# Patient Record
Sex: MALE | Race: WHITE | NOT HISPANIC OR LATINO | Employment: OTHER | URBAN - METROPOLITAN AREA
[De-identification: names, ages, dates, MRNs, and addresses within clinical notes are randomized per-mention and may not be internally consistent; named-entity substitution may affect disease eponyms.]

---

## 2017-06-27 ENCOUNTER — HOSPITAL ENCOUNTER (OUTPATIENT)
Dept: RADIOLOGY | Facility: HOSPITAL | Age: 67
Discharge: HOME | End: 2017-06-27
Attending: ORTHOPAEDIC SURGERY
Payer: MEDICARE

## 2017-06-27 ENCOUNTER — HOSPITAL ENCOUNTER (OUTPATIENT)
Dept: PREADMISSION TESTING | Facility: HOSPITAL | Age: 67
Discharge: HOME | End: 2017-06-27
Attending: ORTHOPAEDIC SURGERY
Payer: MEDICARE

## 2017-06-27 VITALS — HEIGHT: 73 IN | BODY MASS INDEX: 33.13 KG/M2 | WEIGHT: 250 LBS

## 2017-06-27 DIAGNOSIS — M19.90 OSTEOARTHRITIS, UNSPECIFIED OSTEOARTHRITIS TYPE, UNSPECIFIED SITE: Primary | ICD-10-CM

## 2017-06-27 LAB
ALBUMIN SERPL BCP-MCNC: 3.7 G/DL
ALP SERPL-CCNC: 98 U/L
ALT SERPL W/O P-5'-P-CCNC: 20 U/L
ANION GAP SERPL CALC-SCNC: 10 MMOL/L
AST SERPL-CCNC: 23 U/L
BASOPHILS # BLD AUTO: 0 K/UL
BASOPHILS NFR BLD: 0.4 %
BILIRUB SERPL-MCNC: 0.6 MG/DL
BILIRUB UR QL STRIP: NEGATIVE
BUN SERPL-MCNC: 17 MG/DL
CALCIUM SERPL-MCNC: 9.7 MG/DL
CHLORIDE SERPL-SCNC: 106 MMOL/L
CLARITY UR: CLEAR
CO2 SERPL-SCNC: 26 MMOL/L
COLOR UR: YELLOW
CREAT SERPL-MCNC: 1.1 MG/DL
DIFFERENTIAL METHOD: ABNORMAL
EOSINOPHIL # BLD AUTO: 0.1 K/UL
EOSINOPHIL NFR BLD: 2.3 %
ERYTHROCYTE [DISTWIDTH] IN BLOOD BY AUTOMATED COUNT: 13.8 %
EST. GFR  (AFRICAN AMERICAN): >60 ML/MIN/1.73 M^2
EST. GFR  (NON AFRICAN AMERICAN): >60 ML/MIN/1.73 M^2
GLUCOSE SERPL-MCNC: 110 MG/DL
GLUCOSE UR QL STRIP: NEGATIVE
HCT VFR BLD AUTO: 42.2 %
HGB BLD-MCNC: 14.5 G/DL
HGB UR QL STRIP: NEGATIVE
KETONES UR QL STRIP: NEGATIVE
LEUKOCYTE ESTERASE UR QL STRIP: NEGATIVE
LYMPHOCYTES # BLD AUTO: 1.9 K/UL
LYMPHOCYTES NFR BLD: 33.8 %
MCH RBC QN AUTO: 30.6 PG
MCHC RBC AUTO-ENTMCNC: 34.3 %
MCV RBC AUTO: 89 FL
MONOCYTES # BLD AUTO: 0.6 K/UL
MONOCYTES NFR BLD: 10.6 %
NEUTROPHILS # BLD AUTO: 3.1 K/UL
NEUTROPHILS NFR BLD: 52.9 %
NITRITE UR QL STRIP: NEGATIVE
PH UR STRIP: 6 [PH] (ref 5–8)
PLATELET # BLD AUTO: 157 K/UL
PMV BLD AUTO: 8.2 FL
POTASSIUM SERPL-SCNC: 4.2 MMOL/L
PROT SERPL-MCNC: 6.7 G/DL
PROT UR QL STRIP: NEGATIVE
RBC # BLD AUTO: 4.73 M/UL
SODIUM SERPL-SCNC: 142 MMOL/L
SP GR UR STRIP: 1.02 (ref 1–1.03)
URN SPEC COLLECT METH UR: NORMAL
UROBILINOGEN UR STRIP-ACNC: NEGATIVE EU/DL
WBC # BLD AUTO: 5.8 K/UL

## 2017-06-27 PROCEDURE — 80053 COMPREHEN METABOLIC PANEL: CPT

## 2017-06-27 PROCEDURE — 71020 XR CHEST PA AND LATERAL PRE-OP: CPT | Mod: TC

## 2017-06-27 PROCEDURE — 81003 URINALYSIS AUTO W/O SCOPE: CPT

## 2017-06-27 PROCEDURE — 93010 ELECTROCARDIOGRAM REPORT: CPT | Mod: ,,, | Performed by: INTERNAL MEDICINE

## 2017-06-27 PROCEDURE — 99900103 DSU ONLY-NO CHARGE-INITIAL HR (STAT)

## 2017-06-27 PROCEDURE — 71020 XR CHEST PA AND LATERAL PRE-OP: CPT | Mod: 26,,, | Performed by: RADIOLOGY

## 2017-06-27 PROCEDURE — 36415 COLL VENOUS BLD VENIPUNCTURE: CPT

## 2017-06-27 PROCEDURE — 99900104 DSU ONLY-NO CHARGE-EA ADD'L HR (STAT)

## 2017-06-27 PROCEDURE — 93005 ELECTROCARDIOGRAM TRACING: CPT

## 2017-06-27 PROCEDURE — 85025 COMPLETE CBC W/AUTO DIFF WBC: CPT

## 2017-06-27 PROCEDURE — 87081 CULTURE SCREEN ONLY: CPT

## 2017-06-27 RX ORDER — DOXYCYCLINE 100 MG/1
100 CAPSULE ORAL EVERY 12 HOURS
COMMUNITY
End: 2018-11-26

## 2017-06-27 RX ORDER — CLOBETASOL PROPIONATE 0.05 G/100ML
SHAMPOO TOPICAL
COMMUNITY
End: 2018-11-26

## 2017-06-28 NOTE — PRE-PROCEDURE INSTRUCTIONS
Pt states forgot  To tell  that his circulation below knees is not good, hair does not grow well and he wears support socks. Some red areas noted on lower legs. Milagros at Dr Tamayo's office notified.

## 2017-06-29 LAB — MRSA SPEC QL CULT: NORMAL

## 2017-07-07 ENCOUNTER — ANESTHESIA EVENT (OUTPATIENT)
Dept: SURGERY | Facility: HOSPITAL | Age: 67
DRG: 470 | End: 2017-07-07
Payer: MEDICARE

## 2017-07-10 ENCOUNTER — ANESTHESIA (OUTPATIENT)
Dept: SURGERY | Facility: HOSPITAL | Age: 67
DRG: 470 | End: 2017-07-10
Payer: MEDICARE

## 2017-07-10 ENCOUNTER — HOSPITAL ENCOUNTER (INPATIENT)
Facility: HOSPITAL | Age: 67
LOS: 2 days | Discharge: HOME HEALTH | DRG: 470 | End: 2017-07-12
Attending: ORTHOPAEDIC SURGERY | Admitting: ORTHOPAEDIC SURGERY
Payer: MEDICARE

## 2017-07-10 DIAGNOSIS — M19.90 OSTEOARTHRITIS, UNSPECIFIED OSTEOARTHRITIS TYPE, UNSPECIFIED SITE: Primary | ICD-10-CM

## 2017-07-10 DIAGNOSIS — Z96.652 STATUS POST TOTAL LEFT KNEE REPLACEMENT: ICD-10-CM

## 2017-07-10 DIAGNOSIS — G47.30 SLEEP APNEA, UNSPECIFIED TYPE: ICD-10-CM

## 2017-07-10 PROBLEM — E07.9 THYROID DISEASE: Status: ACTIVE | Noted: 2017-07-10

## 2017-07-10 PROCEDURE — 12000002 HC ACUTE/MED SURGE SEMI-PRIVATE ROOM

## 2017-07-10 PROCEDURE — D9220A PRA ANESTHESIA: Mod: ANES,,, | Performed by: ANESTHESIOLOGY

## 2017-07-10 PROCEDURE — 63600175 PHARM REV CODE 636 W HCPCS: Performed by: NURSE ANESTHETIST, CERTIFIED REGISTERED

## 2017-07-10 PROCEDURE — 94799 UNLISTED PULMONARY SVC/PX: CPT

## 2017-07-10 PROCEDURE — 63600175 PHARM REV CODE 636 W HCPCS: Performed by: ORTHOPAEDIC SURGERY

## 2017-07-10 PROCEDURE — 99900103 DSU ONLY-NO CHARGE-INITIAL HR (STAT): Performed by: ORTHOPAEDIC SURGERY

## 2017-07-10 PROCEDURE — 64448 NJX AA&/STRD FEM NRV NFS IMG: CPT | Mod: 59,LT,, | Performed by: ANESTHESIOLOGY

## 2017-07-10 PROCEDURE — 25000003 PHARM REV CODE 250: Performed by: ANESTHESIOLOGY

## 2017-07-10 PROCEDURE — 97530 THERAPEUTIC ACTIVITIES: CPT

## 2017-07-10 PROCEDURE — C1713 ANCHOR/SCREW BN/BN,TIS/BN: HCPCS | Performed by: ORTHOPAEDIC SURGERY

## 2017-07-10 PROCEDURE — 97162 PT EVAL MOD COMPLEX 30 MIN: CPT

## 2017-07-10 PROCEDURE — D9220A PRA ANESTHESIA: Mod: CRNA,,, | Performed by: NURSE ANESTHETIST, CERTIFIED REGISTERED

## 2017-07-10 PROCEDURE — C2626 INFUSION PUMP, NON-PROG,TEMP: HCPCS | Performed by: ANESTHESIOLOGY

## 2017-07-10 PROCEDURE — 76942 ECHO GUIDE FOR BIOPSY: CPT | Performed by: ANESTHESIOLOGY

## 2017-07-10 PROCEDURE — 25000003 PHARM REV CODE 250: Performed by: ORTHOPAEDIC SURGERY

## 2017-07-10 PROCEDURE — 25000003 PHARM REV CODE 250: Performed by: NURSE ANESTHETIST, CERTIFIED REGISTERED

## 2017-07-10 PROCEDURE — 37000009 HC ANESTHESIA EA ADD 15 MINS: Performed by: ORTHOPAEDIC SURGERY

## 2017-07-10 PROCEDURE — 71000033 HC RECOVERY, INTIAL HOUR: Performed by: ORTHOPAEDIC SURGERY

## 2017-07-10 PROCEDURE — 71000039 HC RECOVERY, EACH ADD'L HOUR: Performed by: ORTHOPAEDIC SURGERY

## 2017-07-10 PROCEDURE — 36000710: Performed by: ORTHOPAEDIC SURGERY

## 2017-07-10 PROCEDURE — 99900104 DSU ONLY-NO CHARGE-EA ADD'L HR (STAT): Performed by: ORTHOPAEDIC SURGERY

## 2017-07-10 PROCEDURE — 36000711: Performed by: ORTHOPAEDIC SURGERY

## 2017-07-10 PROCEDURE — 37000008 HC ANESTHESIA 1ST 15 MINUTES: Performed by: ORTHOPAEDIC SURGERY

## 2017-07-10 PROCEDURE — 97110 THERAPEUTIC EXERCISES: CPT

## 2017-07-10 PROCEDURE — 99222 1ST HOSP IP/OBS MODERATE 55: CPT | Mod: ,,, | Performed by: INTERNAL MEDICINE

## 2017-07-10 PROCEDURE — 0SRD0J9 REPLACEMENT OF LEFT KNEE JOINT WITH SYNTHETIC SUBSTITUTE, CEMENTED, OPEN APPROACH: ICD-10-PCS | Performed by: ORTHOPAEDIC SURGERY

## 2017-07-10 PROCEDURE — 76942 ECHO GUIDE FOR BIOPSY: CPT | Mod: 26,,, | Performed by: ANESTHESIOLOGY

## 2017-07-10 PROCEDURE — 63600175 PHARM REV CODE 636 W HCPCS

## 2017-07-10 PROCEDURE — C1776 JOINT DEVICE (IMPLANTABLE): HCPCS | Performed by: ORTHOPAEDIC SURGERY

## 2017-07-10 PROCEDURE — 27201423 OPTIME MED/SURG SUP & DEVICES STERILE SUPPLY: Performed by: ORTHOPAEDIC SURGERY

## 2017-07-10 PROCEDURE — 63600175 PHARM REV CODE 636 W HCPCS: Performed by: ANESTHESIOLOGY

## 2017-07-10 PROCEDURE — C1729 CATH, DRAINAGE: HCPCS | Performed by: ORTHOPAEDIC SURGERY

## 2017-07-10 PROCEDURE — 27200664 HC NERVE BLOCK COMPLETE KIT: Performed by: ANESTHESIOLOGY

## 2017-07-10 DEVICE — IMPLANTABLE DEVICE: Type: IMPLANTABLE DEVICE | Site: KNEE | Status: FUNCTIONAL

## 2017-07-10 RX ORDER — PROPOFOL 10 MG/ML
VIAL (ML) INTRAVENOUS
Status: DISCONTINUED | OUTPATIENT
Start: 2017-07-10 | End: 2017-07-10

## 2017-07-10 RX ORDER — PREGABALIN 75 MG/1
75 CAPSULE ORAL ONCE
Status: COMPLETED | OUTPATIENT
Start: 2017-07-10 | End: 2017-07-10

## 2017-07-10 RX ORDER — CELECOXIB 100 MG/1
200 CAPSULE ORAL ONCE
Status: COMPLETED | OUTPATIENT
Start: 2017-07-10 | End: 2017-07-10

## 2017-07-10 RX ORDER — KETOROLAC TROMETHAMINE 30 MG/ML
INJECTION, SOLUTION INTRAMUSCULAR; INTRAVENOUS
Status: DISCONTINUED | OUTPATIENT
Start: 2017-07-10 | End: 2017-07-10 | Stop reason: HOSPADM

## 2017-07-10 RX ORDER — KETAMINE HYDROCHLORIDE 100 MG/ML
INJECTION, SOLUTION INTRAMUSCULAR; INTRAVENOUS
Status: DISCONTINUED | OUTPATIENT
Start: 2017-07-10 | End: 2017-07-10

## 2017-07-10 RX ORDER — EPINEPHRINE 0.1 MG/ML
INJECTION INTRAVENOUS
Status: DISCONTINUED | OUTPATIENT
Start: 2017-07-10 | End: 2017-07-10 | Stop reason: HOSPADM

## 2017-07-10 RX ORDER — MORPHINE SULFATE 10 MG/ML
INJECTION INTRAMUSCULAR; INTRAVENOUS; SUBCUTANEOUS
Status: DISCONTINUED | OUTPATIENT
Start: 2017-07-10 | End: 2017-07-10 | Stop reason: HOSPADM

## 2017-07-10 RX ORDER — FENTANYL CITRATE 50 UG/ML
INJECTION, SOLUTION INTRAMUSCULAR; INTRAVENOUS
Status: DISCONTINUED | OUTPATIENT
Start: 2017-07-10 | End: 2017-07-10

## 2017-07-10 RX ORDER — BISACODYL 10 MG
10 SUPPOSITORY, RECTAL RECTAL DAILY
Status: DISCONTINUED | OUTPATIENT
Start: 2017-07-10 | End: 2017-07-12 | Stop reason: HOSPADM

## 2017-07-10 RX ORDER — CEFAZOLIN SODIUM 1 G/50ML
1 SOLUTION INTRAVENOUS
Status: DISCONTINUED | OUTPATIENT
Start: 2017-07-10 | End: 2017-07-10

## 2017-07-10 RX ORDER — ZOLPIDEM TARTRATE 5 MG/1
5 TABLET ORAL NIGHTLY PRN
Status: DISCONTINUED | OUTPATIENT
Start: 2017-07-10 | End: 2017-07-12 | Stop reason: HOSPADM

## 2017-07-10 RX ORDER — MORPHINE SULFATE 2 MG/ML
2 INJECTION, SOLUTION INTRAMUSCULAR; INTRAVENOUS EVERY 4 HOURS PRN
Status: DISCONTINUED | OUTPATIENT
Start: 2017-07-10 | End: 2017-07-12 | Stop reason: HOSPADM

## 2017-07-10 RX ORDER — GLYCOPYRROLATE 0.2 MG/ML
INJECTION INTRAMUSCULAR; INTRAVENOUS
Status: DISCONTINUED | OUTPATIENT
Start: 2017-07-10 | End: 2017-07-10

## 2017-07-10 RX ORDER — FAMOTIDINE 20 MG/1
20 TABLET, FILM COATED ORAL 2 TIMES DAILY
Status: DISCONTINUED | OUTPATIENT
Start: 2017-07-10 | End: 2017-07-12 | Stop reason: HOSPADM

## 2017-07-10 RX ORDER — POLYETHYLENE GLYCOL 3350 17 G/17G
17 POWDER, FOR SOLUTION ORAL DAILY
Status: DISCONTINUED | OUTPATIENT
Start: 2017-07-10 | End: 2017-07-12 | Stop reason: HOSPADM

## 2017-07-10 RX ORDER — OXYCODONE HCL 10 MG/1
10 TABLET, FILM COATED, EXTENDED RELEASE ORAL ONCE
Status: COMPLETED | OUTPATIENT
Start: 2017-07-10 | End: 2017-07-10

## 2017-07-10 RX ORDER — ACETAMINOPHEN 10 MG/ML
1000 INJECTION, SOLUTION INTRAVENOUS ONCE
Status: COMPLETED | OUTPATIENT
Start: 2017-07-10 | End: 2017-07-10

## 2017-07-10 RX ORDER — LIDOCAINE HYDROCHLORIDE 10 MG/ML
1 INJECTION, SOLUTION EPIDURAL; INFILTRATION; INTRACAUDAL; PERINEURAL ONCE
Status: COMPLETED | OUTPATIENT
Start: 2017-07-10 | End: 2017-07-10

## 2017-07-10 RX ORDER — ONDANSETRON 4 MG/1
8 TABLET, ORALLY DISINTEGRATING ORAL EVERY 8 HOURS PRN
Status: DISCONTINUED | OUTPATIENT
Start: 2017-07-10 | End: 2017-07-12 | Stop reason: HOSPADM

## 2017-07-10 RX ORDER — MIDAZOLAM HYDROCHLORIDE 1 MG/ML
INJECTION INTRAMUSCULAR; INTRAVENOUS
Status: DISCONTINUED | OUTPATIENT
Start: 2017-07-10 | End: 2017-07-10

## 2017-07-10 RX ORDER — ASPIRIN 325 MG
325 TABLET ORAL 2 TIMES DAILY
Status: DISCONTINUED | OUTPATIENT
Start: 2017-07-10 | End: 2017-07-12 | Stop reason: HOSPADM

## 2017-07-10 RX ORDER — ROPIVACAINE HYDROCHLORIDE 5 MG/ML
INJECTION, SOLUTION EPIDURAL; INFILTRATION; PERINEURAL
Status: DISCONTINUED | OUTPATIENT
Start: 2017-07-10 | End: 2017-07-10 | Stop reason: HOSPADM

## 2017-07-10 RX ORDER — CEFAZOLIN SODIUM 2 G/50ML
2 SOLUTION INTRAVENOUS ONCE
Status: COMPLETED | OUTPATIENT
Start: 2017-07-10 | End: 2017-07-10

## 2017-07-10 RX ORDER — CEFAZOLIN SODIUM 1 G/50ML
1 SOLUTION INTRAVENOUS
Status: COMPLETED | OUTPATIENT
Start: 2017-07-10 | End: 2017-07-11

## 2017-07-10 RX ORDER — DEXTROSE MONOHYDRATE AND SODIUM CHLORIDE 5; .45 G/100ML; G/100ML
INJECTION, SOLUTION INTRAVENOUS CONTINUOUS
Status: DISCONTINUED | OUTPATIENT
Start: 2017-07-10 | End: 2017-07-12 | Stop reason: HOSPADM

## 2017-07-10 RX ORDER — PROPOFOL 10 MG/ML
VIAL (ML) INTRAVENOUS CONTINUOUS PRN
Status: DISCONTINUED | OUTPATIENT
Start: 2017-07-10 | End: 2017-07-10

## 2017-07-10 RX ADMIN — CEFAZOLIN SODIUM 2 G: 2 SOLUTION INTRAVENOUS at 07:07

## 2017-07-10 RX ADMIN — KETAMINE HYDROCHLORIDE 25 MG: 100 INJECTION, SOLUTION, CONCENTRATE INTRAMUSCULAR; INTRAVENOUS at 07:07

## 2017-07-10 RX ADMIN — ROPIVACAINE HYDROCHLORIDE: 2 INJECTION, SOLUTION EPIDURAL; INFILTRATION at 09:07

## 2017-07-10 RX ADMIN — LIDOCAINE HYDROCHLORIDE 10 MG: 10 INJECTION, SOLUTION EPIDURAL; INFILTRATION; INTRACAUDAL; PERINEURAL at 06:07

## 2017-07-10 RX ADMIN — FENTANYL CITRATE 25 MCG: 50 INJECTION, SOLUTION INTRAMUSCULAR; INTRAVENOUS at 08:07

## 2017-07-10 RX ADMIN — METHOCARBAMOL 1000 MG: 100 INJECTION INTRAMUSCULAR; INTRAVENOUS at 06:07

## 2017-07-10 RX ADMIN — ASPIRIN 325 MG ORAL TABLET 325 MG: 325 PILL ORAL at 10:07

## 2017-07-10 RX ADMIN — SODIUM CHLORIDE 1000 ML: 0.9 INJECTION, SOLUTION INTRAVENOUS at 06:07

## 2017-07-10 RX ADMIN — FENTANYL CITRATE 50 MCG: 50 INJECTION, SOLUTION INTRAMUSCULAR; INTRAVENOUS at 06:07

## 2017-07-10 RX ADMIN — OXYCODONE HYDROCHLORIDE 10 MG: 10 TABLET, FILM COATED, EXTENDED RELEASE ORAL at 06:07

## 2017-07-10 RX ADMIN — PROPOFOL 10 MG: 10 INJECTION, EMULSION INTRAVENOUS at 07:07

## 2017-07-10 RX ADMIN — FENTANYL CITRATE 25 MCG: 50 INJECTION, SOLUTION INTRAMUSCULAR; INTRAVENOUS at 07:07

## 2017-07-10 RX ADMIN — SODIUM CHLORIDE, SODIUM GLUCONATE, SODIUM ACETATE, POTASSIUM CHLORIDE, MAGNESIUM CHLORIDE, SODIUM PHOSPHATE, DIBASIC, AND POTASSIUM PHOSPHATE: .53; .5; .37; .037; .03; .012; .00082 INJECTION, SOLUTION INTRAVENOUS at 08:07

## 2017-07-10 RX ADMIN — CEFAZOLIN SODIUM 1 G: 1 SOLUTION INTRAVENOUS at 03:07

## 2017-07-10 RX ADMIN — ACETAMINOPHEN 1000 MG: 10 INJECTION, SOLUTION INTRAVENOUS at 06:07

## 2017-07-10 RX ADMIN — GLYCOPYRROLATE 0.2 MG: 0.2 INJECTION, SOLUTION INTRAMUSCULAR; INTRAVENOUS at 07:07

## 2017-07-10 RX ADMIN — CELECOXIB 200 MG: 100 CAPSULE ORAL at 06:07

## 2017-07-10 RX ADMIN — PROPOFOL 100 MCG/KG/MIN: 10 INJECTION, EMULSION INTRAVENOUS at 07:07

## 2017-07-10 RX ADMIN — SODIUM CHLORIDE, SODIUM GLUCONATE, SODIUM ACETATE, POTASSIUM CHLORIDE, MAGNESIUM CHLORIDE, SODIUM PHOSPHATE, DIBASIC, AND POTASSIUM PHOSPHATE: .53; .5; .37; .037; .03; .012; .00082 INJECTION, SOLUTION INTRAVENOUS at 07:07

## 2017-07-10 RX ADMIN — FAMOTIDINE 20 MG: 20 TABLET, FILM COATED ORAL at 03:07

## 2017-07-10 RX ADMIN — CEFAZOLIN SODIUM 1 G: 1 SOLUTION INTRAVENOUS at 10:07

## 2017-07-10 RX ADMIN — PREGABALIN 75 MG: 75 CAPSULE ORAL at 06:07

## 2017-07-10 RX ADMIN — FAMOTIDINE 20 MG: 20 TABLET, FILM COATED ORAL at 10:07

## 2017-07-10 RX ADMIN — DEXTROSE AND SODIUM CHLORIDE: 5; .45 INJECTION, SOLUTION INTRAVENOUS at 10:07

## 2017-07-10 RX ADMIN — MIDAZOLAM HYDROCHLORIDE 2 MG: 1 INJECTION, SOLUTION INTRAMUSCULAR; INTRAVENOUS at 06:07

## 2017-07-10 NOTE — H&P
PCP: Wilfrido Lemon Iii, MD    History & Physical    Chief Complaint: s/p left total knee arthroplasty    History of Present Illness:  Patient is a 67 y.o. male admitted to Hospitalist Service from [facility] s/p left total knee arthroplasty performed by Dr. Tamayo. Patient reportedly has past medical history significant for OA, SIMON (on CPAP), hypothyroidism and varcosities of lower extremities. Post-operatively, patient denied chest pain, shortness of breath, abdominal pain, nausea, vomiting, headache, vision changes, focal neuro-deficits, cough or fever.    Past Medical History:   Diagnosis Date    Arthritis     Sleep apnea     USES CPAP    Thyroid disease     Varicose veins of both lower extremities     Wears glasses      Past Surgical History:   Procedure Laterality Date    BACK SURGERY      PAPAYA JUICE INJECTION    JOINT REPLACEMENT Right 10/01/2012    THYROID SURGERY      PARTIAL    UVULA      REMOVED FOR SLEEP APNEA     History reviewed. No pertinent family history.  Social History   Substance Use Topics    Smoking status: Never Smoker    Smokeless tobacco: Never Used    Alcohol use Yes      Comment: RARELY      Review of patient's allergies indicates:   Allergen Reactions    Adhesive Rash     MEDICAL GLUE CAUSED A RASH     PTA Medications   Medication Sig    clobetasol (CLOBEX) 0.05 % shampoo Apply topically twice a week.    doxycycline (VIBRAMYCIN) 100 MG Cap Take 100 mg by mouth every 12 (twelve) hours.     Review of Systems:  Constitutional: no fever or chills  Eyes: no visual changes  Ears, nose, mouth, throat, and face: no nasal congestion or sore throat  Respiratory: no cough or shorness of breath  Cardiovascular: no chest pain or palpitations  Gastrointestinal: no nausea or vomiting, no abdominal pain or change in bowel habits  Genitourinary: no hematuria or dysuria  Integument/breast: no rash or pruritis  Hematologic/lymphatic: no easy bruising or  lymphadenopathy  Musculoskeletal: see HPI  Neurological: no seizures or tremors.  Behavioral/Psych: no auditory or visual hallucinations  Endocrine: no heat or cold intolerance     OBJECTIVE:     Vital Signs (Most Recent)  Temp: 97.7 °F (36.5 °C) (07/10/17 1130)  Pulse: (!) 50 (07/10/17 1130)  Resp: 13 (07/10/17 1130)  BP: 133/85 (07/10/17 1130)  SpO2: 97 % (07/10/17 1130)    Physical Exam:  General appearance: well developed, appears stated age  Head: normocephalic, atraumatic  Eyes:  conjunctivae/corneas clear. PERRL.  Nose: Nares normal. Septum midline.  Throat: lips, mucosa, and tongue normal; teeth and gums normal, no throat erythema.  Neck: supple, symmetrical, trachea midline, no JVD and thyroid not enlarged, symmetric, no tenderness/mass/nodules  Lungs:  clear to auscultation bilaterally and normal respiratory effort  Chest wall: no tenderness  Heart: regular rate and rhythm, S1, S2 normal, no murmur, click, rub or gallop  Abdomen: soft, non-tender non-distented; bowel sounds normal; no masses,  no organomegaly  Extremities: no cyanosis, clubbing or edema. Left knee dressing C/D/I with a ELIGIO drain in place.  Pulses: 2+ and symmetric  Skin: Skin color, texture, turgor normal. No rashes or lesions.  Lymph nodes: Cervical, supraclavicular, and axillary nodes normal.  Neurologic: Normal strength and tone. No focal numbness or weakness. CNII-XII intact.      Laboratory:   CBC: No results for input(s): WBC, RBC, HGB, HCT, PLT, MCV, MCH, MCHC in the last 168 hours.  BMP: No results for input(s): GLU, NA, K, CL, CO2, BUN, CREATININE, CALCIUM, MG in the last 168 hours.    Diagnostic Results:  Left knee X-Ray: Status post left knee joint replacement.    Assessment/Plan:     Active Hospital Problems    Diagnosis  POA    *Status post total left knee replacement [Z96.652]  Continue to follow Orthopedic recommendations.  Needs aggressive incentive spirometry.  Follow hemoglobin and hematocrit closely.  Pain control with  IV narcotics and antiemetics as needed.  Physical therapy as per Orthopedics protocol with fall precautions.    Not Applicable    Osteoarthritis [M19.90]  PRN Analgesics.    Yes    Thyroid disease [E07.9]  Check TSH.    Yes    Sleep apnea [G47.30]  Yes     USES CPAP          VTE Risk Mitigation        On  mg po BID by Dr. Tamayo. Ordered     Place sequential compression device  Until discontinued      07/10/17 1140     Medium Risk of VTE  Once      07/10/17 1140        Cleo Adler MD  Department of Hospital Medicine   [facility]

## 2017-07-10 NOTE — ANESTHESIA PROCEDURE NOTES
Peripheral    Patient location during procedure: pre-op   Block not for primary anesthetic.  Reason for block: at surgeon's request and post-op pain management   Post-op Pain Location: knee left  Start time: 7/10/2017 6:42 AM  Timeout: 7/10/2017 6:42 AM   End time: 7/10/2017 6:53 AM  Surgery related to: TKA  Staffing  Anesthesiologist: ROMULO HILTON  Performed: anesthesiologist   Preanesthetic Checklist  Completed: patient identified, site marked, surgical consent, pre-op evaluation, timeout performed, IV checked, risks and benefits discussed and monitors and equipment checked  Peripheral Block  Patient position: supine  Prep: ChloraPrep and site prepped and draped  Patient monitoring: cardiac monitor, continuous pulse ox and frequent blood pressure checks  Block type: femoral  Laterality: left  Injection technique: continuous  Needle  Needle type: Tuohy   Needle gauge: 18 G  Needle length: 4 in  Needle localization: ultrasound guidance  Catheter type: non-stimulating  Catheter size: 20 G  Test dose: lidocaine 1.5% with Epi 1-to-200,000 and negative   -ultrasound image captured on disc.  Assessment  Injection assessment: negative aspiration, negative parasthesia and local visualized surrounding nerve  Paresthesia pain: none  Heart rate change: no  Slow fractionated injection: yes  Medications:  Bolus administered: 30 mL of 0.5 ropivacaine  Epinephrine added: none

## 2017-07-10 NOTE — ANESTHESIA PREPROCEDURE EVALUATION
07/10/2017  Aaron Ross is a 67 y.o., male.    Anesthesia Evaluation    I have reviewed the Patient Summary Reports.    I have reviewed the Nursing Notes.   I have reviewed the Medications.     Review of Systems  Anesthesia Hx:  Denies Family Hx of Anesthesia complications.   Denies Personal Hx of Anesthesia complications.   Social:  Non-Smoker    Pulmonary:   Sleep Apnea, CPAP    Renal/:  Renal/ Normal     Hepatic/GI:  Hepatic/GI Normal    Musculoskeletal:   Arthritis     Neurological:  Neurology Normal    Endocrine:  Endocrine Normal    Psych:  Psychiatric Normal           Physical Exam  General:  Obesity    Airway/Jaw/Neck:  Airway Findings: Mouth Opening: Normal Tongue: Normal  General Airway Assessment: Adult  Mallampati: III  Improves to II with phonation.  TM Distance: Normal, at least 6 cm  Jaw/Neck Findings:  Neck ROM: Extension Decreased, Mild  Neck Findings:  Girth Increased      Dental:  Dental Findings: upper front caps, molar caps   Chest/Lungs:  Chest/Lungs Clear    Heart/Vascular:  Heart Findings: Normal            Anesthesia Plan  Type of Anesthesia, risks & benefits discussed:  Anesthesia Type:  spinal  Patient's Preference:   Intra-op Monitoring Plan: standard ASA monitors  Intra-op Monitoring Plan Comments:   Post Op Pain Control Plan: multimodal analgesia and peripheral nerve block  Post Op Pain Control Plan Comments:   Induction:   IV  Beta Blocker:  Patient is not currently on a Beta-Blocker (No further documentation required).       Informed Consent: Patient understands risks and agrees with Anesthesia plan.  Questions answered. Anesthesia consent signed with patient.  ASA Score: 2     Day of Surgery Review of History & Physical:    H&P update referred to the surgeon.         Ready For Surgery From Anesthesia Perspective.

## 2017-07-10 NOTE — OP NOTE
[facility]  Orthopedic  Operative Note    SUMMARY     Date of Procedure: 7/10/2017     Procedure: Procedure(s) (LRB):  ARTHROPLASTY-KNEE (Left)       Surgeon(s) and Role:     * Vasu Tamayo MD - Primary    Assisting Surgeon: None    Pre-Operative Diagnosis: Osteoarthritis of left knee [M17.12]    Post-Operative Diagnosis: Post-Op Diagnosis Codes:     * Osteoarthritis of left knee [M17.12]    Anesthesia: General    Complications: Sign        Procedure in general:    The patient was placed on the operating table in supine position. He was prepped and draped in the usual sterile manner for surgery. An anterior approach was undertaken to the knee. It was carried down sharply through the skin. Medial parapatellar tissues were divided. The patella was taken laterally. The medial tibial plateau was taken down of his tissue subperiosteally. A drill was used to gain access to the femur. Intramedullary gerard was inserted up the femoral canal. The cutting jig was dependent position such that would make a 5° valgus cut and take 9 mm of distal bone. The femur was now sized. It sized to a size 6. Size 6 cutting jig was dependent position and all the femoral cuts were made. The femoral trial was placed. If it ideally.    At this point we turned our attention to the tibia. The tibia was subluxed anteriorly. Cutting jig was dependent position such that it would make a neutral cut and take 2 mm of medial bone. It was checked secondarily in the cut was made. We now placed a femoral trial and the tibial trial. We had full extension. Flexion symmetric flexion and extension gaps.    The patella was calibrated and cut and a button was placed. Construct all perfectly with no liftoff.    We pulsed and irrigated and dried the bony surfaces. We mixed her bone cement and cemented first the tibia next the femur and finally very patella. All excess cement was removed at the time of cementation in the construct was held in full  extension until the cement had completely hardened. All excess cement was removed at the time of cementation. We pulsed and irrigated. The actual space was tapped into position. A drain was brought out laterally. We closed the extensor mechanism with a combination of #2 FiberWire in a running stitch. We irrigated again. We closed the subcutaneous Vicryl and the skin with PDS. Sterile dressings were applied and the patient was taken to recovery room in stable condition pending an x-ray and the neurovascular check.    Estimated Blood Loss (EBL): * No values recorded between 7/10/2017  7:58 AM and 7/10/2017  8:52 AM *           Implants:   Implant Name Type Inv. Item Serial No.  Lot No. LRB No. Used   Radiopaque Bone Cement     [employer] [id number] Left 2   Evolution MP Keeled Tibial Base     [employer] [id number] Left 1   [employer] MP CS/CR Femoral Component     [employer] [id number] Left 1   Advance Onlay All-poly Patella        [employer] [id number] Left 1       Specimens:   Specimen (12h ago through future)    None                  Condition: Stable    Disposition: PACU - hemodynamically stable.    Attestation: I was present and scrubbed for the entire procedure.

## 2017-07-10 NOTE — TRANSFER OF CARE
Anesthesia Transfer of Care Note    Patient: Aaron Ross    Procedure(s) Performed: Procedure(s) (LRB):  ARTHROPLASTY-KNEE (Left)    Patient location: PACU    Anesthesia Type: spinal    Transport from OR: Transported from OR on 2-3 L/min O2 by NC with adequate spontaneous ventilation    Post pain: adequate analgesia    Post assessment: no apparent anesthetic complications    Post vital signs: stable    Level of consciousness: awake and alert    Nausea/Vomiting: no nausea/vomiting    Complications: none    Transfer of care protocol was followed      Last vitals:   Visit Vitals  BP (!) 159/73 (BP Location: Left arm, BP Method: Automatic)   Pulse 62   Temp 36.6 °C (97.9 °F) (Temporal)   Resp 16   SpO2 97%

## 2017-07-10 NOTE — PT/OT/SLP EVAL
Physical Therapy  Evaluation    Aaron Ross   MRN: [id number]   Admitting Diagnosis: Status post total left knee replacement    PT Received On: 07/10/17  PT Start Time: 1318     PT Stop Time: 1349    PT Total Time (min): 31 min       Billable Minutes:  Evaluation 10, Therapeutic Activity 10, Therapeutic Exercise 11    Diagnosis: Status post total left knee replacement (7/10/17)      Past Medical History:   Diagnosis Date    Arthritis     Sleep apnea     USES CPAP    Thyroid disease     Varicose veins of both lower extremities     Wears glasses       Past Surgical History:   Procedure Laterality Date    BACK SURGERY      PAPAYA JUICE INJECTION    JOINT REPLACEMENT Right 10/01/2012    THYROID SURGERY      PARTIAL    UVULA      REMOVED FOR SLEEP APNEA       Referring physician: Finger  Date referred to PT: 7/10/17    General Precautions: Standard, fall  Orthopedic Precautions: LLE weight bearing as tolerated   Braces: N/A            Patient History:  Lives With: spouse  Living Arrangements: house  Equipment Currently Used at Home: cane, straight  DME owned (not currently used): none    Previous Level of Function:  Ambulation Skills: needs device  Transfer Skills: needs device  ADL Skills: needs device  Work/Leisure Activity: needs device    Subjective:  Communicated with nurse ([place]) prior to session.    Chief Complaint: lack of functional mobility  Patient goals: to go home    Pain/Comfort  Pain Rating 1: 0/10  Pain Rating Post-Intervention 1: 0/10      Objective:   Patient found with: PCA, oxygen, SCD, telemetry, peripheral IV (RUSSELL drain)     Cognitive Exam:  Oriented to: Person, Place, Time and Situation    Follows Commands/attention: Easily distracted and Follows one-step commands  Communication: clear/fluent  Safety awareness/insight to disability: intact    Physical Exam:  Postural examination/scapula alignment: Rounded shoulder and Head forward    Skin integrity: Visible skin intact  Edema: None  noted     Sensation:   Impaired  light/touch LLE thigh    Upper Extremity Range of Motion:  Right Upper Extremity: WFL  Left Upper Extremity: WFL    Upper Extremity Strength:  Right Upper Extremity: WFL  Left Upper Extremity: WFL    Lower Extremity Range of Motion:  Right Lower Extremity: WFL  Left Lower Extremity: knee flexion ROM  degrees    Lower Extremity Strength:  Right Lower Extremity: >4-/5  Left Lower Extremity: >3-/5    Functional Mobility:  Bed Mobility:  Supine to Sit: Minimum Assistance  Sit to Supine: Minimum Assistance    Transfers:  Sit <> Stand Assistance: Moderate Assistance  Sit <> Stand Assistive Device: Rolling Walker    Gait:   Gait Distance: 4 steps with safety belt in place  Assistance 1: Moderate assistance  Gait Assistive Device: Rolling walker  Gait Pattern: 3-point gait  Gait Deviation(s): decreased kaleb, increased time in double stance, decreased step length (L knee buckling)      Balance:   Static Sit: FAIR-: Maintains without assist but inconsistent   Dynamic Sit: FAIR+: Maintains balance through MINIMAL excursions of active trunk motion  Static Stand: POOR: Needs MODERATE assist to maintain  Dynamic stand: POOR: N/A    Therapeutic Activities and Exercises:  Pt gait trained with side steps to the R at EOB with safety belt in place, mod A, and RW.   Supine exercise: quad sets x 10, ankle pumps x 10, glute sets x 10    AM-PAC 6 CLICK MOBILITY  How much help from another person does this patient currently need?   1 = Unable, Total/Dependent Assistance  2 = A lot, Maximum/Moderate Assistance  3 = A little, Minimum/Contact Guard/Supervision  4 = None, Modified [place]/Independent          AM-PAC Raw Score CMS G-Code Modifier Level of Impairment Assistance   6 % Total / Unable   7 - 9 CM 80 - 100% Maximal Assist   10 - 14 CL 60 - 80% Moderate Assist   15 - 19 CK 40 - 60% Moderate Assist   20 - 22 CJ 20 - 40% Minimal Assist   23 CI 1-20% SBA / CGA   24 CH 0% Independent/  Mod I     Patient left HOB elevated with all lines intact, call button in reach and wife present.    Assessment:   Aaron Ross is a 67 y.o. male with a medical diagnosis of Status post total left knee replacement and presents with LLE weakness, gait instability, and decreased L knee ROM. Prior to admit pt was independent living at home using SPC. Pt lives with wife and she is able to help take care of him. Skilled therapy is medically necessary to address these impairments in order to return the pt back to his previous level of functioning.     Rehab identified problem list/impairments: Rehab identified problem list/impairments: weakness, impaired endurance, impaired sensation, gait instability, decreased lower extremity function, impaired functional mobilty, impaired balance    Rehab potential is good.    Activity tolerance: Good    Discharge recommendations: Discharge Facility/Level Of Care Needs: home health PT     Barriers to discharge: Barriers to Discharge: None    Equipment recommendations: Equipment Needed After Discharge: walker, rolling     GOALS:    Physical Therapy Goals        Problem: Physical Therapy Goal    Goal Priority Disciplines Outcome Goal Variances Interventions   Physical Therapy Goal    High PT/OT, PT      Description:  Goals to be met by: 17    Patient will increase functional independence with mobility by performin. Supine to sit with MInimal Assistance  2. Sit to supine with MInimal Assistance  3. Sit to stand transfer with Minimal Assistance  4. Gait  x >150 feet with Minimal Assistance using Rolling Walker.   5. Lower extremity exercise program x10 reps per handout, with supervision                      PLAN:    Patient to be seen BID to address the above listed problems via gait training, therapeutic activities, therapeutic exercises  Plan of Care expires: 17  Plan of Care reviewed with: spouse, patient          Alexsandra Monique, PT  07/10/2017

## 2017-07-10 NOTE — ANESTHESIA POSTPROCEDURE EVALUATION
"Anesthesia Post Evaluation    Patient: Aarno Ross    Procedure(s) Performed: Procedure(s) (LRB):  ARTHROPLASTY-KNEE (Left)    Final Anesthesia Type: spinal  Patient location during evaluation: PACU  Patient participation: Yes- Able to Participate  Level of consciousness: awake and alert  Post-procedure vital signs: reviewed and stable  Pain management: adequate  Airway patency: patent  PONV status at discharge: No PONV  Anesthetic complications: no      Cardiovascular status: blood pressure returned to baseline  Respiratory status: unassisted  Hydration status: euvolemic  Follow-up not needed.        Visit Vitals  /85   Pulse 62   Temp 36.4 °C (97.5 °F) (Oral)   Resp 18   Ht 6' 0.5" (1.842 m)   Wt 113.4 kg (250 lb)   SpO2 99%   BMI 33.44 kg/m²       Pain/Alexander Score: Pain Assessment Performed: Yes (7/10/2017  4:00 PM)  Presence of Pain: denies (7/10/2017  4:00 PM)  Pain Rating Prior to Med Admin: 0 (7/10/2017  2:00 PM)  Alexander Score: 10 (7/10/2017 11:30 AM)      "

## 2017-07-10 NOTE — ANESTHESIA PROCEDURE NOTES
Spinal    Diagnosis: DJD knee left  Patient location during procedure: OR  Start time: 7/10/2017 7:14 AM  Timeout: 7/10/2017 7:14 AM  End time: 7/10/2017 7:17 AM  Staffing  Anesthesiologist: ROMULO HILTON  Performed: anesthesiologist   Preanesthetic Checklist  Completed: patient identified, site marked, surgical consent, pre-op evaluation, timeout performed, IV checked, risks and benefits discussed and monitors and equipment checked  Spinal Block  Patient position: sitting  Prep: ChloraPrep  Patient monitoring: heart rate and cardiac monitor  Approach: right paramedian  Location: L3-4  Injection technique: single shot  CSF Fluid: clear free-flowing CSF  Needle  Needle type: Aditya   Needle gauge: 25 G  Needle length: 3.5 in  Additional Documentation: incremental injection, negative aspiration for heme and no paresthesia on injection  Needle localization: anatomical landmarks  Assessment  Sensory level: T9   Dermatomal levels determined by alcohol wipe  Ease of block: easy  Patient's tolerance of the procedure: comfortable throughout block and no complaints  Medications:  Bolus administered: 3 mL of 0.5 bupivacaine  Epinephrine added: none

## 2017-07-10 NOTE — BRIEF OP NOTE
[facility]  Brief Operative Note    SUMMARY     Surgery Date: 7/10/2017     Surgeon(s) and Role:     * Vasu Tamayo MD - Primary    Assisting Surgeon: None    Pre-op Diagnosis:  Osteoarthritis of left knee [M17.12]    Post-op Diagnosis:  Post-Op Diagnosis Codes:     * Osteoarthritis of left knee [M17.12]    Procedure(s) (LRB):  ARTHROPLASTY-KNEE (Left)    Anesthesia: General    Description of Procedure: L TKA    Description of the findings of the procedure: same    Estimated Blood Loss:0       Specimens:   Specimen (12h ago through future)    None

## 2017-07-10 NOTE — PLAN OF CARE
Problem: Patient Care Overview  Goal: Plan of Care Review  Pt remains free from injury.  S/p left TKA for Dr Tamayo.  RUSSELL in use and autotransfused 405cc.  Addison to gravity and to be removed POD 1.  Pain controled at this time with Qball.  Frequent checks completed.  Neuro checks WNL.  Up with PT briefly today.  POC discussed.  IS at bedside, with good effort.  Verbalized understanding.

## 2017-07-11 LAB
ANION GAP SERPL CALC-SCNC: 7 MMOL/L
BASOPHILS # BLD AUTO: 0 K/UL
BASOPHILS NFR BLD: 0.2 %
BUN SERPL-MCNC: 12 MG/DL
CALCIUM SERPL-MCNC: 8.8 MG/DL
CHLORIDE SERPL-SCNC: 105 MMOL/L
CO2 SERPL-SCNC: 26 MMOL/L
CREAT SERPL-MCNC: 1 MG/DL
DIFFERENTIAL METHOD: ABNORMAL
EOSINOPHIL # BLD AUTO: 0.1 K/UL
EOSINOPHIL NFR BLD: 1.4 %
ERYTHROCYTE [DISTWIDTH] IN BLOOD BY AUTOMATED COUNT: 14 %
EST. GFR  (AFRICAN AMERICAN): >60 ML/MIN/1.73 M^2
EST. GFR  (NON AFRICAN AMERICAN): >60 ML/MIN/1.73 M^2
GLUCOSE SERPL-MCNC: 112 MG/DL
HCT VFR BLD AUTO: 37.5 %
HGB BLD-MCNC: 13 G/DL
LYMPHOCYTES # BLD AUTO: 1.3 K/UL
LYMPHOCYTES NFR BLD: 13 %
MCH RBC QN AUTO: 31.1 PG
MCHC RBC AUTO-ENTMCNC: 34.6 %
MCV RBC AUTO: 90 FL
MONOCYTES # BLD AUTO: 1.1 K/UL
MONOCYTES NFR BLD: 11.8 %
NEUTROPHILS # BLD AUTO: 7.1 K/UL
NEUTROPHILS NFR BLD: 73.6 %
PLATELET # BLD AUTO: 133 K/UL
PMV BLD AUTO: 8.3 FL
POTASSIUM SERPL-SCNC: 4.1 MMOL/L
RBC # BLD AUTO: 4.17 M/UL
SODIUM SERPL-SCNC: 138 MMOL/L
WBC # BLD AUTO: 9.7 K/UL

## 2017-07-11 PROCEDURE — G8988 SELF CARE GOAL STATUS: HCPCS | Mod: CJ

## 2017-07-11 PROCEDURE — 25000003 PHARM REV CODE 250: Performed by: INTERNAL MEDICINE

## 2017-07-11 PROCEDURE — 94799 UNLISTED PULMONARY SVC/PX: CPT

## 2017-07-11 PROCEDURE — 85025 COMPLETE CBC W/AUTO DIFF WBC: CPT

## 2017-07-11 PROCEDURE — 36415 COLL VENOUS BLD VENIPUNCTURE: CPT

## 2017-07-11 PROCEDURE — 99900035 HC TECH TIME PER 15 MIN (STAT)

## 2017-07-11 PROCEDURE — 99232 SBSQ HOSP IP/OBS MODERATE 35: CPT | Mod: ,,, | Performed by: INTERNAL MEDICINE

## 2017-07-11 PROCEDURE — 97535 SELF CARE MNGMENT TRAINING: CPT

## 2017-07-11 PROCEDURE — 97530 THERAPEUTIC ACTIVITIES: CPT

## 2017-07-11 PROCEDURE — 97165 OT EVAL LOW COMPLEX 30 MIN: CPT

## 2017-07-11 PROCEDURE — 25000003 PHARM REV CODE 250: Performed by: ORTHOPAEDIC SURGERY

## 2017-07-11 PROCEDURE — 97116 GAIT TRAINING THERAPY: CPT

## 2017-07-11 PROCEDURE — 12000002 HC ACUTE/MED SURGE SEMI-PRIVATE ROOM

## 2017-07-11 PROCEDURE — 80048 BASIC METABOLIC PNL TOTAL CA: CPT

## 2017-07-11 PROCEDURE — G8987 SELF CARE CURRENT STATUS: HCPCS | Mod: CK

## 2017-07-11 PROCEDURE — 63600175 PHARM REV CODE 636 W HCPCS: Performed by: ORTHOPAEDIC SURGERY

## 2017-07-11 PROCEDURE — 94761 N-INVAS EAR/PLS OXIMETRY MLT: CPT

## 2017-07-11 RX ORDER — HYDROCODONE BITARTRATE AND ACETAMINOPHEN 10; 325 MG/1; MG/1
1 TABLET ORAL EVERY 6 HOURS PRN
Status: DISCONTINUED | OUTPATIENT
Start: 2017-07-11 | End: 2017-07-12 | Stop reason: HOSPADM

## 2017-07-11 RX ADMIN — CEFAZOLIN SODIUM 1 G: 1 SOLUTION INTRAVENOUS at 06:07

## 2017-07-11 RX ADMIN — MORPHINE SULFATE 2 MG: 2 INJECTION, SOLUTION INTRAMUSCULAR; INTRAVENOUS at 11:07

## 2017-07-11 RX ADMIN — DEXTROSE AND SODIUM CHLORIDE: 5; .45 INJECTION, SOLUTION INTRAVENOUS at 05:07

## 2017-07-11 RX ADMIN — FAMOTIDINE 20 MG: 20 TABLET, FILM COATED ORAL at 10:07

## 2017-07-11 RX ADMIN — HYDROCODONE BITARTRATE AND ACETAMINOPHEN 1 TABLET: 10; 325 TABLET ORAL at 09:07

## 2017-07-11 RX ADMIN — ONDANSETRON 8 MG: 4 TABLET, ORALLY DISINTEGRATING ORAL at 07:07

## 2017-07-11 RX ADMIN — HYDROCODONE BITARTRATE AND ACETAMINOPHEN 1 TABLET: 10; 325 TABLET ORAL at 02:07

## 2017-07-11 RX ADMIN — ASPIRIN 325 MG ORAL TABLET 325 MG: 325 PILL ORAL at 10:07

## 2017-07-11 RX ADMIN — FAMOTIDINE 20 MG: 20 TABLET, FILM COATED ORAL at 09:07

## 2017-07-11 RX ADMIN — ONDANSETRON 8 MG: 4 TABLET, ORALLY DISINTEGRATING ORAL at 02:07

## 2017-07-11 RX ADMIN — ASPIRIN 325 MG ORAL TABLET 325 MG: 325 PILL ORAL at 09:07

## 2017-07-11 NOTE — PLAN OF CARE
Problem: Physical Therapy Goal  Goal: Physical Therapy Goal  Goals to be met by: 17    Patient will increase functional independence with mobility by performin. Supine to sit with MInimal Assistance  2. Sit to supine with MInimal Assistance  3. Sit to stand transfer with Minimal Assistance  4. Gait  x >150 feet with Minimal Assistance using Rolling Walker.   5. Lower extremity exercise program x10 reps per handout, with supervision     Outcome: Ongoing (interventions implemented as appropriate)  PT for bed mobility, transfer training, and gait BID.

## 2017-07-11 NOTE — PLAN OF CARE
Problem: Patient Care Overview  Goal: Plan of Care Review  Pt vital signs stabel at this time. Rt hand IV infusing D5 1/2 NS @ 75 cc/hr and antibiotics as ordered without any difficulty. Pt s/p Lt TKA per Dr Tamayo on 7/10/17, surgical dsg, Q ball  and cryotherapy in place. Pt denies pain so far this shift. Bipap @ HS. RUSSELL drain in place to Lt knee. Addison intact draining yellow urine. Call light within pt reach, pt instructed to call staff for any needed assistance, Q2hr frequent checks performed, safety maintained. Pt denies needs/concerns at this time, will continue to monitor.

## 2017-07-11 NOTE — PT/OT/SLP PROGRESS
Physical Therapy  Treatment    Aaron Ross   MRN: [id number]   Admitting Diagnosis: Status post total left knee replacement    PT Received On: 07/11/17  PT Start Time: 1120     PT Stop Time: 1141    PT Total Time (min): 21 min       Billable Minutes:  Gait [id number] and Therapeutic Activity 10    Treatment Type: Treatment  PT/PTA: PTA     PTA Visit Number: 1       General Precautions: Standard, fall  Orthopedic Precautions: LLE weight bearing as tolerated   Braces: N/A         Subjective:  Communicated with nurse Merritt prior to session.  Pt agreeable to therapy.     Pain/Comfort  Pain Rating 1: 2/10    Objective:   Patient found with: perineural catheter, peripheral IV, Polar ice, SCD, telemetry ([facility])    Functional Mobility:  Bed Mobility:   Supine to Sit: Minimum Assistance A with L LE    Transfers:  Sit <> Stand Assistance: Minimum Assistance (pt impulsive, trying to stand on own)  Sit <> Stand Assistive Device: Rolling Walker   Bed to chair: Min A with RW    Gait:   Gait Distance: 150' with mod A 2' L LE buckling episodes  Assistance 1: Moderate assistance  Gait Assistive Device: Rolling walker  Gait Pattern: 3-point gait  Gait Deviation(s): decreased kaleb, increased time in double stance, decreased step length, decreased velocity of limb motion, decreased stride length, decreased toe-to-floor clearance, excessive knee flexion, decreased weight-shifting ability (L knee buckling)        Balance:   Static Sit: NORMAL: No deviations seen in posture held statically  Dynamic Sit: NORMAL: No deviations seen in posture held dynamically  Static Stand: FAIR: Maintains without assist but unable to take challenges  Dynamic stand: POOR: N/A     Therapeutic Activities and Exercises:  Pt edu on safety and waiting for assistance to stand or ambulate to prevent fall.          Patient left up in chair with all lines intact, call button in reach and nurse Merritt notified.    Assessment:  Aaron Ross is a 67  y.o. male with a medical diagnosis of Status post total left knee replacement and presents with overall improved activity tolerance and gait distance today. Pt with decreased safety awareness and L knee buckling with gait, at risk for fall without assistance present.     Rehab identified problem list/impairments: Rehab identified problem list/impairments: weakness, impaired endurance, impaired sensation, impaired functional mobilty, gait instability, impaired balance, decreased safety awareness, pain, decreased lower extremity function, decreased ROM, orthopedic precautions    Rehab potential is good.    Activity tolerance: Good      GOALS:    Physical Therapy Goals        Problem: Physical Therapy Goal    Goal Priority Disciplines Outcome Goal Variances Interventions   Physical Therapy Goal    High PT/OT, PT      Description:  Goals to be met by: 17    Patient will increase functional independence with mobility by performin. Supine to sit with MInimal Assistance  2. Sit to supine with MInimal Assistance  3. Sit to stand transfer with Minimal Assistance  4. Gait  x >150 feet with Minimal Assistance using Rolling Walker.   5. Lower extremity exercise program x10 reps per handout, with supervision                      PLAN:    Patient to be seen BID  to address the above listed problems via gait training, therapeutic activities, therapeutic exercises  Plan of Care expires: 17  Plan of Care reviewed with: patient         Arcelia Bettencourt, PTA  2017

## 2017-07-11 NOTE — PT/OT/SLP EVAL
Occupational Therapy  Evaluation    Aaron Ross   MRN: [id number]   Admitting Diagnosis: Status post total left knee replacement    OT Date of Treatment: 07/11/17   OT Start Time: 1420  OT Stop Time: 1440  OT Total Time (min): 20 min    Billable Minutes:  Evaluation 8  Self Care/Home Management 12    Diagnosis: Status post total left knee replacement       Past Medical History:   Diagnosis Date    Arthritis     Sleep apnea     USES CPAP    Thyroid disease     Varicose veins of both lower extremities     Wears glasses       Past Surgical History:   Procedure Laterality Date    BACK SURGERY      PAPAYA JUICE INJECTION    JOINT REPLACEMENT Right 10/01/2012    THYROID SURGERY      PARTIAL    UVULA      REMOVED FOR SLEEP APNEA       Referring physician: Finger  Date referred to OT: 7/10/17    General Precautions: Standard, fall  Orthopedic Precautions: LLE weight bearing as tolerated  Braces: N/A          Patient History:  Living Environment  Lives With: spouse  Living Arrangements: house  Home Layout: Able to live on 1st floor  Transportation Available: car, family or friend will provide  Living Environment Comment: Pt lives with wife in a 2 story house with 1 [address]. He does not have to go upstairs and has a walk-in shower. Wife is able to help as needed.  Equipment Currently Used at Home: none    Prior level of function:   Bed Mobility/Transfers: independent  Grooming: independent  Bathing: independent  Upper Body Dressing: independent  Lower Body Dressing: independent  Toileting: independent  Home Management Skills: independent  Driving License: Yes  Mode of Transportation: Car, Family  Occupation: Retired  IADL Comments: Pt was (I)/Mod I with ADL/IADLs with pain and at a slow pace.     Dominant hand: right    Subjective:  Communicated with nurse Shaina prior to session.  Stated patient was cleared for OT today.  Chief Complaint: L knee pain  Patient/Family stated goals: to walk without  pain    Pain/Comfort  Pain Rating 1: 8/10  Location - Side 1: Left  Location - Orientation 1: generalized  Location 1: knee  Pain Addressed 1: Pre-medicate for activity (Nurse gave pt pain medicine at start of OT evaluation.)  Pain Rating Post-Intervention 1: 8/10    Objective:  Patient found with: perineural catheter, peripheral IV, SCD, FCD, telemetry, Polar ice (RUSSELL)    Cognitive Exam:  Oriented to: Person, Place, Time and Situation  Follows Commands/attention: Follows multistep  commands  Communication: clear/fluent  Memory:  No Deficits noted  Safety awareness/insight to disability:needs further assessment  Coping skills/emotional control: Appropriate to situation    Visual/perceptual:  Intact    Physical Exam:  Postural examination/scapula alignment: Rounded shoulder  Skin integrity: Visible skin intact  Edema: Mild L ankle & foot    Sensation:   Intact    Upper Extremity Range of Motion:  Right Upper Extremity: WNL  Left Upper Extremity: WNL    Upper Extremity Strength:  Right Upper Extremity: WNL  Left Upper Extremity: WNL   Strength: WNL    Fine motor coordination:   Intact    Functional Mobility:  Bed Mobility:Pt refused all bed mobility due to knee pain and just returning to bed after PT treatment.      Activities of Daily Living:  Feeding Level of Assistance: Independent       LE adaptive equipment: Reacher and Sock aid   Grooming Position: other (set-up only with HOB raised)      Bathing adaptive equipment: long-handled sponge    Therapeutic Activities and Exercises:  OT ed pt on OT role & POC as well as discharge recommendations.  OT ed pt & wife on use of adaptive equipment for LB dressing & safe item retrieval with reacher with demonstration provided.  Despite encouragement, pt would not sit at EOB to practice with AB adaptive equipment.    AM-PAC 6 CLICK ADL  How much help from another person does this patient currently need?  1 = Unable, Total/Dependent Assistance  2 = A lot, Maximum/Moderate  "Assistance  3 = A little, Minimum/Contact Guard/Supervision  4 = None, Modified [place]/Independent    Putting on and taking off regular lower body clothing? : 2  Bathing (including washing, rinsing, drying)?: 2  Toileting, which includes using toilet, bedpan, or urinal? : 2  Putting on and taking off regular upper body clothing?: 3  Taking care of personal grooming such as brushing teeth?: 3  Eating meals?: 4  Total Score: 16    AM-PAC Raw Score CMS "G-Code Modifier Level of Impairment Assistance   6 % Total / Unable   7 - 9 CM 80 - 100% Maximal Assist   10-14 CL 60 - 80% Moderate Assist   15 - 19 CK 40 - 60% Moderate Assist   20 - 22 CJ 20 - 40% Minimal Assist   23 CI 1-20% SBA / CGA   24 CH 0% Independent/ Mod I       Patient left supine with all lines intact, call button in reach, Shaina, nurse notified and family present    Assessment:  Aaron Ross is a 67 y.o. male with a medical diagnosis of Status post total left knee replacement and presents with decline in functional status, impacting ADLs and functional mobility. Evaluation was limited due to pt's refusal to sit at EOB or get OOB. Recommend OT treatment to maximize safety & independence with ADLs.    Rehab identified problem list/impairments: Rehab identified problem list/impairments: weakness, impaired self care skills, impaired endurance, impaired functional mobilty, gait instability, decreased lower extremity function, orthopedic precautions, decreased ROM, pain, edema    Rehab potential is good.    Activity tolerance: Fair    Discharge recommendations: Discharge Facility/Level Of Care Needs: home health PT     Barriers to discharge: Barriers to Discharge: None    Equipment recommendations: walker, rolling, 3-in-1 commode     GOALS:    Occupational Therapy Goals        Problem: Occupational Therapy Goal    Goal Priority Disciplines Outcome Interventions   Occupational Therapy Goal     OT, PT/OT Ongoing (interventions implemented " as appropriate)    Description:  Goals to be met by: 7/18/17     Patient will increase functional independence with ADLs by performing:    Don/doff socks and pull pants over feet with Set-up Assistance, Standby Assistance and Assistive Devices as needed.  Toileting from bedside commode with Set-up Assistance, Contact Guard Assistance and Assistive Devices as needed for hygiene and clothing management.   Supine to sit with Contact Guard Assistance and use of bedrail as needed.  Toilet transfer to bedside commode with Contact Guard Assistance.                      PLAN:  Patient to be seen 2 x/week to address the above listed problems via self-care/home management, therapeutic activities, therapeutic exercises  Plan of Care expires: 07/18/17  Plan of Care reviewed with: patient, spouse, family    OT G-codes  Functional Assessment Tool Used: [facility]  Score: 16  Functional Limitation: Self care  Self Care Current Status (): CK  Self Care Goal Status (): TRISHA Marcum  07/11/2017

## 2017-07-11 NOTE — PLAN OF CARE
Problem: Occupational Therapy Goal  Goal: Occupational Therapy Goal  Goals to be met by: 7/18/17     Patient will increase functional independence with ADLs by performing:    Don/doff socks and pull pants over feet with Set-up Assistance, Standby Assistance and Assistive Devices as needed.  Toileting from bedside commode with Set-up Assistance, Contact Guard Assistance and Assistive Devices as needed for hygiene and clothing management.   Supine to sit with Contact Guard Assistance and use of bedrail as needed.  Toilet transfer to bedside commode with Contact Guard Assistance.    Outcome: Ongoing (interventions implemented as appropriate)  OT evaluation completed today. Goals & care plan established.    TRISHA Rodriguez  7/11/2017

## 2017-07-11 NOTE — SUBJECTIVE & OBJECTIVE
"Principal Problem:Status post total left knee replacement    Principal Orthopedic Problem: L knee DJD    Interval History: none    Review of patient's allergies indicates:   Allergen Reactions    Adhesive Rash     MEDICAL GLUE CAUSED A RASH       Current Facility-Administered Medications   Medication    aspirin tablet 325 mg    bisacodyl suppository 10 mg    ceFAZolin (ANCEF) 1 gram in dextrose 5 % 50 mL IVPB (premix)    dextrose 5 % and 0.45 % NaCl infusion    famotidine tablet 20 mg    morphine injection 2 mg    ondansetron disintegrating tablet 8 mg    polyethylene glycol packet 17 g    zolpidem tablet 5 mg     Objective:     Vital Signs (Most Recent):  Temp: 99.2 °F (37.3 °C) (07/11/17 0500)  Pulse: 72 (07/11/17 0500)  Resp: 17 (07/11/17 0500)  BP: (!) 140/64 (07/11/17 0500)  SpO2: 98 % (07/11/17 0500) Vital Signs (24h Range):  Temp:  [97.5 °F (36.4 °C)-99.2 °F (37.3 °C)] 99.2 °F (37.3 °C)  Pulse:  [48-80] 72  Resp:  [12-29] 17  SpO2:  [95 %-100 %] 98 %  BP: (113-160)/(61-85) 140/64     Weight: 113.4 kg (250 lb)  Height: 6' 0.5" (184.2 cm)  Body mass index is 33.44 kg/m².      Intake/Output Summary (Last 24 hours) at 07/11/17 0702  Last data filed at 07/11/17 0500   Gross per 24 hour   Intake          3951.25 ml   Output             4905 ml   Net          -953.75 ml       General    Vitals reviewed.  Constitutional: He appears well-developed and well-nourished.   Pulmonary/Chest: Effort normal.   Abdominal: Soft. Bowel sounds are normal.   Neurological: He is alert.   Psychiatric: He has a normal mood and affect. His behavior is normal. Judgment and thought content normal.             Left Knee Exam     Comments:  LLE DNVI. Dressings clean and dry. RUSSELL in place. Addison being removed now.      Significant Labs:   CBC:   Recent Labs  Lab 07/11/17  0522   WBC 9.70   HGB 13.0*   HCT 37.5*   *     CMP:   Recent Labs  Lab 07/11/17  0522      K 4.1      CO2 26   *   BUN 12 "   CREATININE 1.0   CALCIUM 8.8   ANIONGAP 7*   EGFRNONAA >60     All pertinent labs within the past 24 hours have been reviewed.    Significant Imaging: None

## 2017-07-11 NOTE — PLAN OF CARE
07/11/17 0830   Patient Assessment/Suction   Level of Consciousness (AVPU) alert   Respiratory Effort Normal;Unlabored   Expansion/Accessory Muscles/Retractions no retractions;no use of accessory muscles   All Lung Fields Breath Sounds clear;equal bilaterally   Cough Frequency infrequent   Cough Type good;nonproductive   PRE-TX-O2-ETCO2   O2 Device (Oxygen Therapy) room air   SpO2 99 %   Pulse Oximetry Type Intermittent   $ Pulse Oximetry - Multiple Charge Pulse Oximetry - Multiple   Pulse 76   Resp 18   Incentive Spirometer   $ Incentive Spirometer Charges done with encouragement   Predicted Level (mL) (Incentive Spirometer) 2000   Administration (Incentive Spirometer) done with encouragement   Number of Repetitions (Incentive Spirometer) 10   Level (mL) (Incentive Spirometer) 3000   Patient Tolerance good       Patient encouraged to do deep breathing exercises independently.

## 2017-07-11 NOTE — PLAN OF CARE
07/10/17 2118   Incentive Spirometer   $ Incentive Spirometer Charges done with encouragement   Predicted Level (mL) (Incentive Spirometer) 2000   Administration (Incentive Spirometer) done with encouragement   Number of Repetitions (Incentive Spirometer) 10   Level (mL) (Incentive Spirometer) 3000   Patient Tolerance good

## 2017-07-11 NOTE — PLAN OF CARE
The pt lives at home with his spouse, Nelda [phone]. He does not have HH or DME. However upon dc he would like [facility]. He will also need a WALKER but does NOT want a BSC. He uses [facility]. He sees Dr. Lemon and has Medicare and [employer]. No questions or concerns at this time and I wrote my name and number on the pts white board. Janet Ackerman LMSW      07/11/17 1058   Discharge Assessment   Assessment Type Discharge Planning Assessment   Confirmed/corrected address and phone number on facesheet? Yes   Assessment information obtained from? Patient   Communicated expected length of stay with patient/caregiver yes   Type of Healthcare Directive Received (Pts wife - Nelda [phone])   If Healthcare Directive is received, is it scanned into Epic? no (comment)   Prior to hospitilization cognitive status: Alert/Oriented   Prior to hospitalization functional status: Independent   Current cognitive status: Alert/Oriented   Current Functional Status: Independent   Lives With spouse   Able to Return to Prior Arrangements yes   Is patient able to care for self after discharge? Yes   How many people do you have in your home that can help with your care after discharge? 1   Readmission Within The Last 30 Days no previous admission in last 30 days   Patient currently being followed by outpatient case management? No   Patient currently receives home health services? No   Does the patient currently use HME? No   Patient currently receives private duty nursing? No   Patient currently receives any other outside agency services? No   Equipment Currently Used at Home none   Do you have any problems affording any of your prescribed medications? No  ([facility] )   Is the patient taking medications as prescribed? yes   Do you have any financial concerns preventing you from receiving the healthcare you need? No  (Medicare and [employer] )   Does the patient have transportation to healthcare  appointments? Yes   Transportation Available car   On Dialysis? No   Does the patient receive services at the Coumadin Clinic? No   Are there any open cases? No   Discharge Plan A Home;Home Health   Discharge Plan B Home with family   Patient/Family In Agreement With Plan yes

## 2017-07-11 NOTE — ASSESSMENT & PLAN NOTE
Increase OOB activity today. DC RUSSELL and change dressing @ 1500 today. Anticipate DC home tomorrow with HH

## 2017-07-11 NOTE — ANESTHESIA POST-OP PAIN MANAGEMENT
Acute Pain Service Progress Note    Aaron Ross is a 67 y.o., male, [id number].    Surgery:  TKA    Post Op Day #: 1    Catheter type: perineural  femoral    Infusion type: Ropivacaine 0.2%  8 basal    Problem List:    Active Hospital Problems    Diagnosis  POA    *Status post total left knee replacement [Z96.652]  Not Applicable    Osteoarthritis [M19.90]  Yes    Thyroid disease [E07.9]  Yes    Sleep apnea [G47.30]  Yes     USES CPAP        Resolved Hospital Problems    Diagnosis Date Resolved POA   No resolved problems to display.       Subjective:     General appearance of alert, oriented, no complaints   Pain with rest: 3    Numbers   Pain with movement: 3    Numbers   Side Effects    1. Pruritis No    2. Nausea No    3. Motor Blockade No, 0=Ability to raise lower extremities off bed    4. Sedation Yes, 1=awake and alert    Objective:     Catheter site dry intact       Vitals   Vitals:    07/11/17 1400   BP:    Pulse: 73   Resp: 18   Temp:         Labs    Admission on 07/10/2017   Component Date Value Ref Range Status    WBC 07/11/2017 9.70  3.90 - 12.70 K/uL Final    RBC 07/11/2017 4.17* 4.60 - 6.20 M/uL Final    Hemoglobin 07/11/2017 13.0* 14.0 - 18.0 g/dL Final    Hematocrit 07/11/2017 37.5* 40.0 - 54.0 % Final    MCV 07/11/2017 90  82 - 98 fL Final    MCH 07/11/2017 31.1* 27.0 - 31.0 pg Final    MCHC 07/11/2017 34.6  32.0 - 36.0 % Final    RDW 07/11/2017 14.0  11.5 - 14.5 % Final    Platelets 07/11/2017 133* 150 - 350 K/uL Final    MPV 07/11/2017 8.3* 9.2 - 12.9 fL Final    Gran # 07/11/2017 7.1  1.8 - 7.7 K/uL Final    Lymph # 07/11/2017 1.3  1.0 - 4.8 K/uL Final    Mono # 07/11/2017 1.1* 0.3 - 1.0 K/uL Final    Eos # 07/11/2017 0.1  0.0 - 0.5 K/uL Final    Baso # 07/11/2017 0.00  0.00 - 0.20 K/uL Final    Gran% 07/11/2017 73.6* 38.0 - 73.0 % Final    Lymph% 07/11/2017 13.0* 18.0 - 48.0 % Final    Mono% 07/11/2017 11.8  4.0 - 15.0 % Final    Eosinophil% 07/11/2017 1.4  0.0 - 8.0  % Final    Basophil% 07/11/2017 0.2  0.0 - 1.9 % Final    Differential Method 07/11/2017 Automated   Final    Sodium 07/11/2017 138  136 - 145 mmol/L Final    Potassium 07/11/2017 4.1  3.5 - 5.1 mmol/L Final    Chloride 07/11/2017 105  95 - 110 mmol/L Final    CO2 07/11/2017 26  23 - 29 mmol/L Final    Glucose 07/11/2017 112* 70 - 110 mg/dL Final    BUN, Bld 07/11/2017 12  8 - 23 mg/dL Final    Creatinine 07/11/2017 1.0  0.5 - 1.4 mg/dL Final    Calcium 07/11/2017 8.8  8.7 - 10.5 mg/dL Final    Anion Gap 07/11/2017 7* 8 - 16 mmol/L Final    eGFR if African American 07/11/2017 >60  >60 mL/min/1.73 m^2 Final    eGFR if non African American 07/11/2017 >60  >60 mL/min/1.73 m^2 Final        Meds   Current Facility-Administered Medications   Medication Dose Route Frequency Provider Last Rate Last Dose    aspirin tablet 325 mg  325 mg Oral BID Vasu Tamayo MD   325 mg at 07/11/17 1024    bisacodyl suppository 10 mg  10 mg Rectal Daily Vasu Tamayo MD        dextrose 5 % and 0.45 % NaCl infusion   Intravenous Continuous Vasu Tamayo MD 75 mL/hr at 07/10/17 1011      famotidine tablet 20 mg  20 mg Oral BID Vasu Tamayo MD   20 mg at 07/11/17 1024    hydrocodone-acetaminophen 10-325mg per tablet 1 tablet  1 tablet Oral Q6H PRN Cleo Adler MD   1 tablet at 07/11/17 1423    morphine injection 2 mg  2 mg Intravenous Q4H PRN Vasu Tamayo MD   2 mg at 07/11/17 1103    ondansetron disintegrating tablet 8 mg  8 mg Oral Q8H PRN Vasu Tamayo MD   8 mg at 07/11/17 1423    polyethylene glycol packet 17 g  17 g Oral Daily Vasu Tamayo MD        zolpidem tablet 5 mg  5 mg Oral Nightly PRN Vasu Tamayo MD               Assessment:     Pain control adequate    Plan:     Patient doing well, continue present treatment.    Evaluator Jasiel Barnard

## 2017-07-11 NOTE — PT/OT/SLP PROGRESS
Physical Therapy  Treatment    Aaron Ross   MRN: [id number]   Admitting Diagnosis: Status post total left knee replacement    PT Received On: 07/11/17  PT Start Time: 1330     PT Stop Time: 1357    PT Total Time (min): 27 min       Billable Minutes:  Gait [id number] and Therapeutic Activity 17    Treatment Type: Treatment  PT/PTA: PTA     PTA Visit Number: 1       General Precautions: Standard, fall  Orthopedic Precautions: LLE weight bearing as tolerated   Braces: N/A         Subjective:  Communicated with nurse Merritt prior to session.  Pt agreeable to gait despite increased pain. During gait training, pt reported feeling light-headed.     Pain/Comfort  Pain Rating 1: 8/10  Location - Side 1: Left  Location 1: knee  Pain Addressed 1: Reposition, Distraction, Cessation of Activity, Nurse notified, Pre-medicate for activity    Objective:   Patient found with: perineural catheter, peripheral IV, SCD, telemetry ([facility])    Functional Mobility:  Bed Mobility:   Supine to Sit: Minimum Assistance  Sit to Supine: Moderate Assistance, With assist of  2, With leg lift    Transfers:  Sit <> Stand Assistance: Moderate Assistance  Sit <> Stand Assistive Device: Rolling Walker  Bed <> Chair Technique: Stand Pivot  Bed <> Chair Assistance: Maximum Assistance (A of 2 for safety)  Bed <> Chair Assistive Device: Rolling Walker    Gait:   Gait Distance: 25'; gait limited 2' pt feeling light-headed and beginning to close eyes  Assistance 1: Moderate assistance  Gait Assistive Device: Rolling walker  Gait Pattern: 3-point gait  Gait Deviation(s): decreased kaleb, increased time in double stance, decreased velocity of limb motion, decreased step length, decreased stride length, decreased toe-to-floor clearance, decreased weight-shifting ability, excessive knee flexion (L knee buckling)        Balance:   Static Sit: NORMAL: No deviations seen in posture held statically  Dynamic Sit: NORMAL: No deviations seen in posture held  dynamically  Static Stand: POOR+: Needs MINIMAL assist to maintain  Dynamic stand: POOR: N/A     Therapeutic Activities and Exercises:  During gait, pt reported feeling light-headed. PTA instructed pt to sit. BP taken: 127/62 and HR 61 BPM. Pt reporting only slight relief of symptoms with sitting. Pt was then rolled back to room and assisted BTB. Nurse Shaina notified/aware.          Patient left supine with all lines intact, call button in reach and nurse Shaina notified and family members present.    Assessment:  Aaron Ross is a 67 y.o. male with a medical diagnosis of Status post total left knee replacement and presents with noted increased pain and episode of dizziness with gait this PM, limiting mobility.    Rehab identified problem list/impairments: Rehab identified problem list/impairments: weakness, impaired endurance, impaired self care skills, impaired functional mobilty, gait instability, impaired balance, decreased lower extremity function, decreased safety awareness, pain, decreased ROM, orthopedic precautions    Rehab potential is good.    Activity tolerance: Fair        GOALS:    Physical Therapy Goals        Problem: Physical Therapy Goal    Goal Priority Disciplines Outcome Goal Variances Interventions   Physical Therapy Goal    High PT/OT, PT      Description:  Goals to be met by: 17    Patient will increase functional independence with mobility by performin. Supine to sit with MInimal Assistance  2. Sit to supine with MInimal Assistance  3. Sit to stand transfer with Minimal Assistance  4. Gait  x >150 feet with Minimal Assistance using Rolling Walker.   5. Lower extremity exercise program x10 reps per handout, with supervision                      PLAN:    Patient to be seen BID  to address the above listed problems via gait training, therapeutic activities, therapeutic exercises  Plan of Care expires: 17  Plan of Care reviewed with: patient         Arcelia Bettencourt,  PTA  07/11/2017

## 2017-07-11 NOTE — PROGRESS NOTES
Progress Note  Hospital Medicine  Patient Name:Aaron Ross  MRN:  [id number]  Patient Class: IP- Inpatient  Admit Date: 7/10/2017  Length of Stay: 1 days  Expected Discharge Date:   Attending Physician: Cleo Adler MD  Primary Care Provider:  Wilfrido Lemon Iii, MD    SUBJECTIVE:     Principal Problem: Status post total left knee replacement  Initial history of present illness: Patient is a 67 y.o. male admitted to Hospitalist Service from [facility] s/p left total knee arthroplasty performed by Dr. Tamayo. Patient reportedly has past medical history significant for OA, SIMON (on CPAP), hypothyroidism and varcosities of lower extremities. Post-operatively, patient denied chest pain, shortness of breath, abdominal pain, nausea, vomiting, headache, vision changes, focal neuro-deficits, cough or fever.    PMH/PSH/SH/FH/Meds: reviewed.    Symptoms/Review of Systems:  No shortness of breath, cough, chest pain or headache, fever or abdominal pain.     Diet:  Adequate intake.    Activity level: Normal.    Pain:  Patient reports no pain.       OBJECTIVE:   Vital Signs (Most Recent):      Temp: 98.2 °F (36.8 °C) (07/11/17 0800)  Pulse: 76 (07/11/17 0830)  Resp: 18 (07/11/17 0830)  BP: 128/60 (07/11/17 0800)  SpO2: 99 % (07/11/17 0830)       Vital Signs Range (Last 24H):  Temp:  [97.5 °F (36.4 °C)-99.2 °F (37.3 °C)]   Pulse:  [48-80]   Resp:  [12-29]   BP: (113-160)/(60-85)   SpO2:  [95 %-100 %]     Weight: 113.4 kg (250 lb)  Body mass index is 33.44 kg/m².    Intake/Output Summary (Last 24 hours) at 07/11/17 0930  Last data filed at 07/11/17 0700   Gross per 24 hour   Intake          2951.25 ml   Output             5380 ml   Net         -2428.75 ml     Physical Examination:  General appearance: well developed, appears stated age  Head: normocephalic, atraumatic  Eyes:  conjunctivae/corneas clear. PERRL.  Nose: Nares normal. Septum midline.  Throat: lips, mucosa, and tongue normal; teeth and gums normal, no throat  erythema.  Neck: supple, symmetrical, trachea midline, no JVD and thyroid not enlarged, symmetric, no tenderness/mass/nodules  Lungs:  clear to auscultation bilaterally and normal respiratory effort  Chest wall: no tenderness  Heart: regular rate and rhythm, S1, S2 normal, no murmur, click, rub or gallop  Abdomen: soft, non-tender non-distented; bowel sounds normal; no masses,  no organomegaly  Extremities: no cyanosis, clubbing or edema. Left knee dressing C/D/I with a ELIGIO drain in place.  Pulses: 2+ and symmetric  Skin: Skin color, texture, turgor normal. No rashes or lesions.  Lymph nodes: Cervical, supraclavicular, and axillary nodes normal.  Neurologic: Normal strength and tone. No focal numbness or weakness. CNII-XII intact.      CBC:    Recent Labs  Lab 07/11/17  0522   WBC 9.70   RBC 4.17*   HGB 13.0*   HCT 37.5*   *   MCV 90   MCH 31.1*   MCHC 34.6   BMP    Recent Labs  Lab 07/11/17  0522   *      K 4.1      CO2 26   BUN 12   CREATININE 1.0   CALCIUM 8.8      Diagnostic Results:  Microbiology Results (last 7 days)     ** No results found for the last 168 hours. **       Left knee X-Ray: Status post left knee joint replacement.    Assessment/Plan:      *Status post total left knee replacement [Z96.652]  Continue to follow Orthopedic recommendations.  Needs aggressive incentive spirometry.  Follow hemoglobin and hematocrit closely.  Pain control with PO narcotics and antiemetics as needed.  Physical therapy as per Orthopedics protocol with fall precautions.   Not Applicable    Osteoarthritis [M19.90]  PRN Analgesics.      Yes    Thyroid disease [E07.9]  Check TSH.      Yes    Sleep apnea [G47.30]   Yes       USES CPAP         VTE Risk Mitigation         Ordered     Place sequential compression device  Until discontinued      07/10/17 1140     Medium Risk of VTE  Once      07/10/17 1140        Cleo Adler MD  Department of Hospital Medicine   [facility]

## 2017-07-11 NOTE — PROGRESS NOTES
"[facility]  Orthopedics  Progress Note    Patient Name: Aaron Ross  MRN: [id number]  Admission Date: 7/10/2017  Hospital Length of Stay: 1 days  Attending Provider: Cleo Adler MD  Primary Care Provider: Wilfrido Lemon Iii, MD  Follow-up For: Procedure(s) (LRB):  ARTHROPLASTY-KNEE (Left)    Post-Operative Day: 1 Day Post-Op  Subjective:     Principal Problem:Status post total left knee replacement    Principal Orthopedic Problem: L knee DJD    Interval History: none    Review of patient's allergies indicates:   Allergen Reactions    Adhesive Rash     MEDICAL GLUE CAUSED A RASH       Current Facility-Administered Medications   Medication    aspirin tablet 325 mg    bisacodyl suppository 10 mg    ceFAZolin (ANCEF) 1 gram in dextrose 5 % 50 mL IVPB (premix)    dextrose 5 % and 0.45 % NaCl infusion    famotidine tablet 20 mg    morphine injection 2 mg    ondansetron disintegrating tablet 8 mg    polyethylene glycol packet 17 g    zolpidem tablet 5 mg     Objective:     Vital Signs (Most Recent):  Temp: 99.2 °F (37.3 °C) (07/11/17 0500)  Pulse: 72 (07/11/17 0500)  Resp: 17 (07/11/17 0500)  BP: (!) 140/64 (07/11/17 0500)  SpO2: 98 % (07/11/17 0500) Vital Signs (24h Range):  Temp:  [97.5 °F (36.4 °C)-99.2 °F (37.3 °C)] 99.2 °F (37.3 °C)  Pulse:  [48-80] 72  Resp:  [12-29] 17  SpO2:  [95 %-100 %] 98 %  BP: (113-160)/(61-85) 140/64     Weight: 113.4 kg (250 lb)  Height: 6' 0.5" (184.2 cm)  Body mass index is 33.44 kg/m².      Intake/Output Summary (Last 24 hours) at 07/11/17 0702  Last data filed at 07/11/17 0500   Gross per 24 hour   Intake          3951.25 ml   Output             4905 ml   Net          -953.75 ml       General    Vitals reviewed.  Constitutional: He appears well-developed and well-nourished.   Pulmonary/Chest: Effort normal.   Abdominal: Soft. Bowel sounds are normal.   Neurological: He is alert.   Psychiatric: He has a normal mood and affect. His behavior is normal. " Judgment and thought content normal.             Left Knee Exam     Comments:  LLE DNVI. Dressings clean and dry. RUSSELL in place. Addison being removed now.      Significant Labs:   CBC:   Recent Labs  Lab 07/11/17  0522   WBC 9.70   HGB 13.0*   HCT 37.5*   *     CMP:   Recent Labs  Lab 07/11/17 0522      K 4.1      CO2 26   *   BUN 12   CREATININE 1.0   CALCIUM 8.8   ANIONGAP 7*   EGFRNONAA >60     All pertinent labs within the past 24 hours have been reviewed.    Significant Imaging: None    Assessment/Plan:     * Status post total left knee replacement    Increase OOB activity today. DC RUSSELL and change dressing @ 1500 today. Anticipate DC home tomorrow with ALESSANDRO LOPEZ  Orthopedics  [facility]

## 2017-07-12 VITALS
WEIGHT: 250 LBS | DIASTOLIC BLOOD PRESSURE: 80 MMHG | BODY MASS INDEX: 33.13 KG/M2 | RESPIRATION RATE: 20 BRPM | OXYGEN SATURATION: 98 % | TEMPERATURE: 99 F | SYSTOLIC BLOOD PRESSURE: 161 MMHG | HEIGHT: 73 IN | HEART RATE: 76 BPM

## 2017-07-12 LAB
ANION GAP SERPL CALC-SCNC: 6 MMOL/L
BUN SERPL-MCNC: 11 MG/DL
CALCIUM SERPL-MCNC: 8.7 MG/DL
CHLORIDE SERPL-SCNC: 104 MMOL/L
CO2 SERPL-SCNC: 27 MMOL/L
CREAT SERPL-MCNC: 1 MG/DL
EST. GFR  (AFRICAN AMERICAN): >60 ML/MIN/1.73 M^2
EST. GFR  (NON AFRICAN AMERICAN): >60 ML/MIN/1.73 M^2
GLUCOSE SERPL-MCNC: 111 MG/DL
POTASSIUM SERPL-SCNC: 3.9 MMOL/L
SODIUM SERPL-SCNC: 137 MMOL/L

## 2017-07-12 PROCEDURE — 25000003 PHARM REV CODE 250: Performed by: ORTHOPAEDIC SURGERY

## 2017-07-12 PROCEDURE — 99239 HOSP IP/OBS DSCHRG MGMT >30: CPT | Mod: ,,, | Performed by: INTERNAL MEDICINE

## 2017-07-12 PROCEDURE — 80048 BASIC METABOLIC PNL TOTAL CA: CPT

## 2017-07-12 PROCEDURE — 97110 THERAPEUTIC EXERCISES: CPT

## 2017-07-12 PROCEDURE — 94799 UNLISTED PULMONARY SVC/PX: CPT

## 2017-07-12 PROCEDURE — 97530 THERAPEUTIC ACTIVITIES: CPT

## 2017-07-12 PROCEDURE — 36415 COLL VENOUS BLD VENIPUNCTURE: CPT

## 2017-07-12 PROCEDURE — 97116 GAIT TRAINING THERAPY: CPT

## 2017-07-12 RX ORDER — HYDROCODONE BITARTRATE AND ACETAMINOPHEN 10; 325 MG/1; MG/1
1 TABLET ORAL EVERY 6 HOURS PRN
Refills: 0
Start: 2017-07-12 | End: 2018-09-18

## 2017-07-12 RX ORDER — ASPIRIN 325 MG
325 TABLET ORAL 2 TIMES DAILY
Refills: 0 | COMMUNITY
Start: 2017-07-12 | End: 2018-11-26

## 2017-07-12 RX ADMIN — ZOLPIDEM TARTRATE 5 MG: 5 TABLET, FILM COATED ORAL at 02:07

## 2017-07-12 RX ADMIN — FAMOTIDINE 20 MG: 20 TABLET, FILM COATED ORAL at 08:07

## 2017-07-12 RX ADMIN — ASPIRIN 325 MG ORAL TABLET 325 MG: 325 PILL ORAL at 08:07

## 2017-07-12 NOTE — PT/OT/SLP PROGRESS
Physical Therapy  Treatment    Aaron Ross   MRN: [id number]   Admitting Diagnosis: Status post total left knee replacement    PT Received On: 07/12/17  PT Start Time: 1005     PT Stop Time: 1031    PT Total Time (min): 26 min       Billable Minutes:  Gait [id number], Therapeutic Activity 8 and Therapeutic Exercise 8    Treatment Type: Treatment  PT/PTA: PTA     PTA Visit Number: 2       General Precautions: Standard, fall  Orthopedic Precautions: LLE weight bearing as tolerated   Braces: N/A         Subjective:  Communicated with nurse Iliana prior to session.  Pt agreeable to therapy. Reported feeling better than yesterday.    Pain/Comfort  Pain Rating 1: 6/10  Location - Side 1: Left  Location 1: knee  Pain Addressed 1: Reposition, Distraction, Cessation of Activity, Nurse notified    Objective:   Patient found with: perineural catheter, peripheral IV, SCD, telemetry, Polar ice    Functional Mobility:  Bed Mobility:   Supine to Sit: Minimum Assistance    Transfers:  Sit <> Stand Assistance: Minimum Assistance (with cues for proper tech and safety as pt impulsive )  Sit <> Stand Assistive Device: Rolling Walker  Bed <> Chair Technique: Stand Pivot  Bed <> Chair Assistance: Minimum Assistance  Bed <> Chair Assistive Device: Rolling Walker    Gait:   Gait Distance: 180'  Assistance 1: Minimum assistance (with cues for proper sequencing and safety with gait)  Gait Assistive Device: Rolling walker  Gait Pattern: 3-point gait  Gait Deviation(s): decreased kaleb, increased time in double stance, decreased velocity of limb motion, decreased step length, decreased stride length, decreased toe-to-floor clearance, decreased weight-shifting ability, excessive knee flexion      Balance:   Static Sit: NORMAL: No deviations seen in posture held statically  Dynamic Sit: NORMAL: No deviations seen in posture held dynamically  Static Stand: FAIR: Maintains without assist but unable to take challenges  Dynamic stand: FAIR:  Needs CONTACT GUARD during gait     Therapeutic Activities and Exercises:  Seated BLE therex: AP, quad sets, SLR, hip abd/add with AAROM L x 10-15 reps each         Patient left up in chair with all lines intact, call button in reach and nurse Iliana notified.    Assessment:  Aaron Ross is a 67 y.o. male with a medical diagnosis of Status post total left knee replacement and presents with improved gait tolerance/distance this AM. Pt remains impulsive requiring constant cueing for safety.     Rehab identified problem list/impairments: Rehab identified problem list/impairments: weakness, impaired endurance, impaired self care skills, impaired functional mobilty, gait instability, impaired balance, decreased lower extremity function, decreased safety awareness, pain, decreased ROM, orthopedic precautions    Rehab potential is good.    Activity tolerance: Good        GOALS:    Physical Therapy Goals        Problem: Physical Therapy Goal    Goal Priority Disciplines Outcome Goal Variances Interventions   Physical Therapy Goal    High PT/OT, PT Ongoing (interventions implemented as appropriate)     Description:  Goals to be met by: 17    Patient will increase functional independence with mobility by performin. Supine to sit with MInimal Assistance  2. Sit to supine with MInimal Assistance  3. Sit to stand transfer with Minimal Assistance  4. Gait  x >150 feet with Minimal Assistance using Rolling Walker.   5. Lower extremity exercise program x10 reps per handout, with supervision                      PLAN:    Patient to be seen BID  to address the above listed problems via gait training, therapeutic activities, therapeutic exercises  Plan of Care expires: 17  Plan of Care reviewed with: patient         Arcelia HATTIE Bettencourt, PTA  2017

## 2017-07-12 NOTE — ANESTHESIA POST-OP PAIN MANAGEMENT
Acute Pain Service Progress Note    Aaron Ross is a 67 y.o., male, [id number].    Surgery:  *Knee arthroplasty    Post Op Day #: 2    Catheter type: perineural  femoral    Infusion type: Ropivacaine 0.1% with Fentanyl 5mcg/mL  8 basal    Problem List:    Active Hospital Problems    Diagnosis  POA    *Status post total left knee replacement [Z96.652]  Not Applicable    Osteoarthritis [M19.90]  Yes    Thyroid disease [E07.9]  Yes    Sleep apnea [G47.30]  Yes     USES CPAP        Resolved Hospital Problems    Diagnosis Date Resolved POA   No resolved problems to display.       Subjective:     General appearance of alert, oriented, no complaints   Pain with rest: 2    Numbers   Pain with movement: 2    Numbers   Side Effects    1. Pruritis No    2. Nausea No    3. Motor Blockade Yes, 2=Inability to bend knees    4. Sedation No, 1=awake and alert    Objective:     Catheter level    Catheter site clean, dry, intact   Catheter placement       Vitals   Vitals:    07/12/17 0800   BP: (!) 143/83   Pulse: 77   Resp: 18   Temp: 37.7 °C (99.8 °F)        Labs    Admission on 07/10/2017   Component Date Value Ref Range Status    WBC 07/11/2017 9.70  3.90 - 12.70 K/uL Final    RBC 07/11/2017 4.17* 4.60 - 6.20 M/uL Final    Hemoglobin 07/11/2017 13.0* 14.0 - 18.0 g/dL Final    Hematocrit 07/11/2017 37.5* 40.0 - 54.0 % Final    MCV 07/11/2017 90  82 - 98 fL Final    MCH 07/11/2017 31.1* 27.0 - 31.0 pg Final    MCHC 07/11/2017 34.6  32.0 - 36.0 % Final    RDW 07/11/2017 14.0  11.5 - 14.5 % Final    Platelets 07/11/2017 133* 150 - 350 K/uL Final    MPV 07/11/2017 8.3* 9.2 - 12.9 fL Final    Gran # 07/11/2017 7.1  1.8 - 7.7 K/uL Final    Lymph # 07/11/2017 1.3  1.0 - 4.8 K/uL Final    Mono # 07/11/2017 1.1* 0.3 - 1.0 K/uL Final    Eos # 07/11/2017 0.1  0.0 - 0.5 K/uL Final    Baso # 07/11/2017 0.00  0.00 - 0.20 K/uL Final    Gran% 07/11/2017 73.6* 38.0 - 73.0 % Final    Lymph% 07/11/2017 13.0* 18.0 - 48.0 %  Final    Mono% 07/11/2017 11.8  4.0 - 15.0 % Final    Eosinophil% 07/11/2017 1.4  0.0 - 8.0 % Final    Basophil% 07/11/2017 0.2  0.0 - 1.9 % Final    Differential Method 07/11/2017 Automated   Final    Sodium 07/11/2017 138  136 - 145 mmol/L Final    Potassium 07/11/2017 4.1  3.5 - 5.1 mmol/L Final    Chloride 07/11/2017 105  95 - 110 mmol/L Final    CO2 07/11/2017 26  23 - 29 mmol/L Final    Glucose 07/11/2017 112* 70 - 110 mg/dL Final    BUN, Bld 07/11/2017 12  8 - 23 mg/dL Final    Creatinine 07/11/2017 1.0  0.5 - 1.4 mg/dL Final    Calcium 07/11/2017 8.8  8.7 - 10.5 mg/dL Final    Anion Gap 07/11/2017 7* 8 - 16 mmol/L Final    eGFR if African American 07/11/2017 >60  >60 mL/min/1.73 m^2 Final    eGFR if non African American 07/11/2017 >60  >60 mL/min/1.73 m^2 Final    Sodium 07/12/2017 137  136 - 145 mmol/L Final    Potassium 07/12/2017 3.9  3.5 - 5.1 mmol/L Final    Chloride 07/12/2017 104  95 - 110 mmol/L Final    CO2 07/12/2017 27  23 - 29 mmol/L Final    Glucose 07/12/2017 111* 70 - 110 mg/dL Final    BUN, Bld 07/12/2017 11  8 - 23 mg/dL Final    Creatinine 07/12/2017 1.0  0.5 - 1.4 mg/dL Final    Calcium 07/12/2017 8.7  8.7 - 10.5 mg/dL Final    Anion Gap 07/12/2017 6* 8 - 16 mmol/L Final    eGFR if African American 07/12/2017 >60  >60 mL/min/1.73 m^2 Final    eGFR if non African American 07/12/2017 >60  >60 mL/min/1.73 m^2 Final        Meds   Current Facility-Administered Medications   Medication Dose Route Frequency Provider Last Rate Last Dose    aspirin tablet 325 mg  325 mg Oral BID Vasu Tamayo MD   325 mg at 07/12/17 0840    bisacodyl suppository 10 mg  10 mg Rectal Daily Vasu Tamayo MD        dextrose 5 % and 0.45 % NaCl infusion   Intravenous Continuous Vasu Tamayo MD 75 mL/hr at 07/11/17 1739      famotidine tablet 20 mg  20 mg Oral BID Vasu Tamayo MD   20 mg at 07/12/17 0840    hydrocodone-acetaminophen 10-325mg per tablet 1 tablet  1 tablet Oral Q6H PRN Aqib  MD Nayely   1 tablet at 07/11/17 2100    morphine injection 2 mg  2 mg Intravenous Q4H PRN Vasu Tamayo MD   2 mg at 07/11/17 1103    ondansetron disintegrating tablet 8 mg  8 mg Oral Q8H PRN Vasu Tamayo MD   8 mg at 07/11/17 1423    polyethylene glycol packet 17 g  17 g Oral Daily Vasu Tamayo MD        zolpidem tablet 5 mg  5 mg Oral Nightly PRN Vasu Tamayo MD   5 mg at 07/12/17 0225        Anticoagulant dose     Assessment:     Pain control adequate    Plan:     Patient doing well, continue present treatment.    Evaluator Demetrius Pyle

## 2017-07-12 NOTE — PLAN OF CARE
Problem: Patient Care Overview  Goal: Plan of Care Review  Outcome: Ongoing (interventions implemented as appropriate)  Pt on room air with IS, pt achieves 2250+ on IS, pt ready for self admin

## 2017-07-12 NOTE — PLAN OF CARE
Problem: Physical Therapy Goal  Goal: Physical Therapy Goal  Goals to be met by: 17    Patient will increase functional independence with mobility by performin. Supine to sit with MInimal Assistance  2. Sit to supine with MInimal Assistance  3. Sit to stand transfer with Minimal Assistance  4. Gait  x >150 feet with Minimal Assistance using Rolling Walker.   5. Lower extremity exercise program x10 reps per handout, with supervision     Outcome: Ongoing (interventions implemented as appropriate)  PT for gait training, bed mobility, BLE therex and OOB to chair; BID.

## 2017-07-12 NOTE — PT/OT/SLP PROGRESS
Physical Therapy  Treatment    Aaron Ross   MRN: [id number]   Admitting Diagnosis: Status post total left knee replacement    PT Received On: 07/12/17  PT Start Time: 1332     PT Stop Time: 1345    PT Total Time (min): 13 min       Billable Minutes:  Gait [id number]    Treatment Type: Treatment  PT/PTA: PTA     PTA Visit Number: 2       General Precautions: Standard, fall  Orthopedic Precautions: LLE weight bearing as tolerated   Braces: N/A         Subjective:  Communicated with nurse Iliana prior to session.  Pt agreeable to gait.    Pain/Comfort  Pain Rating 1: 2/10  Location - Side 1: Left  Location 1: knee  Pain Addressed 1: Pre-medicate for activity, Nurse notified    Objective:   Patient found with: peripheral IV    Functional Mobility:  Bed Mobility:   Supine to Sit: Minimum Assistance    Transfers:  Sit <> Stand Assistance: Minimum Assistance (pt again began to stand without assistance or walker infront of him. Reiterated safety precautions and improtance to pt)  Sit <> Stand Assistive Device: Rolling Walker  Bed <> Chair Technique: Stand Pivot  Bed <> Chair Assistance: Minimum Assistance  Bed <> Chair Assistive Device: Rolling Walker    Gait:   Gait Distance: 180'  Assistance 1: Minimum assistance (cues for safety required with turns)  Gait Assistive Device: Rolling walker  Gait Pattern: 3-point gait  Gait Deviation(s): decreased kaleb, increased time in double stance, decreased velocity of limb motion, decreased step length, decreased stride length, decreased weight-shifting ability, excessive knee flexion        Balance:   Static Sit: NORMAL: No deviations seen in posture held statically  Dynamic Sit: NORMAL: No deviations seen in posture held dynamically  Static Stand: FAIR: Maintains without assist but unable to take challenges  Dynamic stand: POOR: N/A         Patient left up in chair with all lines intact, call button in reach, nurse notified and family present.    Assessment:  Aaron Thorne  Cody is a 67 y.o. male with a medical diagnosis of Status post total left knee replacement and presents with continued impulsivity and decreased safety. Pt with overall good tolerance to gait/mobility despite pain.     Rehab identified problem list/impairments: Rehab identified problem list/impairments: weakness, impaired endurance, impaired functional mobilty, gait instability, impaired balance, decreased lower extremity function, decreased safety awareness, pain, decreased ROM, orthopedic precautions    Rehab potential is good.    Activity tolerance: Good        GOALS:    Physical Therapy Goals        Problem: Physical Therapy Goal    Goal Priority Disciplines Outcome Goal Variances Interventions   Physical Therapy Goal    High PT/OT, PT Ongoing (interventions implemented as appropriate)     Description:  Goals to be met by: 17    Patient will increase functional independence with mobility by performin. Supine to sit with MInimal Assistance  2. Sit to supine with MInimal Assistance  3. Sit to stand transfer with Minimal Assistance  4. Gait  x >150 feet with Minimal Assistance using Rolling Walker.   5. Lower extremity exercise program x10 reps per handout, with supervision                      PLAN:    Patient to be seen BID  to address the above listed problems via gait training, therapeutic activities, therapeutic exercises  Plan of Care expires: 17  Plan of Care reviewed with: patient         Arcelia HATTIE Bettencourt, PTA  2017

## 2017-07-12 NOTE — PROGRESS NOTES
Drsg change completed. Surgical drsg removed. Drain removed and pressure held to site to prevent bleeding. Steri-strips in place. Incision WDL. Site wrapped with Kerlix dsrg and taped. Pt tolerated well.

## 2017-07-12 NOTE — PLAN OF CARE
07/11/17 2025   Patient Assessment/Suction   Level of Consciousness (AVPU) alert   PRE-TX-O2-ETCO2   O2 Device (Oxygen Therapy) room air   SpO2 97 %   Pulse Oximetry Type Intermittent   Incentive Spirometer   $ Incentive Spirometer Charges done independently per patient

## 2017-07-12 NOTE — PROGRESS NOTES
"[facility]  Orthopedics  Progress Note    Patient Name: Aaron Ross  MRN: [id number]  Admission Date: 7/10/2017  Hospital Length of Stay: 2 days  Attending Provider: Cleo Adler MD  Primary Care Provider: Wilfrido Lemon Iii, MD  Follow-up For: Procedure(s) (LRB):  ARTHROPLASTY-KNEE (Left)    Post-Operative Day: 2 Days Post-Op  Subjective:     Principal Problem:Status post total left knee replacement    Principal Orthopedic Problem: S/P L TKA    Interval History: none    Review of patient's allergies indicates:   Allergen Reactions    Adhesive Rash     MEDICAL GLUE CAUSED A RASH       Current Facility-Administered Medications   Medication    aspirin tablet 325 mg    bisacodyl suppository 10 mg    dextrose 5 % and 0.45 % NaCl infusion    famotidine tablet 20 mg    hydrocodone-acetaminophen 10-325mg per tablet 1 tablet    morphine injection 2 mg    ondansetron disintegrating tablet 8 mg    polyethylene glycol packet 17 g    zolpidem tablet 5 mg     Objective:     Vital Signs (Most Recent):  Temp: 100 °F (37.8 °C) (07/12/17 0342)  Pulse: 77 (07/12/17 0342)  Resp: 17 (07/12/17 0342)  BP: (!) 171/73 (nurse notified) (07/12/17 0342)  SpO2: (!) 93 % (07/12/17 0342) Vital Signs (24h Range):  Temp:  [98.2 °F (36.8 °C)-100 °F (37.8 °C)] 100 °F (37.8 °C)  Pulse:  [68-78] 77  Resp:  [17-20] 17  SpO2:  [93 %-99 %] 93 %  BP: (116-171)/(60-88) 171/73     Weight: 113.4 kg (250 lb)  Height: 6' 0.5" (184.2 cm)  Body mass index is 33.44 kg/m².      Intake/Output Summary (Last 24 hours) at 07/12/17 0733  Last data filed at 07/12/17 0607   Gross per 24 hour   Intake             2825 ml   Output             2275 ml   Net              550 ml       General    Vitals reviewed.  Constitutional: He is oriented to person, place, and time. He appears well-developed and well-nourished.   Pulmonary/Chest: Effort normal.   Abdominal: Soft. Bowel sounds are normal.   Neurological: He is alert and oriented to " person, place, and time.   Psychiatric: His behavior is normal.             Left Knee Exam     Comments:  Incision clean and dry. LLE DNVI      Significant Labs:   CBC:   Recent Labs  Lab 07/11/17 0522   WBC 9.70   HGB 13.0*   HCT 37.5*   *     CMP:   Recent Labs  Lab 07/11/17 0522 07/12/17  0523    137   K 4.1 3.9    104   CO2 26 27   * 111*   BUN 12 11   CREATININE 1.0 1.0   CALCIUM 8.8 8.7   ANIONGAP 7* 6*   EGFRNONAA >60 >60     All pertinent labs within the past 24 hours have been reviewed.    Significant Imaging: None    Assessment/Plan:     * Status post total left knee replacement    Should be able to go home today per hospitalist.              ALESSANDRO VALENTIN  Orthopedics  [facility]

## 2017-07-12 NOTE — DISCHARGE SUMMARY
Discharge Summary  Hospital Medicine    Admit Date: 7/10/2017    Date and Time: 7/12/20179:47 AM    Discharge Attending Physician: Cleo Adler MD    Primary Care Physician: Wilfrido Lemon Iii, MD    Diagnoses:  Active Hospital Problems    Diagnosis  POA    *Status post total left knee replacement [Z96.652]  Not Applicable    Osteoarthritis [M19.90]  Yes    Thyroid disease [E07.9]  Yes    Sleep apnea [G47.30]  Yes     USES CPAP        Resolved Hospital Problems    Diagnosis Date Resolved POA   No resolved problems to display.     Discharged Condition: Good    Hospital Course:   Patient is a 67 y.o. male admitted to Hospitalist Service from Operation Room s/p left total knee arthroplasty performed by Dr. Tamayo. Patient reportedly has past medical history significant for OA, SIMON (on CPAP), hypothyroidism and varcosities of lower extremities. Post-operatively, patient denied chest pain, shortness of breath, abdominal pain, nausea, vomiting, headache, vision changes, focal neuro-deficits, cough or fever. Patient was admitted to Hospitalist medicine service. Patient was followed by orthopedics. Post-operative, patient did well. Pain adequately controlled. Patient participated with physical therapy. Home health and home physical therapy has been arranged. Fall precautions discussed with the patient. Patient to follow up with primary care physician next week and orthopedic doctor in 2 weeks. Post-operative anti-coagulation as per orthopedics recommendations advised. In case of chest pain, shortness of breath, stroke or stroke like symptoms, high grade fever or any signs or symptoms of surgical site wound infection symptoms, patient to return to nearest emergency room as soon as possible. Patient was discharged home in stable condition with following discharge plan of care. Total time with the patient was 30 minutes and greater than 50% was spent in counseling and coordination of care. The assessment and plan have  "been discussed at length. Physicians' notes reviewed. Labs and procedure reviewed.     Consults: Dr. Tamayo    Significant Diagnostic Studies:   Left knee X-Ray: Status post left knee joint replacement.    Microbiology Results (last 7 days)     ** No results found for the last 168 hours. **        Special Treatments/Procedures: as above  Disposition: Home or Self Care    Medications:  Reconciled Home Medications: Current Discharge Medication List      START taking these medications    Details   aspirin 325 MG tablet Take 1 tablet (325 mg total) by mouth 2 (two) times daily.  Refills: 0      hydrocodone-acetaminophen 10-325mg (NORCO)  mg Tab Take 1 tablet by mouth every 6 (six) hours as needed.  Refills: 0         CONTINUE these medications which have NOT CHANGED    Details   clobetasol (CLOBEX) 0.05 % shampoo Apply topically twice a week.      doxycycline (VIBRAMYCIN) 100 MG Cap Take 100 mg by mouth every 12 (twelve) hours.             Discharge Procedure Orders  WALKER FOR HOME USE   Order Specific Question Answer Comments   Type of Walker: Adult (5'4"-6'6")    With wheels? Yes 2 front    Height: 6' 0.5" (1.842 m)    Weight: 113.4 kg (250 lb)    Length of need (1-99 months): 11    Does patient have medical equipment at home? none    Please check all that apply: Patient's condition impairs ambulation.      Ambulatory referral to Home Health   Referral Priority: Routine Referral Type: Home Health   Referral Reason: Specialty Services Required    Requested Specialty: Home Health Services    Number of Visits Requested: 1      Diet general   Order Comments: Cardiac/ 2 gram sodium low cholesterol diet     Other restrictions (specify):   Order Comments: Fall precautions     Call MD for:   Order Comments: For worsening symptoms, chest pain, shortness of breath, increased abdominal pain, high grade fever, stroke or stroke like symptoms, immediately go to the nearest Emergency Room or call 911 as soon as possible. "       Follow-up Information     Vasu Tamayo MD In 2 weeks.    Specialty:  Orthopedic Surgery  Contact information:  [address]  [address]  [place] [address] [phone]             Wilfrido Lemon Iii, MD In 1 week.    Specialty:  Family Medicine  Contact information:  [address]  [place] [address]  [phone]

## 2017-07-12 NOTE — PLAN OF CARE
No prescriptions for patient in chart. Placed call to Dr. Tamayo's office. Nurse stated to have patient or family member drop by office to  rx. Patient's family agreed.

## 2017-07-12 NOTE — PLAN OF CARE
PAULINA sent home health referral to his preference home health agency, [facility] via [facility], awaiting acceptance PAULINA Garcia sent dme order for walker to [facility], awaiting approval.     1437- Per Josette peters/ [facility], patient has been accepted and will be seen on tomorrow. PAULINA Garcia     1437- Called luis felipe Rick/ [facility]. Patient approved for walker and delivery ETA 30 mins. PAULINA Garcia

## 2017-07-12 NOTE — PLAN OF CARE
Problem: Patient Care Overview  Goal: Plan of Care Review  Outcome: Ongoing (interventions implemented as appropriate)  Pt had a decent night free from fall or injury.  Pt's VSS with minimal c/o pain relieved by PRN pain medication.  Gauze dressing remains intact with cryotherapy in use.  SCD pumps in place with IVF infusing through a patent PIV.  Pt tolerating PO and voiding per urinal.  No BM during shift but pt refused scheduled stool softeners.  Nurse encouraged pt to take such medications to prevent constipation.  No new skin breakdown noted.  Pt slept well with sleep aid; home C-PAP in use while sleeping.  Bed side rails raised x2 with call bell within reach.  Nurse rounded q2 hrs to ensure pt safety.

## 2017-07-12 NOTE — SUBJECTIVE & OBJECTIVE
"Principal Problem:Status post total left knee replacement    Principal Orthopedic Problem: S/P L TKA    Interval History: none    Review of patient's allergies indicates:   Allergen Reactions    Adhesive Rash     MEDICAL GLUE CAUSED A RASH       Current Facility-Administered Medications   Medication    aspirin tablet 325 mg    bisacodyl suppository 10 mg    dextrose 5 % and 0.45 % NaCl infusion    famotidine tablet 20 mg    hydrocodone-acetaminophen 10-325mg per tablet 1 tablet    morphine injection 2 mg    ondansetron disintegrating tablet 8 mg    polyethylene glycol packet 17 g    zolpidem tablet 5 mg     Objective:     Vital Signs (Most Recent):  Temp: 100 °F (37.8 °C) (07/12/17 0342)  Pulse: 77 (07/12/17 0342)  Resp: 17 (07/12/17 0342)  BP: (!) 171/73 (nurse notified) (07/12/17 0342)  SpO2: (!) 93 % (07/12/17 0342) Vital Signs (24h Range):  Temp:  [98.2 °F (36.8 °C)-100 °F (37.8 °C)] 100 °F (37.8 °C)  Pulse:  [68-78] 77  Resp:  [17-20] 17  SpO2:  [93 %-99 %] 93 %  BP: (116-171)/(60-88) 171/73     Weight: 113.4 kg (250 lb)  Height: 6' 0.5" (184.2 cm)  Body mass index is 33.44 kg/m².      Intake/Output Summary (Last 24 hours) at 07/12/17 0733  Last data filed at 07/12/17 0607   Gross per 24 hour   Intake             2825 ml   Output             2275 ml   Net              550 ml       General    Vitals reviewed.  Constitutional: He is oriented to person, place, and time. He appears well-developed and well-nourished.   Pulmonary/Chest: Effort normal.   Abdominal: Soft. Bowel sounds are normal.   Neurological: He is alert and oriented to person, place, and time.   Psychiatric: His behavior is normal.             Left Knee Exam     Comments:  Incision clean and dry. LLE DNVI      Significant Labs:   CBC:   Recent Labs  Lab 07/11/17 0522   WBC 9.70   HGB 13.0*   HCT 37.5*   *     CMP:   Recent Labs  Lab 07/11/17  0522 07/12/17  0523    137   K 4.1 3.9    104   CO2 26 27   * 111* "   BUN 12 11   CREATININE 1.0 1.0   CALCIUM 8.8 8.7   ANIONGAP 7* 6*   EGFRNONAA >60 >60     All pertinent labs within the past 24 hours have been reviewed.    Significant Imaging: None

## 2017-07-12 NOTE — PLAN OF CARE
07/12/17 1415   Discharge Assessment   Assessment Type Discharge Planning Reassessment   CM made followup appointment with Dr. Tamayo and added to AVS.  CM attempted to make followup appt with Dr. Lemon but office closed at noon today.  Instructions on AVS for patient to make followup appointment.

## 2017-07-13 NOTE — PLAN OF CARE
07/13/17 0811   Final Note   Assessment Type Final Discharge Note   Discharge Disposition Home-Health   Discharge planning education complete? Yes

## 2017-11-30 PROBLEM — E53.8 B12 DEFICIENCY: Status: ACTIVE | Noted: 2017-11-30

## 2017-11-30 PROBLEM — R41.89 COGNITIVE DECLINE: Status: ACTIVE | Noted: 2017-11-30

## 2017-11-30 PROBLEM — N41.0 ACUTE PROSTATITIS: Status: ACTIVE | Noted: 2017-11-30

## 2017-11-30 PROBLEM — Z12.5 PROSTATE CANCER SCREENING: Status: ACTIVE | Noted: 2017-11-30

## 2018-04-24 ENCOUNTER — HISTORICAL (OUTPATIENT)
Dept: ADMINISTRATIVE | Facility: HOSPITAL | Age: 68
End: 2018-04-24

## 2018-04-24 LAB
ALBUMIN SERPL-MCNC: 3.7 G/DL (ref 3.1–4.7)
ALP SERPL-CCNC: 94 IU/L (ref 40–104)
ALT (SGPT): 21 IU/L (ref 3–33)
AST SERPL-CCNC: 23 IU/L (ref 10–40)
BILIRUB SERPL-MCNC: 0.6 MG/DL (ref 0.3–1)
BUN SERPL-MCNC: 10 MG/DL (ref 8–20)
CALCIUM SERPL-MCNC: 9 MG/DL (ref 7.7–10.4)
CHLORIDE: 103 MMOL/L (ref 98–110)
CK SERPL-CCNC: 145 IU/L (ref 18–170)
CO2 SERPL-SCNC: 29.7 MMOL/L (ref 22.8–31.6)
CREATININE: 1 MG/DL (ref 0.6–1.4)
GLUCOSE: 89 MG/DL (ref 70–99)
POTASSIUM SERPL-SCNC: 4.1 MMOL/L (ref 3.5–5)
PROT SERPL-MCNC: 6.6 G/DL (ref 6–8.2)
SODIUM: 136 MMOL/L (ref 134–144)
TROPONIN I SERPL DL<=0.01 NG/ML-MCNC: <0.03 NG/ML (ref 0.03–0.04)

## 2018-04-25 LAB
BASOPHILS NFR BLD: 0 K/UL (ref 0–0.2)
BASOPHILS NFR BLD: 0.5 %
EOSINOPHIL NFR BLD: 0.1 K/UL (ref 0–0.7)
EOSINOPHIL NFR BLD: 1.7 %
ERYTHROCYTE [DISTWIDTH] IN BLOOD BY AUTOMATED COUNT: 12.8 % (ref 11.7–14.9)
GRAN #: 3.3 K/UL (ref 1.4–6.5)
GRAN%: 52.4 %
HCT VFR BLD AUTO: 43.1 % (ref 39–55)
HGB BLD-MCNC: 14.2 G/DL (ref 14–16)
IMMATURE GRANS (ABS): 0 K/UL (ref 0–1)
IMMATURE GRANULOCYTES: 0.3 %
LYMPH #: 2.1 K/UL (ref 1.2–3.4)
LYMPH%: 33.5 %
MCH RBC QN AUTO: 31.1 PG (ref 25–35)
MCHC RBC AUTO-ENTMCNC: 32.9 G/DL (ref 31–36)
MCV RBC AUTO: 94.3 FL (ref 80–100)
MONO #: 0.7 K/UL (ref 0.1–0.6)
MONO%: 11.6 %
NUCLEATED RBCS: 0 %
PLATELET # BLD AUTO: 168 K/UL (ref 140–440)
PMV BLD AUTO: 9.6 FL (ref 8.8–12.7)
RBC # BLD AUTO: 4.57 M/UL (ref 4.3–5.9)
WBC # BLD AUTO: 6.4 K/UL (ref 5–10)

## 2018-05-07 PROBLEM — I10 HYPERTENSION: Status: ACTIVE | Noted: 2018-05-07

## 2018-05-07 PROBLEM — N40.0 BENIGN PROSTATIC HYPERPLASIA: Status: ACTIVE | Noted: 2018-05-07

## 2018-05-07 PROBLEM — R07.9 CHEST PAIN: Status: ACTIVE | Noted: 2018-05-07

## 2018-06-25 PROBLEM — Z79.82 ASPIRIN LONG-TERM USE: Status: ACTIVE | Noted: 2018-06-25

## 2018-09-18 ENCOUNTER — OFFICE VISIT (OUTPATIENT)
Dept: ORTHOPEDICS | Facility: CLINIC | Age: 68
End: 2018-09-18
Payer: MEDICARE

## 2018-09-18 VITALS
DIASTOLIC BLOOD PRESSURE: 90 MMHG | WEIGHT: 262 LBS | HEIGHT: 73 IN | SYSTOLIC BLOOD PRESSURE: 138 MMHG | BODY MASS INDEX: 34.72 KG/M2

## 2018-09-18 DIAGNOSIS — Z96.652 HISTORY OF TOTAL KNEE ARTHROPLASTY, LEFT: Primary | ICD-10-CM

## 2018-09-18 PROCEDURE — 99213 OFFICE O/P EST LOW 20 MIN: CPT | Mod: 25,,, | Performed by: ORTHOPAEDIC SURGERY

## 2018-09-18 PROCEDURE — 73562 X-RAY EXAM OF KNEE 3: CPT | Mod: LT,,, | Performed by: ORTHOPAEDIC SURGERY

## 2018-11-16 PROBLEM — I10 HYPERTENSION, ESSENTIAL: Status: ACTIVE | Noted: 2018-11-16

## 2018-11-24 PROBLEM — R06.00 DYSPNEA: Status: ACTIVE | Noted: 2018-11-24

## 2018-11-24 PROBLEM — G47.33 OSA ON CPAP: Status: ACTIVE | Noted: 2017-07-10

## 2018-11-24 PROBLEM — R41.89 COGNITIVE IMPAIRMENT: Status: ACTIVE | Noted: 2018-11-24

## 2018-11-24 PROBLEM — K64.9 HEMORRHOIDS: Status: ACTIVE | Noted: 2018-11-24

## 2018-11-28 PROBLEM — I25.10 ASCVD (ARTERIOSCLEROTIC CARDIOVASCULAR DISEASE): Status: ACTIVE | Noted: 2018-11-28

## 2019-04-06 PROBLEM — E78.5 HYPERLIPIDEMIA: Status: ACTIVE | Noted: 2019-04-06

## 2019-04-06 PROBLEM — I10 HYPERTENSION: Status: RESOLVED | Noted: 2018-05-07 | Resolved: 2019-04-06

## 2019-08-13 ENCOUNTER — OFFICE VISIT (OUTPATIENT)
Dept: SURGERY | Facility: CLINIC | Age: 69
End: 2019-08-13
Payer: MEDICARE

## 2019-08-13 VITALS
HEART RATE: 59 BPM | TEMPERATURE: 98 F | BODY MASS INDEX: 35.73 KG/M2 | DIASTOLIC BLOOD PRESSURE: 69 MMHG | WEIGHT: 269.63 LBS | HEIGHT: 73 IN | SYSTOLIC BLOOD PRESSURE: 126 MMHG

## 2019-08-13 DIAGNOSIS — K64.4 RESIDUAL HEMORRHOIDAL SKIN TAGS: Primary | ICD-10-CM

## 2019-08-13 PROCEDURE — 99204 PR OFFICE/OUTPT VISIT, NEW, LEVL IV, 45-59 MIN: ICD-10-PCS | Mod: S$PBB,25,, | Performed by: SURGERY

## 2019-08-13 PROCEDURE — 99999 PR PBB SHADOW E&M-EST. PATIENT-LVL III: CPT | Mod: PBBFAC,,, | Performed by: SURGERY

## 2019-08-13 PROCEDURE — 46600 DIAGNOSTIC ANOSCOPY SPX: CPT | Mod: S$PBB,,, | Performed by: SURGERY

## 2019-08-13 PROCEDURE — 46600 PR DIAG2STIC A2SCOPY: ICD-10-PCS | Mod: S$PBB,,, | Performed by: SURGERY

## 2019-08-13 PROCEDURE — 46600 DIAGNOSTIC ANOSCOPY SPX: CPT | Mod: PBBFAC,PO | Performed by: SURGERY

## 2019-08-13 PROCEDURE — 99999 PR PBB SHADOW E&M-EST. PATIENT-LVL III: ICD-10-PCS | Mod: PBBFAC,,, | Performed by: SURGERY

## 2019-08-13 PROCEDURE — 99213 OFFICE O/P EST LOW 20 MIN: CPT | Mod: PBBFAC,PO,25 | Performed by: SURGERY

## 2019-08-13 PROCEDURE — 99204 OFFICE O/P NEW MOD 45 MIN: CPT | Mod: S$PBB,25,, | Performed by: SURGERY

## 2019-08-13 RX ORDER — CETIRIZINE HYDROCHLORIDE 10 MG/1
10 TABLET ORAL DAILY
COMMUNITY
End: 2020-01-30

## 2019-08-13 RX ORDER — KETOCONAZOLE 20 MG/ML
1 SHAMPOO, SUSPENSION TOPICAL
COMMUNITY
Start: 2018-11-25 | End: 2020-01-30 | Stop reason: SDUPTHER

## 2019-08-13 RX ORDER — NEOMYCIN SULFATE, POLYMYXIN B SULFATE AND DEXAMETHASONE 3.5; 10000; 1 MG/ML; [USP'U]/ML; MG/ML
1 SUSPENSION/ DROPS OPHTHALMIC
COMMUNITY
Start: 2018-10-18 | End: 2020-01-30

## 2019-08-13 RX ORDER — SELENIUM SULFIDE 2.5 MG/100ML
LOTION TOPICAL
Refills: 6 | COMMUNITY
Start: 2019-06-05 | End: 2020-01-30 | Stop reason: SDUPTHER

## 2019-08-13 RX ORDER — FLUCONAZOLE 200 MG/1
1 TABLET ORAL
Refills: 1 | COMMUNITY
Start: 2019-08-06 | End: 2020-01-30

## 2019-08-13 RX ORDER — ASPIRIN 81 MG/1
81 TABLET ORAL DAILY
COMMUNITY
Start: 2018-05-07

## 2019-08-13 NOTE — LETTER
August 13, 2019      Spencer Elmore MD  [address]  [place] [address]           [facility] - General Surgery  [address] [facility] [address]  [place] [address]  Phone: [phone]          Patient: Aaron Ross   MR Number: [id number]   YOB: 1950   Date of Visit: 8/13/2019       Dear Dr. Spencer Elmore:    Thank you for referring Aaron Ross to me for evaluation. Attached you will find relevant portions of my assessment and plan of care.    If you have questions, please do not hesitate to call me. I look forward to following Aaron Ross along with you.    Sincerely,    Matt Winchester MD    Enclosure  CC:  No Recipients    If you would like to receive this communication electronically, please contact [email or web address] or [phone] to request more information on [employer] [facility] access.    For providers and/or their staff who would like to refer a patient to [facility], please contact us through our one-stop-shop provider referral line, [facility] [place], at [phone].    If you feel you have received this communication in error or would no longer like to receive these types of communications, please e-mail [email or web address]

## 2019-08-13 NOTE — PROGRESS NOTES
Subjective:       Patient ID: Aaron Ross is a 69 y.o. male.    Chief Complaint: Consult (Hemorrhoids)    HPI   Pleasant 68 yo M referred to me in consultation from Dr Elmore for evaluation of hemorrhoids. He notes that he has difficulty with his hemorrhoids frequently. Notes difficulty with hygiene and occasional smearing or residue in his underpants.  Notes irritation but no juan pain.  He has had some bleeding with Bms.  NOtes that bleeding has improved since his cscope a few months ago.  He denies changes in bowel habits or in activity.  Pt has noted some SOB with activity lately which he is curious if that is related to his hemorrhoids.  Pt suffers with HTN, CAD, and cognitive impairment.  No significant PShx.    Review of Systems   Constitutional: Negative for activity change, appetite change, diaphoresis, fever and unexpected weight change.   Respiratory: Negative for cough, chest tightness and wheezing.    Cardiovascular: Negative for chest pain and palpitations.   Gastrointestinal: Positive for anal bleeding and rectal pain. Negative for abdominal distention, abdominal pain, blood in stool, constipation, diarrhea, nausea and vomiting.   Genitourinary: Negative for difficulty urinating, dysuria and hematuria.   Musculoskeletal: Negative for back pain and gait problem.   Skin: Negative for color change and wound.   Neurological: Negative for tremors and seizures.   Hematological: Negative for adenopathy. Does not bruise/bleed easily.   Psychiatric/Behavioral: Negative for agitation and decreased concentration.       Objective:      Physical Exam   Constitutional: He is oriented to person, place, and time. He appears well-developed and well-nourished.   HENT:   Head: Normocephalic and atraumatic.   Eyes: Pupils are equal, round, and reactive to light. Left eye exhibits no discharge. No scleral icterus.   Neck: Normal range of motion. No tracheal deviation present. No thyromegaly present.    Cardiovascular: Normal rate, regular rhythm and normal heart sounds. Exam reveals no gallop and no friction rub.   No murmur heard.  Pulmonary/Chest: Effort normal and breath sounds normal. He has no wheezes. He exhibits no tenderness.   Abdominal: He exhibits no distension and no mass. There is no tenderness. There is no rebound and no guarding. No hernia.   Genitourinary:   Genitourinary Comments: Ext exam demonstrates nonthrombosed ext hemorrhoids in the L lateral and R pos position particularly.  DAVID with normal sphincter tone.  Anoscopy demonstrates large int hemorrhoids in R ant, R post and L lateral columns   Musculoskeletal: Normal range of motion. He exhibits no edema, tenderness or deformity.   Lymphadenopathy:     He has no cervical adenopathy.   Neurological: He is alert and oriented to person, place, and time. Coordination normal.   Skin: Skin is warm. No rash noted. No erythema. No pallor.   Psychiatric: He has a normal mood and affect. His behavior is normal.   Vitals reviewed.      Assessment:     Hemorrhoids.  No diagnosis found.    Plan:       D/w pt.  I have recommended increasing fiber in male diet and to also begin using a fiber supplement (metamucil or psyillium husk).  Will monitor for improvement and if no significant improvement will consider more aggressive treatment.  REcommended f/u with his pcp regarding SOB

## 2020-01-30 DIAGNOSIS — N40.0 BENIGN ENLARGEMENT OF PROSTATE: Primary | ICD-10-CM

## 2020-02-05 ENCOUNTER — HOSPITAL ENCOUNTER (OUTPATIENT)
Dept: RADIOLOGY | Facility: HOSPITAL | Age: 70
Discharge: HOME | End: 2020-02-05
Attending: FAMILY MEDICINE
Payer: MEDICARE

## 2020-02-05 DIAGNOSIS — N40.0 BENIGN ENLARGEMENT OF PROSTATE: ICD-10-CM

## 2020-02-05 PROCEDURE — 76857 US EXAM PELVIC LIMITED: CPT | Mod: TC,PO

## 2020-06-11 ENCOUNTER — HOSPITAL ENCOUNTER (OUTPATIENT)
Dept: RADIOLOGY | Facility: HOSPITAL | Age: 70
Discharge: HOME | End: 2020-06-11
Attending: FAMILY MEDICINE
Payer: MEDICARE

## 2020-06-11 DIAGNOSIS — R06.00 DYSPNEA: ICD-10-CM

## 2020-06-11 DIAGNOSIS — R06.00 DYSPNEA: Primary | ICD-10-CM

## 2020-06-11 PROCEDURE — 71046 X-RAY EXAM CHEST 2 VIEWS: CPT | Mod: TC

## 2020-07-31 ENCOUNTER — CLINICAL SUPPORT (OUTPATIENT)
Dept: CARDIOLOGY | Facility: HOSPITAL | Age: 70
End: 2020-07-31
Attending: FAMILY MEDICINE
Payer: MEDICARE

## 2020-07-31 DIAGNOSIS — R10.13 DYSPEPSIA: ICD-10-CM

## 2020-07-31 DIAGNOSIS — R06.00 DYSPNEA, UNSPECIFIED TYPE: ICD-10-CM

## 2020-07-31 LAB
CV STRESS BASE HR: 58 BPM
DIASTOLIC BLOOD PRESSURE: 60 MMHG
OHS CV CPX 85 PERCENT MAX PREDICTED HEART RATE MALE: 128
OHS CV CPX ESTIMATED METS: 24
OHS CV CPX MAX PREDICTED HEART RATE: 150
OHS CV CPX PATIENT IS FEMALE: 0
OHS CV CPX PATIENT IS MALE: 1
OHS CV CPX PEAK DIASTOLIC BLOOD PRESSURE: 49 MMHG
OHS CV CPX PEAK HEAR RATE: 143 BPM
OHS CV CPX PEAK RATE PRESSURE PRODUCT: NORMAL
OHS CV CPX PEAK SYSTOLIC BLOOD PRESSURE: 148 MMHG
OHS CV CPX PERCENT MAX PREDICTED HEART RATE ACHIEVED: 95
OHS CV CPX RATE PRESSURE PRODUCT PRESENTING: 8004
STRESS ECHO POST EXERCISE DUR MIN: 4 MINUTES
STRESS ECHO POST EXERCISE DUR SEC: 14 SECONDS
SYSTOLIC BLOOD PRESSURE: 138 MMHG

## 2020-07-31 PROCEDURE — 93351 STRESS TTE COMPLETE: CPT

## 2020-12-01 ENCOUNTER — TELEPHONE (OUTPATIENT)
Dept: FAMILY MEDICINE | Facility: CLINIC | Age: 70
End: 2020-12-01

## 2020-12-01 DIAGNOSIS — Z79.82 ASPIRIN LONG-TERM USE: ICD-10-CM

## 2020-12-01 DIAGNOSIS — I10 HYPERTENSION, ESSENTIAL: Primary | ICD-10-CM

## 2020-12-01 DIAGNOSIS — E78.5 HYPERLIPIDEMIA, UNSPECIFIED HYPERLIPIDEMIA TYPE: ICD-10-CM

## 2020-12-01 NOTE — TELEPHONE ENCOUNTER
----- Message from Rudy Teran sent at 12/1/2020  2:39 PM CST -----  Contact: Self  Type:  RX Refill Request    Who Called:  Patient  Refill or New Rx:   Refill  RX Name and Strength:  pravastatin (PRAVACHOL) 20 MG tablet  irbesartan (AVAPRO) 150 MG tablet  tamsulosin (FLOMAX) 0.4 mg Cap  donepeziL (ARICEPT) 10 MG tablet   ketoconazole (NIZORAL) 2 % shampoo     Is this a 30 day or 90 day RX:  90  Preferred Pharmacy with phone number:        [facility] - [address] [address]  Phone: [phone] Fax: [phone]      Local or Mail Order:  mail  Ordering Provider:  Xochilt Carlson Call Back Number:  [phone] (home)     Additional Information:       [address]

## 2020-12-03 ENCOUNTER — LAB VISIT (OUTPATIENT)
Dept: LAB | Facility: HOSPITAL | Age: 70
End: 2020-12-03
Attending: FAMILY MEDICINE
Payer: MEDICARE

## 2020-12-03 DIAGNOSIS — I10 HYPERTENSION, ESSENTIAL: ICD-10-CM

## 2020-12-03 DIAGNOSIS — E78.5 HYPERLIPIDEMIA, UNSPECIFIED HYPERLIPIDEMIA TYPE: ICD-10-CM

## 2020-12-03 DIAGNOSIS — Z79.82 ASPIRIN LONG-TERM USE: ICD-10-CM

## 2020-12-03 LAB
ALBUMIN SERPL BCP-MCNC: 3.9 G/DL (ref 3.5–5.2)
ALP SERPL-CCNC: 79 U/L (ref 55–135)
ALT SERPL W/O P-5'-P-CCNC: 22 U/L (ref 10–44)
ANION GAP SERPL CALC-SCNC: 10 MMOL/L (ref 8–16)
AST SERPL-CCNC: 22 U/L (ref 10–40)
BASOPHILS # BLD AUTO: 0.03 K/UL (ref 0–0.2)
BASOPHILS NFR BLD: 0.6 % (ref 0–1.9)
BILIRUB SERPL-MCNC: 1.1 MG/DL (ref 0.1–1)
BUN SERPL-MCNC: 13 MG/DL (ref 8–23)
CALCIUM SERPL-MCNC: 9.2 MG/DL (ref 8.7–10.5)
CHLORIDE SERPL-SCNC: 105 MMOL/L (ref 95–110)
CHOLEST SERPL-MCNC: 117 MG/DL (ref 120–199)
CHOLEST/HDLC SERPL: 3.4 {RATIO} (ref 2–5)
CO2 SERPL-SCNC: 25 MMOL/L (ref 23–29)
CREAT SERPL-MCNC: 0.9 MG/DL (ref 0.5–1.4)
DIFFERENTIAL METHOD: ABNORMAL
EOSINOPHIL # BLD AUTO: 0.1 K/UL (ref 0–0.5)
EOSINOPHIL NFR BLD: 2.2 % (ref 0–8)
ERYTHROCYTE [DISTWIDTH] IN BLOOD BY AUTOMATED COUNT: 12.9 % (ref 11.5–14.5)
EST. GFR  (AFRICAN AMERICAN): >60 ML/MIN/1.73 M^2
EST. GFR  (NON AFRICAN AMERICAN): >60 ML/MIN/1.73 M^2
GLUCOSE SERPL-MCNC: 100 MG/DL (ref 70–110)
HCT VFR BLD AUTO: 41.2 % (ref 40–54)
HDLC SERPL-MCNC: 34 MG/DL (ref 40–75)
HDLC SERPL: 29.1 % (ref 20–50)
HGB BLD-MCNC: 13.6 G/DL (ref 14–18)
IMM GRANULOCYTES # BLD AUTO: 0.02 K/UL (ref 0–0.04)
IMM GRANULOCYTES NFR BLD AUTO: 0.4 % (ref 0–0.5)
LDLC SERPL CALC-MCNC: 65.6 MG/DL (ref 63–159)
LYMPHOCYTES # BLD AUTO: 1.5 K/UL (ref 1–4.8)
LYMPHOCYTES NFR BLD: 29 % (ref 18–48)
MCH RBC QN AUTO: 31.2 PG (ref 27–31)
MCHC RBC AUTO-ENTMCNC: 33 G/DL (ref 32–36)
MCV RBC AUTO: 95 FL (ref 82–98)
MONOCYTES # BLD AUTO: 0.7 K/UL (ref 0.3–1)
MONOCYTES NFR BLD: 12.7 % (ref 4–15)
NEUTROPHILS # BLD AUTO: 2.8 K/UL (ref 1.8–7.7)
NEUTROPHILS NFR BLD: 55.1 % (ref 38–73)
NONHDLC SERPL-MCNC: 83 MG/DL
NRBC BLD-RTO: 0 /100 WBC
PLATELET # BLD AUTO: 151 K/UL (ref 150–350)
PMV BLD AUTO: 10.6 FL (ref 9.2–12.9)
POTASSIUM SERPL-SCNC: 4.2 MMOL/L (ref 3.5–5.1)
PROT SERPL-MCNC: 6.5 G/DL (ref 6–8.4)
RBC # BLD AUTO: 4.36 M/UL (ref 4.6–6.2)
SODIUM SERPL-SCNC: 140 MMOL/L (ref 136–145)
TRIGL SERPL-MCNC: 87 MG/DL (ref 30–150)
WBC # BLD AUTO: 5.11 K/UL (ref 3.9–12.7)

## 2020-12-03 PROCEDURE — 85025 COMPLETE CBC W/AUTO DIFF WBC: CPT

## 2020-12-03 PROCEDURE — 36415 COLL VENOUS BLD VENIPUNCTURE: CPT

## 2020-12-03 PROCEDURE — 80053 COMPREHEN METABOLIC PANEL: CPT

## 2020-12-03 PROCEDURE — 80061 LIPID PANEL: CPT

## 2020-12-08 ENCOUNTER — OFFICE VISIT (OUTPATIENT)
Dept: FAMILY MEDICINE | Facility: CLINIC | Age: 70
End: 2020-12-08
Payer: MEDICARE

## 2020-12-08 VITALS
OXYGEN SATURATION: 95 % | WEIGHT: 261 LBS | TEMPERATURE: 97 F | BODY MASS INDEX: 34.43 KG/M2 | HEART RATE: 64 BPM | SYSTOLIC BLOOD PRESSURE: 127 MMHG | DIASTOLIC BLOOD PRESSURE: 65 MMHG

## 2020-12-08 DIAGNOSIS — R41.89 COGNITIVE IMPAIRMENT: ICD-10-CM

## 2020-12-08 DIAGNOSIS — D64.9 ANEMIA, UNSPECIFIED TYPE: ICD-10-CM

## 2020-12-08 DIAGNOSIS — E78.5 HYPERLIPIDEMIA, UNSPECIFIED HYPERLIPIDEMIA TYPE: ICD-10-CM

## 2020-12-08 DIAGNOSIS — N40.0 BENIGN PROSTATIC HYPERPLASIA, UNSPECIFIED WHETHER LOWER URINARY TRACT SYMPTOMS PRESENT: ICD-10-CM

## 2020-12-08 DIAGNOSIS — E53.8 B12 DEFICIENCY: ICD-10-CM

## 2020-12-08 DIAGNOSIS — G47.33 OSA ON CPAP: ICD-10-CM

## 2020-12-08 DIAGNOSIS — Z79.82 ASPIRIN LONG-TERM USE: ICD-10-CM

## 2020-12-08 DIAGNOSIS — I10 HYPERTENSION, ESSENTIAL: Primary | ICD-10-CM

## 2020-12-08 PROCEDURE — 90750 ZOSTER RECOMBINANT VACCINE: ICD-10-PCS | Mod: S$GLB,,, | Performed by: FAMILY MEDICINE

## 2020-12-08 PROCEDURE — 99214 PR OFFICE/OUTPT VISIT, EST, LEVL IV, 30-39 MIN: ICD-10-PCS | Mod: 25,S$GLB,, | Performed by: FAMILY MEDICINE

## 2020-12-08 PROCEDURE — 99214 OFFICE O/P EST MOD 30 MIN: CPT | Mod: 25,S$GLB,, | Performed by: FAMILY MEDICINE

## 2020-12-08 PROCEDURE — 90750 HZV VACC RECOMBINANT IM: CPT | Mod: S$GLB,,, | Performed by: FAMILY MEDICINE

## 2020-12-08 PROCEDURE — 90471 IMMUNIZATION ADMIN: CPT | Mod: S$GLB,,, | Performed by: FAMILY MEDICINE

## 2020-12-08 PROCEDURE — 90471 ZOSTER RECOMBINANT VACCINE: ICD-10-PCS | Mod: S$GLB,,, | Performed by: FAMILY MEDICINE

## 2020-12-08 RX ORDER — PRAVASTATIN SODIUM 20 MG/1
20 TABLET ORAL DAILY
Qty: 90 TABLET | Refills: 3 | Status: SHIPPED | OUTPATIENT
Start: 2020-12-08 | End: 2021-10-28

## 2020-12-08 RX ORDER — IRBESARTAN 150 MG/1
TABLET ORAL
Qty: 90 TABLET | Refills: 3 | Status: SHIPPED | OUTPATIENT
Start: 2020-12-08 | End: 2021-10-11 | Stop reason: SDUPTHER

## 2020-12-08 RX ORDER — TAMSULOSIN HYDROCHLORIDE 0.4 MG/1
1 CAPSULE ORAL DAILY
Qty: 90 CAPSULE | Refills: 3 | Status: SHIPPED | OUTPATIENT
Start: 2020-12-08 | End: 2021-10-28

## 2020-12-08 RX ORDER — DONEPEZIL HYDROCHLORIDE 10 MG/1
10 TABLET, FILM COATED ORAL DAILY
Qty: 90 TABLET | Refills: 3 | Status: SHIPPED | OUTPATIENT
Start: 2020-12-08 | End: 2021-10-28

## 2020-12-08 RX ORDER — KETOCONAZOLE 20 MG/ML
1 SHAMPOO, SUSPENSION TOPICAL EVERY OTHER DAY
Qty: 120 ML | Refills: 3 | Status: SHIPPED | OUTPATIENT
Start: 2020-12-08 | End: 2022-07-20 | Stop reason: SDUPTHER

## 2020-12-08 RX ORDER — A/SINGAPORE/GP1908/2015 IVR-180 (AN A/MICHIGAN/45/2015 (H1N1)PDM09-LIKE VIRUS, A/HONG KONG/4801/2014, NYMC X-263B (H3N2) (AN A/HONG KONG/4801/2014-LIKE VIRUS), AND B/BRISBANE/60/2008, WILD TYPE (A B/BRISBANE/60/2008-LIKE VIRUS) 15; 15; 15 UG/.5ML; UG/.5ML; UG/.5ML
INJECTION, SUSPENSION INTRAMUSCULAR
COMMUNITY
Start: 2020-09-28 | End: 2022-08-24

## 2020-12-08 NOTE — PROGRESS NOTES
Subjective:       Patient ID: Aaron Ross is a 70 y.o. male.    Chief Complaint: Medication Refill    Here today for routine follow up and medication refill. Overall he is feeling well. Hypertension, blood pressure has been doing well. Cardiovascular ok no chest pain. Taking ASA 81 mg daily. Weight has decreased from 274 to 261 lbs since April. Memory has been doing ok. SIMON still using his CPAP. Labs done prior to visit. CMP normal. CBC shows slight anemia. He does report some hemorrhoids with some bleeding. His colonoscopy is up to date by Dr. Elmore. TSH was normal. He already received his flu shot at [facility] in [place]. He would like the new shingles vaccine.    Memory has been doing well.  Declines diabetic foot exam.  Hemoglobin noted to be slightly low at 13.6.  MCV is normal.  No bleeding.  Colonoscopy is current.  Review of Systems   Constitutional: Negative.  Negative for appetite change, chills, fatigue and fever.   HENT: Negative.  Negative for ear pain, rhinorrhea, sinus pressure/congestion and sore throat.    Respiratory: Negative.  Negative for cough, chest tightness, shortness of breath and wheezing.    Cardiovascular: Negative.  Negative for chest pain, palpitations and leg swelling.   Gastrointestinal: Negative.  Negative for abdominal pain, diarrhea, nausea and vomiting.   Genitourinary: Negative.  Negative for difficulty urinating, dysuria, frequency and urgency.   Musculoskeletal: Negative.  Negative for arthralgias, back pain, myalgias and neck pain.   Integumentary:  Negative.   Neurological: Negative.  Negative for dizziness, weakness, numbness and headaches.   All other systems reviewed and are negative.        Objective:      Physical Exam  Vitals signs reviewed.   Constitutional:       General: He is not in acute distress.     Appearance: Normal appearance.   HENT:      Head: Normocephalic and atraumatic.      Right Ear: External ear normal.      Left Ear: External ear  normal.      Nose: Nose normal.      Mouth/Throat:      Mouth: Mucous membranes are moist.   Eyes:      Pupils: Pupils are equal, round, and reactive to light.   Neck:      Musculoskeletal: Normal range of motion and neck supple.      Vascular: No carotid bruit.   Cardiovascular:      Rate and Rhythm: Normal rate and regular rhythm.      Pulses: Normal pulses.      Heart sounds: Normal heart sounds. No murmur. No friction rub. No gallop.    Pulmonary:      Effort: Pulmonary effort is normal.      Breath sounds: Normal breath sounds. No wheezing, rhonchi or rales.   Abdominal:      Palpations: Abdomen is soft.      Tenderness: There is no abdominal tenderness.   Musculoskeletal: Normal range of motion.      Right lower leg: No edema.      Left lower leg: No edema.   Skin:     General: Skin is warm and dry.      Capillary Refill: Capillary refill takes less than 2 seconds.   Neurological:      General: No focal deficit present.      Mental Status: He is alert and oriented to person, place, and time.   Psychiatric:         Mood and Affect: Mood normal.         Behavior: Behavior normal.         Assessment:       1. Hypertension, essential    2. Aspirin long-term use    3. Hyperlipidemia, unspecified hyperlipidemia type    4. B12 deficiency    5. SIMON on CPAP    6. Cognitive impairment    7. Benign prostatic hyperplasia, unspecified whether lower urinary tract symptoms present    8. Anemia, unspecified type        Plan:       **Document flu shot. Ferritin, vitamin b12, and folate ordered to evaluate anemia.  Continue his other medications.  Refilled all medicines.. Shingrix #1 today, and #2 in 2-6 months.*

## 2020-12-09 PROBLEM — D64.9 ANEMIA: Status: ACTIVE | Noted: 2020-12-09

## 2021-01-05 ENCOUNTER — TELEPHONE (OUTPATIENT)
Dept: FAMILY MEDICINE | Facility: CLINIC | Age: 71
End: 2021-01-05

## 2021-01-07 ENCOUNTER — OFFICE VISIT (OUTPATIENT)
Dept: FAMILY MEDICINE | Facility: CLINIC | Age: 71
End: 2021-01-07
Payer: MEDICARE

## 2021-01-07 VITALS
WEIGHT: 266 LBS | HEART RATE: 68 BPM | DIASTOLIC BLOOD PRESSURE: 78 MMHG | TEMPERATURE: 98 F | OXYGEN SATURATION: 98 % | BODY MASS INDEX: 35.25 KG/M2 | SYSTOLIC BLOOD PRESSURE: 126 MMHG | HEIGHT: 73 IN

## 2021-01-07 DIAGNOSIS — Z01.84 ENCOUNTER FOR ANTIBODY RESPONSE EXAMINATION: ICD-10-CM

## 2021-01-07 DIAGNOSIS — Z03.818 ENCOUNTER FOR OBSERVATION FOR SUSPECTED EXPOSURE TO OTHER BIOLOGICAL AGENTS RULED OUT: ICD-10-CM

## 2021-01-07 DIAGNOSIS — R05.9 COUGH: ICD-10-CM

## 2021-01-07 DIAGNOSIS — Z20.822 EXPOSURE TO COVID-19 VIRUS: Primary | ICD-10-CM

## 2021-01-07 PROCEDURE — 99213 OFFICE O/P EST LOW 20 MIN: CPT | Mod: S$GLB,,, | Performed by: FAMILY MEDICINE

## 2021-01-07 PROCEDURE — 99213 PR OFFICE/OUTPT VISIT, EST, LEVL III, 20-29 MIN: ICD-10-PCS | Mod: S$GLB,,, | Performed by: FAMILY MEDICINE

## 2021-01-10 ENCOUNTER — IMMUNIZATION (OUTPATIENT)
Dept: PRIMARY CARE CLINIC | Facility: CLINIC | Age: 71
End: 2021-01-10
Payer: MEDICARE

## 2021-01-10 DIAGNOSIS — Z23 NEED FOR VACCINATION: ICD-10-CM

## 2021-01-10 PROCEDURE — 91300 COVID-19, MRNA, LNP-S, PF, 30 MCG/0.3 ML DOSE VACCINE: CPT | Mod: S$GLB,,, | Performed by: FAMILY MEDICINE

## 2021-01-10 PROCEDURE — 0001A COVID-19, MRNA, LNP-S, PF, 30 MCG/0.3 ML DOSE VACCINE: CPT | Mod: S$GLB,,, | Performed by: FAMILY MEDICINE

## 2021-01-10 PROCEDURE — 91300 COVID-19, MRNA, LNP-S, PF, 30 MCG/0.3 ML DOSE VACCINE: ICD-10-PCS | Mod: S$GLB,,, | Performed by: FAMILY MEDICINE

## 2021-01-10 PROCEDURE — 0001A COVID-19, MRNA, LNP-S, PF, 30 MCG/0.3 ML DOSE VACCINE: ICD-10-PCS | Mod: S$GLB,,, | Performed by: FAMILY MEDICINE

## 2021-01-31 ENCOUNTER — IMMUNIZATION (OUTPATIENT)
Dept: PRIMARY CARE CLINIC | Facility: CLINIC | Age: 71
End: 2021-01-31
Payer: MEDICARE

## 2021-01-31 DIAGNOSIS — Z23 NEED FOR VACCINATION: Primary | ICD-10-CM

## 2021-01-31 PROCEDURE — 0002A COVID-19, MRNA, LNP-S, PF, 30 MCG/0.3 ML DOSE VACCINE: ICD-10-PCS | Mod: S$GLB,,, | Performed by: FAMILY MEDICINE

## 2021-01-31 PROCEDURE — 0002A COVID-19, MRNA, LNP-S, PF, 30 MCG/0.3 ML DOSE VACCINE: CPT | Mod: S$GLB,,, | Performed by: FAMILY MEDICINE

## 2021-01-31 PROCEDURE — 91300 COVID-19, MRNA, LNP-S, PF, 30 MCG/0.3 ML DOSE VACCINE: CPT | Mod: S$GLB,,, | Performed by: FAMILY MEDICINE

## 2021-01-31 PROCEDURE — 91300 COVID-19, MRNA, LNP-S, PF, 30 MCG/0.3 ML DOSE VACCINE: ICD-10-PCS | Mod: S$GLB,,, | Performed by: FAMILY MEDICINE

## 2021-09-29 ENCOUNTER — HOSPITAL ENCOUNTER (OUTPATIENT)
Dept: RADIOLOGY | Facility: HOSPITAL | Age: 71
Discharge: HOME | End: 2021-09-29
Attending: INTERNAL MEDICINE
Payer: MEDICARE

## 2021-09-29 DIAGNOSIS — R06.02 SHORTNESS OF BREATH: Primary | ICD-10-CM

## 2021-09-29 DIAGNOSIS — R06.02 SHORTNESS OF BREATH: ICD-10-CM

## 2021-09-29 PROCEDURE — 71046 X-RAY EXAM CHEST 2 VIEWS: CPT | Mod: TC,PO

## 2021-10-07 ENCOUNTER — TELEPHONE (OUTPATIENT)
Dept: FAMILY MEDICINE | Facility: CLINIC | Age: 71
End: 2021-10-07

## 2021-10-08 DIAGNOSIS — Z12.5 SCREENING FOR PROSTATE CANCER: ICD-10-CM

## 2021-10-08 DIAGNOSIS — E78.5 HYPERLIPIDEMIA, UNSPECIFIED HYPERLIPIDEMIA TYPE: Primary | ICD-10-CM

## 2021-10-08 DIAGNOSIS — I10 HYPERTENSION, ESSENTIAL: ICD-10-CM

## 2021-10-08 DIAGNOSIS — Z79.82 ASPIRIN LONG-TERM USE: ICD-10-CM

## 2021-10-11 ENCOUNTER — OFFICE VISIT (OUTPATIENT)
Dept: FAMILY MEDICINE | Facility: CLINIC | Age: 71
End: 2021-10-11
Payer: MEDICARE

## 2021-10-11 VITALS
TEMPERATURE: 98 F | SYSTOLIC BLOOD PRESSURE: 138 MMHG | WEIGHT: 264 LBS | OXYGEN SATURATION: 97 % | HEIGHT: 73 IN | HEART RATE: 53 BPM | DIASTOLIC BLOOD PRESSURE: 66 MMHG | BODY MASS INDEX: 34.99 KG/M2

## 2021-10-11 DIAGNOSIS — Z79.82 ASPIRIN LONG-TERM USE: ICD-10-CM

## 2021-10-11 DIAGNOSIS — G47.33 OSA TREATED WITH BIPAP: Primary | ICD-10-CM

## 2021-10-11 DIAGNOSIS — E78.5 HYPERLIPIDEMIA, UNSPECIFIED HYPERLIPIDEMIA TYPE: ICD-10-CM

## 2021-10-11 DIAGNOSIS — R61 EXCESSIVE SWEATING: ICD-10-CM

## 2021-10-11 DIAGNOSIS — I10 HYPERTENSION, ESSENTIAL: ICD-10-CM

## 2021-10-11 DIAGNOSIS — J30.0 VASOMOTOR RHINITIS: ICD-10-CM

## 2021-10-11 DIAGNOSIS — E53.8 B12 DEFICIENCY: ICD-10-CM

## 2021-10-11 DIAGNOSIS — Z12.5 SCREENING PSA (PROSTATE SPECIFIC ANTIGEN): ICD-10-CM

## 2021-10-11 DIAGNOSIS — Z11.59 ENCOUNTER FOR HEPATITIS C SCREENING TEST FOR LOW RISK PATIENT: ICD-10-CM

## 2021-10-11 PROCEDURE — 99214 PR OFFICE/OUTPT VISIT, EST, LEVL IV, 30-39 MIN: ICD-10-PCS | Mod: 25,S$GLB,, | Performed by: FAMILY MEDICINE

## 2021-10-11 PROCEDURE — 90694 VACC AIIV4 NO PRSRV 0.5ML IM: CPT | Mod: S$GLB,,, | Performed by: FAMILY MEDICINE

## 2021-10-11 PROCEDURE — G0008 FLU VACCINE - QUADRIVALENT - ADJUVANTED: ICD-10-PCS | Mod: S$GLB,,, | Performed by: FAMILY MEDICINE

## 2021-10-11 PROCEDURE — 90694 FLU VACCINE - QUADRIVALENT - ADJUVANTED: ICD-10-PCS | Mod: S$GLB,,, | Performed by: FAMILY MEDICINE

## 2021-10-11 PROCEDURE — G0008 ADMIN INFLUENZA VIRUS VAC: HCPCS | Mod: S$GLB,,, | Performed by: FAMILY MEDICINE

## 2021-10-11 PROCEDURE — 99214 OFFICE O/P EST MOD 30 MIN: CPT | Mod: 25,S$GLB,, | Performed by: FAMILY MEDICINE

## 2021-10-11 RX ORDER — AZELASTINE 1 MG/ML
1 SPRAY, METERED NASAL 2 TIMES DAILY
Qty: 30 ML | Refills: 2 | Status: SHIPPED | OUTPATIENT
Start: 2021-10-11 | End: 2022-08-24

## 2021-10-11 RX ORDER — IRBESARTAN 75 MG/1
75 TABLET ORAL DAILY
Qty: 90 TABLET | Refills: 1 | Status: SHIPPED | OUTPATIENT
Start: 2021-10-11 | End: 2022-03-22

## 2021-10-12 LAB
ALBUMIN SERPL-MCNC: 4 G/DL (ref 3.6–5.1)
ALBUMIN/GLOB SERPL: 1.7 (CALC) (ref 1–2.5)
ALP SERPL-CCNC: 97 U/L (ref 35–144)
ALT SERPL-CCNC: 16 U/L (ref 9–46)
AST SERPL-CCNC: 17 U/L (ref 10–35)
BASOPHILS # BLD AUTO: 41 CELLS/UL (ref 0–200)
BASOPHILS NFR BLD AUTO: 0.7 %
BILIRUB SERPL-MCNC: 0.8 MG/DL (ref 0.2–1.2)
BUN SERPL-MCNC: 14 MG/DL (ref 7–25)
BUN/CREAT SERPL: NORMAL (CALC) (ref 6–22)
CALCIUM SERPL-MCNC: 9.3 MG/DL (ref 8.6–10.3)
CHLORIDE SERPL-SCNC: 103 MMOL/L (ref 98–110)
CHOLEST SERPL-MCNC: 127 MG/DL
CHOLEST/HDLC SERPL: 3.3 (CALC)
CO2 SERPL-SCNC: 29 MMOL/L (ref 20–32)
CREAT SERPL-MCNC: 1.01 MG/DL (ref 0.7–1.18)
EOSINOPHIL # BLD AUTO: 159 CELLS/UL (ref 15–500)
EOSINOPHIL NFR BLD AUTO: 2.7 %
ERYTHROCYTE [DISTWIDTH] IN BLOOD BY AUTOMATED COUNT: 12.4 % (ref 11–15)
GLOBULIN SER CALC-MCNC: 2.4 G/DL (CALC) (ref 1.9–3.7)
GLUCOSE SERPL-MCNC: 86 MG/DL (ref 65–99)
HCT VFR BLD AUTO: 41.4 % (ref 38.5–50)
HCV AB S/CO SERPL IA: 0.02
HCV AB SERPL QL IA: NORMAL
HDLC SERPL-MCNC: 39 MG/DL
HGB BLD-MCNC: 14.3 G/DL (ref 13.2–17.1)
LDLC SERPL CALC-MCNC: 67 MG/DL (CALC)
LYMPHOCYTES # BLD AUTO: 1811 CELLS/UL (ref 850–3900)
LYMPHOCYTES NFR BLD AUTO: 30.7 %
MCH RBC QN AUTO: 32.2 PG (ref 27–33)
MCHC RBC AUTO-ENTMCNC: 34.5 G/DL (ref 32–36)
MCV RBC AUTO: 93.2 FL (ref 80–100)
MONOCYTES # BLD AUTO: 690 CELLS/UL (ref 200–950)
MONOCYTES NFR BLD AUTO: 11.7 %
NEUTROPHILS # BLD AUTO: 3198 CELLS/UL (ref 1500–7800)
NEUTROPHILS NFR BLD AUTO: 54.2 %
NONHDLC SERPL-MCNC: 88 MG/DL (CALC)
PLATELET # BLD AUTO: 161 THOUSAND/UL (ref 140–400)
PMV BLD REES-ECKER: 10.8 FL (ref 7.5–12.5)
POTASSIUM SERPL-SCNC: 4.3 MMOL/L (ref 3.5–5.3)
PROT SERPL-MCNC: 6.4 G/DL (ref 6.1–8.1)
PSA SERPL-MCNC: 1.1 NG/ML
RBC # BLD AUTO: 4.44 MILLION/UL (ref 4.2–5.8)
SODIUM SERPL-SCNC: 140 MMOL/L (ref 135–146)
TRIGL SERPL-MCNC: 126 MG/DL
TSH SERPL-ACNC: 2.51 MIU/L (ref 0.4–4.5)
VIT B12 SERPL-MCNC: >2000 PG/ML (ref 200–1100)
WBC # BLD AUTO: 5.9 THOUSAND/UL (ref 3.8–10.8)

## 2022-04-22 ENCOUNTER — PATIENT MESSAGE (OUTPATIENT)
Dept: FAMILY MEDICINE | Facility: CLINIC | Age: 72
End: 2022-04-22
Payer: MEDICARE

## 2022-07-27 ENCOUNTER — PATIENT MESSAGE (OUTPATIENT)
Dept: FAMILY MEDICINE | Facility: CLINIC | Age: 72
End: 2022-07-27
Payer: MEDICARE

## 2022-07-27 ENCOUNTER — TELEPHONE (OUTPATIENT)
Dept: FAMILY MEDICINE | Facility: CLINIC | Age: 72
End: 2022-07-27
Payer: MEDICARE

## 2022-07-27 NOTE — TELEPHONE ENCOUNTER
Pt returned call  AWV appt set up for 8/24/2022 @3:30pm w/Ms Mykel at [facility]

## 2022-08-24 ENCOUNTER — OFFICE VISIT (OUTPATIENT)
Dept: FAMILY MEDICINE | Facility: CLINIC | Age: 72
End: 2022-08-24
Payer: MEDICARE

## 2022-08-24 VITALS
DIASTOLIC BLOOD PRESSURE: 68 MMHG | TEMPERATURE: 98 F | OXYGEN SATURATION: 95 % | RESPIRATION RATE: 16 BRPM | HEIGHT: 73 IN | SYSTOLIC BLOOD PRESSURE: 128 MMHG | WEIGHT: 274.56 LBS | BODY MASS INDEX: 36.39 KG/M2 | HEART RATE: 59 BPM

## 2022-08-24 DIAGNOSIS — Z96.652 STATUS POST TOTAL LEFT KNEE REPLACEMENT: ICD-10-CM

## 2022-08-24 DIAGNOSIS — Z74.09 OTHER REDUCED MOBILITY: ICD-10-CM

## 2022-08-24 DIAGNOSIS — I25.10 ARTERIOSCLEROTIC CARDIOVASCULAR DISEASE: ICD-10-CM

## 2022-08-24 DIAGNOSIS — Z00.00 ENCOUNTER FOR PREVENTIVE HEALTH EXAMINATION: Primary | ICD-10-CM

## 2022-08-24 DIAGNOSIS — I10 HYPERTENSION, ESSENTIAL: ICD-10-CM

## 2022-08-24 DIAGNOSIS — E66.01 SEVERE OBESITY (BMI 35.0-35.9 WITH COMORBIDITY): ICD-10-CM

## 2022-08-24 DIAGNOSIS — E78.5 HYPERLIPIDEMIA, UNSPECIFIED HYPERLIPIDEMIA TYPE: ICD-10-CM

## 2022-08-24 DIAGNOSIS — G47.33 OSA TREATED WITH BIPAP: ICD-10-CM

## 2022-08-24 PROBLEM — R41.89 IMPAIRED COGNITION: Status: ACTIVE | Noted: 2017-11-30

## 2022-08-24 PROBLEM — E11.59 HYPERTENSION ASSOCIATED WITH DIABETES: Status: ACTIVE | Noted: 2018-11-16

## 2022-08-24 PROBLEM — E11.59 HYPERTENSION ASSOCIATED WITH DIABETES: Status: RESOLVED | Noted: 2018-11-16 | Resolved: 2022-08-24

## 2022-08-24 PROBLEM — I15.2 HYPERTENSION ASSOCIATED WITH DIABETES: Status: RESOLVED | Noted: 2018-11-16 | Resolved: 2022-08-24

## 2022-08-24 PROBLEM — I15.2 HYPERTENSION ASSOCIATED WITH DIABETES: Status: ACTIVE | Noted: 2018-11-16

## 2022-08-24 PROCEDURE — 99214 OFFICE O/P EST MOD 30 MIN: CPT | Mod: PBBFAC,PN | Performed by: FAMILY MEDICINE

## 2022-08-24 PROCEDURE — 99999 PR PBB SHADOW E&M-EST. PATIENT-LVL IV: ICD-10-PCS | Mod: PBBFAC,,, | Performed by: FAMILY MEDICINE

## 2022-08-24 PROCEDURE — G0439 PPPS, SUBSEQ VISIT: HCPCS | Mod: ,,, | Performed by: FAMILY MEDICINE

## 2022-08-24 PROCEDURE — G0439 PR MEDICARE ANNUAL WELLNESS SUBSEQUENT VISIT: ICD-10-PCS | Mod: ,,, | Performed by: FAMILY MEDICINE

## 2022-08-24 PROCEDURE — 99999 PR PBB SHADOW E&M-EST. PATIENT-LVL IV: CPT | Mod: PBBFAC,,, | Performed by: FAMILY MEDICINE

## 2022-08-24 NOTE — PROGRESS NOTES
"  Aaron Ross presented for a  Medicare AWV and comprehensive Health Risk Assessment today. The following components were reviewed and updated:    · Medical history  · Family History  · Social history  · Allergies and Current Medications  · Health Risk Assessment  · Health Maintenance  · Care Team         ** See Completed Assessments for Annual Wellness Visit within the encounter summary.**         The following assessments were completed:  · Living Situation  · CAGE  · Depression Screening  · Timed Get Up and Go  · Whisper Test  · Cognitive Function Screening  · Nutrition Screening  · ADL Screening  · PAQ Screening            Vitals:    08/24/22 1530   BP: 128/68   BP Location: Left arm   Patient Position: Sitting   BP Method: Medium (Manual)   Pulse: (!) 59   Resp: 16   Temp: 98.2 °F (36.8 °C)   TempSrc: Oral   SpO2: 95%   Weight: 124.6 kg (274 lb 9.3 oz)   Height: 6' 1" (1.854 m)     Body mass index is 36.23 kg/m².  Physical Exam  Vitals reviewed.   Constitutional:       Appearance: Normal appearance.   HENT:      Head: Normocephalic and atraumatic.   Eyes:      Pupils: Pupils are equal, round, and reactive to light.   Pulmonary:      Effort: Pulmonary effort is normal. No respiratory distress.   Skin:     General: Skin is warm and dry.   Neurological:      General: No focal deficit present.      Mental Status: He is alert and oriented to person, place, and time.   Psychiatric:         Mood and Affect: Mood normal.         Behavior: Behavior normal.       The ASCVD Risk score (Jhonmini GARNER Jr., et al., 2013) failed to calculate for the following reasons:    The valid total cholesterol range is 130 to 320 mg/dL        Diagnoses and health risks identified today and associated recommendations/orders:    1. Encounter for preventive health examination    2. Severe obesity (BMI 35.0-35.9 with comorbidity)  Body mass index is 36.23 kg/m².  Continue healthy diet and regular exercise as tolerated.  Continue medications " as prescribed.  Follow up with PCP, Wilfrido Lemon III, MD     3. Hypertension, essential  Stable  Continue medications as prescribed.  Follow up with PCP and cardiology, Dr Horowitz    4. Hyperlipidemia, unspecified hyperlipidemia type  Stable  Continue medications as prescribed.  Follow up with PCP and cardio    5. SIMON treated with BiPAP  Stable  Continue medications as prescribed.  Follow up with PCP and pulmonology, Dr Coronado    6. Status post total left knee replacement  Has weakness, discussed returning to orthopedics  Continue medications as prescribed.  Follow up with PCP and orthopedics, Dr Tamayo    7. Arteriosclerotic cardiovascular disease  Stable  Continue medications as prescribed.  Follow up with PCP and cardio    8. Other reduced mobility  Stable without assistive device      Provided Aaron with a 5-10 year written screening schedule and personal prevention plan. Recommendations were developed using the USPSTF age appropriate recommendations. Education, counseling, and referrals were provided as needed. After Visit Summary printed and given to patient which includes a list of additional screenings\tests needed.    Follow up if symptoms worsen or fail to improve, for scheduled appt, 1 year for AWV.    Nadine Hogue NP        Future Appointments     Date Provider Specialty Appt Notes    8/30/2022 Dulce Horowitz, [facility] Get physical to verify statin drug still safe for me: get results of Aug 24, Enhanced physical  & to re-issue prescriptions          I offered to discuss advanced care planning, including how to pick a person who would make decisions for you if you were unable to make them for yourself, called a health care power of , and what kind of decisions you might make such as use of life sustaining treatments such as ventilators and tube feeding when faced with a life limiting illness recorded on a living will that they will need to know. (How you want to be cared  for as you near the end of your natural life)     X  Patient has advanced directives written and agrees to provide copies to the institution.

## 2022-08-24 NOTE — PATIENT INSTRUCTIONS
Counseling and Referral of Other Preventative  (Italic type indicates deductible and co-insurance are waived)    Patient Name: Aaron Ross  Today's Date: 8/24/2022    Health Maintenance         Date Due Completion Date    High Dose Statin Never done ---    Shingles Vaccine (2 of 2) 02/02/2021 12/8/2020          Influenza Vaccine (1) 09/01/2022 10/11/2021    Override on 9/28/2020: Done    Lipid Panel 10/11/2026 10/11/2021    TETANUS VACCINE 06/13/2029 6/13/2019    Colorectal Cancer Screening 07/09/2029 7/9/2019          No orders of the defined types were placed in this encounter.    The following information is provided to all patients.  This information is to help you find resources for any of the problems found today that may be affecting your health:                Living healthy guide: www.[facility].[place].gov      Understanding Diabetes: www.diabetes.org      Eating healthy: www.cdc.gov/healthyweight      CDC home safety checklist: www.cdc.gov/steadi/patient.html      Agency on Aging: www.goea.[place].[facility]      Alcoholics anonymous (AA): www.aa.org      Physical Activity: www.bree.nih.gov/le1ysxs      Tobacco use: www.quitwithusla.org

## 2022-08-30 ENCOUNTER — OFFICE VISIT (OUTPATIENT)
Dept: FAMILY MEDICINE | Facility: CLINIC | Age: 72
End: 2022-08-30
Payer: MEDICARE

## 2022-08-30 VITALS
BODY MASS INDEX: 35.85 KG/M2 | HEIGHT: 73 IN | DIASTOLIC BLOOD PRESSURE: 80 MMHG | SYSTOLIC BLOOD PRESSURE: 130 MMHG | WEIGHT: 270.5 LBS | HEART RATE: 54 BPM | RESPIRATION RATE: 18 BRPM | TEMPERATURE: 97 F | OXYGEN SATURATION: 96 %

## 2022-08-30 DIAGNOSIS — E07.9 THYROID DISEASE: ICD-10-CM

## 2022-08-30 DIAGNOSIS — E53.8 B12 DEFICIENCY: ICD-10-CM

## 2022-08-30 DIAGNOSIS — I10 HYPERTENSION, ESSENTIAL: ICD-10-CM

## 2022-08-30 DIAGNOSIS — E78.2 MIXED HYPERLIPIDEMIA: ICD-10-CM

## 2022-08-30 DIAGNOSIS — B35.0 FUNGAL INFECTION OF THE SCALP: ICD-10-CM

## 2022-08-30 DIAGNOSIS — G47.33 OSA TREATED WITH BIPAP: ICD-10-CM

## 2022-08-30 DIAGNOSIS — N40.0 BENIGN PROSTATIC HYPERPLASIA WITHOUT LOWER URINARY TRACT SYMPTOMS: ICD-10-CM

## 2022-08-30 DIAGNOSIS — B07.9 VIRAL WART ON FINGER: ICD-10-CM

## 2022-08-30 DIAGNOSIS — D51.8 OTHER VITAMIN B12 DEFICIENCY ANEMIA: ICD-10-CM

## 2022-08-30 DIAGNOSIS — R41.89 IMPAIRED COGNITION: Primary | ICD-10-CM

## 2022-08-30 DIAGNOSIS — M62.81 MUSCLE WEAKNESS OF LOWER EXTREMITY: ICD-10-CM

## 2022-08-30 DIAGNOSIS — Z79.82 LONG TERM (CURRENT) USE OF ASPIRIN: ICD-10-CM

## 2022-08-30 DIAGNOSIS — I25.10 ARTERIOSCLEROTIC CARDIOVASCULAR DISEASE: ICD-10-CM

## 2022-08-30 PROBLEM — R07.9 CHEST PAIN: Status: RESOLVED | Noted: 2018-05-07 | Resolved: 2022-08-30

## 2022-08-30 PROBLEM — N41.0 ACUTE PROSTATITIS: Status: RESOLVED | Noted: 2017-11-30 | Resolved: 2022-08-30

## 2022-08-30 PROBLEM — R06.00 DYSPNEA: Status: RESOLVED | Noted: 2018-11-24 | Resolved: 2022-08-30

## 2022-08-30 PROCEDURE — 99214 PR OFFICE/OUTPT VISIT, EST, LEVL IV, 30-39 MIN: ICD-10-PCS | Mod: S$GLB,,, | Performed by: NURSE PRACTITIONER

## 2022-08-30 PROCEDURE — 99214 OFFICE O/P EST MOD 30 MIN: CPT | Mod: S$GLB,,, | Performed by: NURSE PRACTITIONER

## 2022-08-30 RX ORDER — TAMSULOSIN HYDROCHLORIDE 0.4 MG/1
1 CAPSULE ORAL DAILY
Qty: 90 CAPSULE | Refills: 3 | Status: SHIPPED | OUTPATIENT
Start: 2022-08-30 | End: 2023-06-05 | Stop reason: SDUPTHER

## 2022-08-30 RX ORDER — IRBESARTAN 75 MG/1
75 TABLET ORAL DAILY
Qty: 90 TABLET | Refills: 3 | Status: SHIPPED | OUTPATIENT
Start: 2022-08-30 | End: 2023-10-03 | Stop reason: SDUPTHER

## 2022-08-30 RX ORDER — DONEPEZIL HYDROCHLORIDE 10 MG/1
10 TABLET, FILM COATED ORAL DAILY
Qty: 90 TABLET | Refills: 3 | Status: SHIPPED | OUTPATIENT
Start: 2022-08-30 | End: 2023-06-05 | Stop reason: SDUPTHER

## 2022-08-30 RX ORDER — ASCORBIC ACID 250 MG
500 TABLET ORAL
COMMUNITY
End: 2023-01-13

## 2022-08-30 RX ORDER — KETOCONAZOLE 20 MG/ML
1 SHAMPOO, SUSPENSION TOPICAL EVERY OTHER DAY
Qty: 480 ML | Refills: 3 | Status: SHIPPED | OUTPATIENT
Start: 2022-08-30 | End: 2023-10-03 | Stop reason: SDUPTHER

## 2022-08-30 RX ORDER — ACETAMINOPHEN 500 MG
50 TABLET ORAL
Status: ON HOLD | COMMUNITY
End: 2023-01-19 | Stop reason: HOSPADM

## 2022-08-30 RX ORDER — PRAVASTATIN SODIUM 20 MG/1
20 TABLET ORAL DAILY
Qty: 90 TABLET | Refills: 3 | Status: SHIPPED | OUTPATIENT
Start: 2022-08-30 | End: 2022-11-17 | Stop reason: SDUPTHER

## 2022-08-30 NOTE — PROGRESS NOTES
SUBJECTIVE:      Patient ID: Aaron Ross is a 72 y.o. male.    Chief Complaint: Annual Exam    HPI  Is here for annual check up  Vss; bp is well controlled  Does have bradycardia but is asymptomatic  Denies chest pain  Denies sob  Denies fever  Denies chills  Has decreased cognitive function- is on aricept managing well  Has BPH is on flomax  Has htn is on avapro  Has hyperlipidemia is on pravastatin  Has b12 anemia and def; has chet; has osteoarthritis; has thyroid disease; has arteriosclerotic cardiovascular disease- mild plaque seen from 2018 myoview with EF in the 70s  Psa screen last done oct 2021; will wait for next visit to order  While last set of chol was great-  HDL 39 Triglyc 126 LDL 67; has the arteriosclerotic disease- recommend staying on pravastatin and taking a baby aspirin daily  Feels as though muscles around knees are getting weaker- does have hardware in each knee  Gets up in the AM and is hard to walk initiallly- can walk on treadmill but has issues with balance initially  Also has bilateral 1+ edema in bilateral legs but has not changed        Past Surgical History:   Procedure Laterality Date    BACK SURGERY      PAPAYA JUICE INJECTION    JOINT REPLACEMENT Right 10/01/2012    KNEE SURGERY      THYROID SURGERY      PARTIAL    UVULA      REMOVED FOR SLEEP APNEA     No family history on file.   Social History     Socioeconomic History    Marital status:    Occupational History    Occupation: RETIRED   Tobacco Use    Smoking status: Never    Smokeless tobacco: Never   Substance and Sexual Activity    Alcohol use: Yes     Comment: RARELY    Drug use: No    Sexual activity: Yes     Partners: Female     Current Outpatient Medications   Medication Sig Dispense Refill    ascorbic acid, vitamin C, (VITAMIN C) 250 MG tablet Take 500 mg by mouth.      aspirin (ECOTRIN) 81 MG EC tablet Take 81 mg by mouth once daily.      cholecalciferol, vitamin D3, (VITAMIN D3) 50 mcg (2,000  "unit) Cap capsule Take 50 mg by mouth.      psyllium (KONSYL) Powd Take 1 packet by mouth.      donepeziL (ARICEPT) 10 MG tablet Take 1 tablet (10 mg total) by mouth once daily. 90 tablet 3    irbesartan (AVAPRO) 75 MG tablet Take 1 tablet (75 mg total) by mouth once daily. 90 tablet 3    ketoconazole (NIZORAL) 2 % shampoo Apply 1 Applicatorful topically every other day. 480 mL 3    pravastatin (PRAVACHOL) 20 MG tablet Take 1 tablet (20 mg total) by mouth once daily. 90 tablet 3    tamsulosin (FLOMAX) 0.4 mg Cap Take 1 capsule (0.4 mg total) by mouth once daily. 90 capsule 3     No current facility-administered medications for this visit.     Review of patient's allergies indicates:   Allergen Reactions    Adhesive Rash     MEDICAL GLUE CAUSED A RASH    Ofloxacin Other (See Comments)     STATES CAUSES DIZZINESS.    Adhesive tape-silicones Rash     STATES WRAP IN LAB CAUSES MILD RASH.      Past Medical History:   Diagnosis Date    Arthritis     BMI 36.0-36.9,adult 6/25/2018    Sleep apnea     USES CPAP    Thyroid disease     Varicose veins of both lower extremities     Wears glasses      Past Surgical History:   Procedure Laterality Date    BACK SURGERY      PAPAYA JUICE INJECTION    JOINT REPLACEMENT Right 10/01/2012    KNEE SURGERY      THYROID SURGERY      PARTIAL    UVULA      REMOVED FOR SLEEP APNEA       Review of Systems   All other systems reviewed and are negative.   OBJECTIVE:      Vitals:    08/30/22 1054   BP: 130/80   BP Location: Left arm   Patient Position: Sitting   BP Method: Large (Manual)   Pulse: (!) 54   Resp: 18   Temp: 96.9 °F (36.1 °C)   SpO2: 96%   Weight: 122.7 kg (270 lb 8 oz)   Height: 6' 1" (1.854 m)     Physical Exam  Vitals and nursing note reviewed.   Constitutional:       Appearance: Normal appearance. He is obese.   HENT:      Head: Normocephalic and atraumatic.   Eyes:      Pupils: Pupils are equal, round, and reactive to light.   Neck:      Comments: No thrills no bruits  No " abnormal neck masses felt  Cardiovascular:      Rate and Rhythm: Regular rhythm. Bradycardia present.      Pulses: Normal pulses.      Heart sounds: Normal heart sounds.      Comments: S1 s2 nsr  1+ pitting edema noted on bilateral lower ext unchanged from before  Pulmonary:      Effort: Pulmonary effort is normal.      Breath sounds: Normal breath sounds.   Musculoskeletal:      Cervical back: Normal range of motion.      Comments: Weakness of knees when standing- but not at clinic   Skin:     General: Skin is warm and dry.      Comments: Right little finger by nail bed is hard - may be wart   Neurological:      General: No focal deficit present.      Mental Status: He is alert and oriented to person, place, and time. Mental status is at baseline.   Psychiatric:         Mood and Affect: Mood normal.         Behavior: Behavior normal.         Thought Content: Thought content normal.         Judgment: Judgment normal.      Comments: nonsuicidal      Assessment:       1. Impaired cognition    2. Hypertension, essential    3. Arteriosclerotic cardiovascular disease    4. Mixed hyperlipidemia    5. Benign prostatic hyperplasia without lower urinary tract symptoms    6. Other vitamin B12 deficiency anemia    7. B12 deficiency    8. Thyroid disease    9. SIMON treated with BiPAP    10. Long term (current) use of aspirin    11. Viral wart on finger    12. Fungal infection of the scalp    13. Muscle weakness of lower extremity        Plan:       Impaired cognition  -     donepeziL (ARICEPT) 10 MG tablet; Take 1 tablet (10 mg total) by mouth once daily.  Dispense: 90 tablet; Refill: 3    Hypertension, essential  -     Comprehensive Metabolic Panel; Future; Expected date: 08/30/2022  -     irbesartan (AVAPRO) 75 MG tablet; Take 1 tablet (75 mg total) by mouth once daily.  Dispense: 90 tablet; Refill: 3    Arteriosclerotic cardiovascular disease  -     Comprehensive Metabolic Panel; Future; Expected date: 08/30/2022  -     Lipid  Panel; Future; Expected date: 08/30/2022  -     pravastatin (PRAVACHOL) 20 MG tablet; Take 1 tablet (20 mg total) by mouth once daily.  Dispense: 90 tablet; Refill: 3    Mixed hyperlipidemia  -     Lipid Panel; Future; Expected date: 08/30/2022  -     pravastatin (PRAVACHOL) 20 MG tablet; Take 1 tablet (20 mg total) by mouth once daily.  Dispense: 90 tablet; Refill: 3    Benign prostatic hyperplasia without lower urinary tract symptoms  -     tamsulosin (FLOMAX) 0.4 mg Cap; Take 1 capsule (0.4 mg total) by mouth once daily.  Dispense: 90 capsule; Refill: 3    Other vitamin B12 deficiency anemia  -     Vitamin B12; Future; Expected date: 08/30/2022    B12 deficiency  -     Vitamin B12; Future; Expected date: 08/30/2022    Thyroid disease  -     TSH; Future; Expected date: 08/30/2022    SIMON treated with BiPAP    Long term (current) use of aspirin  -     CBC Auto Differential; Future; Expected date: 08/30/2022    Viral wart on finger  -     Ambulatory referral/consult to Dermatology; Future; Expected date: 08/30/2022    Fungal infection of the scalp  -     ketoconazole (NIZORAL) 2 % shampoo; Apply 1 Applicatorful topically every other day.  Dispense: 480 mL; Refill: 3    Muscle weakness of lower extremity  -     Ambulatory referral/consult to Physical/Occupational Therapy; Future; Expected date: 08/30/2022    Take medications as ordered  Take baby aspirin daily  [facility]  Consider shingles vaccine at pharmacy  Follow up in 6 months or sooner if needed  [facility] in [place] or [place] if not on insurance call back of insurance for PT and let us know who to refer to and we will put in  Use compound wart bandaid and see derm- dr tarango for little finger wart    No follow-ups on file.      8/30/2022 KAYLAH Nolasco, FNP

## 2022-08-31 LAB
ALBUMIN SERPL-MCNC: 4 G/DL (ref 3.6–5.1)
ALBUMIN/GLOB SERPL: 1.5 (CALC) (ref 1–2.5)
ALP SERPL-CCNC: 81 U/L (ref 35–144)
ALT SERPL-CCNC: 17 U/L (ref 9–46)
AST SERPL-CCNC: 20 U/L (ref 10–35)
BASOPHILS # BLD AUTO: 23 CELLS/UL (ref 0–200)
BASOPHILS NFR BLD AUTO: 0.4 %
BILIRUB SERPL-MCNC: 0.8 MG/DL (ref 0.2–1.2)
BUN SERPL-MCNC: 15 MG/DL (ref 7–25)
BUN/CREAT SERPL: NORMAL (CALC) (ref 6–22)
CALCIUM SERPL-MCNC: 9.4 MG/DL (ref 8.6–10.3)
CHLORIDE SERPL-SCNC: 104 MMOL/L (ref 98–110)
CHOLEST SERPL-MCNC: 123 MG/DL
CHOLEST/HDLC SERPL: 3.5 (CALC)
CO2 SERPL-SCNC: 29 MMOL/L (ref 20–32)
CREAT SERPL-MCNC: 1.03 MG/DL (ref 0.7–1.28)
EGFR: 77 ML/MIN/1.73M2
EOSINOPHIL # BLD AUTO: 131 CELLS/UL (ref 15–500)
EOSINOPHIL NFR BLD AUTO: 2.3 %
ERYTHROCYTE [DISTWIDTH] IN BLOOD BY AUTOMATED COUNT: 12.4 % (ref 11–15)
GLOBULIN SER CALC-MCNC: 2.6 G/DL (CALC) (ref 1.9–3.7)
GLUCOSE SERPL-MCNC: 93 MG/DL (ref 65–99)
HCT VFR BLD AUTO: 42.6 % (ref 38.5–50)
HDLC SERPL-MCNC: 35 MG/DL
HGB BLD-MCNC: 14.5 G/DL (ref 13.2–17.1)
LDLC SERPL CALC-MCNC: 67 MG/DL (CALC)
LYMPHOCYTES # BLD AUTO: 1619 CELLS/UL (ref 850–3900)
LYMPHOCYTES NFR BLD AUTO: 28.4 %
MCH RBC QN AUTO: 31.2 PG (ref 27–33)
MCHC RBC AUTO-ENTMCNC: 34 G/DL (ref 32–36)
MCV RBC AUTO: 91.6 FL (ref 80–100)
MONOCYTES # BLD AUTO: 633 CELLS/UL (ref 200–950)
MONOCYTES NFR BLD AUTO: 11.1 %
NEUTROPHILS # BLD AUTO: 3295 CELLS/UL (ref 1500–7800)
NEUTROPHILS NFR BLD AUTO: 57.8 %
NONHDLC SERPL-MCNC: 88 MG/DL (CALC)
PLATELET # BLD AUTO: 175 THOUSAND/UL (ref 140–400)
PMV BLD REES-ECKER: 10.7 FL (ref 7.5–12.5)
POTASSIUM SERPL-SCNC: 4.3 MMOL/L (ref 3.5–5.3)
PROT SERPL-MCNC: 6.6 G/DL (ref 6.1–8.1)
RBC # BLD AUTO: 4.65 MILLION/UL (ref 4.2–5.8)
SODIUM SERPL-SCNC: 139 MMOL/L (ref 135–146)
TRIGL SERPL-MCNC: 131 MG/DL
TSH SERPL-ACNC: 3.61 MIU/L (ref 0.4–4.5)
VIT B12 SERPL-MCNC: >2000 PG/ML (ref 200–1100)
WBC # BLD AUTO: 5.7 THOUSAND/UL (ref 3.8–10.8)

## 2022-09-02 ENCOUNTER — CLINICAL SUPPORT (OUTPATIENT)
Dept: REHABILITATION | Facility: HOSPITAL | Age: 72
End: 2022-09-02
Attending: NURSE PRACTITIONER
Payer: MEDICARE

## 2022-09-02 DIAGNOSIS — M62.81 MUSCLE WEAKNESS OF LOWER EXTREMITY: ICD-10-CM

## 2022-09-02 DIAGNOSIS — R26.89 BALANCE DISORDER: Primary | ICD-10-CM

## 2022-09-02 PROCEDURE — 97162 PT EVAL MOD COMPLEX 30 MIN: CPT | Mod: PN

## 2022-09-02 NOTE — PLAN OF CARE
"[facility]   Physical Therapy Initial Evaluation     Date: 9/2/2022   Name: Aaron Ross  Clinic Number: [id number]    Therapy Diagnosis:   Encounter Diagnoses   Name Primary?    Balance disorder Yes    Muscle weakness of lower extremity      Physician: Dulce Horowitz, NP    Physician Orders: PT Eval and Treat   Medical Diagnosis from Referral: Muscle weakness of LE  Evaluation Date: 9/2/2022  Authorization Period Expiration: 08/30/2023   Plan of Care Expiration: 10/28/2022  Progress Note Due: 10/2/2022  Visit # / Visits authorized: 1/ 1   FOTO: Next survey due week of 9/12/2022    Precautions: Standard     Time In: 0941  Time Out: 1030  Total Appointment Time (timed & untimed codes): 46 minutes      SUBJECTIVE     Date of onset: 3-4 months (May)    History of current condition - Francisco reports: he has noticed a progressive problem with LE weakening over the past 2-3 years.  He states he feels that this is related to decreased activity as he has spent a lot of time sitting.  Over the past 3-4 months he has had progressive difficulty with getting out of bed in the morning and walking. He feels that he is having balance problems as wells.     Falls: no but reports "a lot of close calls"    Imaging, none:     Prior Therapy: Yes for his knees following TKA, to treat rotator cuff issues, and for trigger finger  Social History:  lives with their spouse in 2 story home with 15 steps to enter but does not access 2nd floor very often.  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  Minimal difficulty getting in/out of tub.  Reports several near falls " "("bounces off walls").      Pain:  Current 1/10, worst 2/10, best 1/10   Location: bilateral feet    Description: Constant pins and needles  Aggravating Factors: water  Easing Factors: No water     Patients goals: Getting more balance and more resistance to fatigue.       Medical History:   Past Medical History:   Diagnosis Date    Arthritis     BMI 36.0-36.9,adult 6/25/2018    Sleep apnea     USES CPAP    Thyroid disease     Varicose veins of both lower extremities     Wears glasses        Surgical History:   Aaron Ross  has a past surgical history that includes Joint replacement (Right, 10/01/2012); Thyroid surgery; UVULA; Back surgery; and Knee surgery.    Medications:   Aaron has a current medication list which includes the following prescription(s): ascorbic acid (vitamin c), aspirin, cholecalciferol (vitamin d3), donepezil, irbesartan, ketoconazole, pravastatin, psyllium, and tamsulosin.    Allergies:   Review of patient's allergies indicates:   Allergen Reactions    Adhesive Rash     MEDICAL GLUE CAUSED A RASH    Ofloxacin Other (See Comments)     STATES CAUSES DIZZINESS.    Adhesive tape-silicones Rash     STATES WRAP IN LAB CAUSES MILD RASH.          OBJECTIVE     Posture: Standing: pelvis and shoulders level, minimal flexion at hips, increased thoracic kyphosis, minimal to moderate rounded shoulders, wide base of support.  Sitting Minimal reversed lumbar lordosis increased thoracic kyphosis, moderate rounded shoulder and forward head posture.   Palpation: N/A  Sensation: Reports consistent tingling in B feet (pre-existing), pre-existing numbness in L lateral hip/thigh due to "pinched nerve" in lower back   DTRs: Not applicable  Range of Motion/Strength:   Trunk flexion 70%, extension 60%, side bend 75%, rotation 50%   Core strength impaired.    LE ROM grossly WFL  Strength Grossly 4+/5     Flexibility: Hamstring length R 133*, L 127*; minimal piriformis tightness; minimal hip flexor " tightness; minimal calf tightness L > R.    Gait: Without AD  Analysis: increased step width.  Otherwise no deficits noted at walking kaleb  Bed Mobility:Independent  Transfers: Independent   Special Tests: Timed 5x sit <> stand 9.75 sec.  # of sit <>  30 sec 15.    Other:     HIGUERA  BALANCE ASSESSMENT TOOL  1. Sitting to Standing   4 - able to stand without using hands and stabilize independently  2. Standing Unsupported   4 - able to stand safely 2 minutes without hold  3. Sitting Unsupported   4 - able to sit safely and securely 2 minutes  4. Standing to Sitting   2 - uses back of legs against chair to control descent  5. Pivot Transfer   4 - able to trnasfer safely with minor use of hands  6. Standing with Eyes Closed   4 - albe to stand 10 seconds safely  7. Standing with Feet Together   3 - able to place feet together independently and stand for 1 minute with supervision  8. Reaching Forward with Outstretched Arm   3 - can reach forward 12 cm/5 inches safely  9. Retrieving Object from Floor   4 - able to  slipper safely and easily  10. Turning to Look Behind   4 - looks behind from both sides and weights shifts well  11. Turning 360 Degrees   2 - able to turn 360 safely but slowly  12. Placing Alternate Foot on Step   4 - able to stand independently safely and complete 8 steps in 20 seconds  13. Standing with One Foot in Front   0 - Looses balance while stepping or standing  14. Standing on One Foot   1 - tries to lift leg and unable to hold 3 seconds but remains standing independently    TOTAL SCORE: 43  Maximum: 56  Score:   0-20= high fall risk   21-40 moderate fall risk   41-56 low fall risk     Fall risk cut-off scores:   Elderly and History of falls: <51/56   Elderly and No history of falls: <42/56   CVA: <45/56           Limitation/Restriction for FOTO Lower Leg (w/o Knee) Survey    Therapist reviewed FOTO scores for Aaron Thorne Cody on 9/2/2022.   FOTO documents entered into EPIC -  see Media section.    Limitation Score: 42%         TREATMENT     Total Treatment time (time-based codes) separate from Evaluation: 0 minutes       No treatment initiated this date.    PATIENT EDUCATION AND HOME EXERCISES     Education provided:   - Discussed role of PT and proposed POC    Written Home Exercises Provided:  to be issued during subsequent visits . Exercises were reviewed and Francisco was able to demonstrate them prior to the end of the session.  Francisco demonstrated good  understanding of the education provided. See EMR under Patient Instructions for exercises provided during therapy sessions.    ASSESSMENT     Aaron is a 72 y.o. male referred to outpatient Physical Therapy with a medical diagnosis of Muscle weakness of LE. Patient presents with impaired posture decreased LE flexibility, decreased trunk ROM, decreased core strength, and impaired balance.  These problems contribute to impaired gait and impaired safety with standing/walking activities. He should benefit from skilled PT services to address these problems in order to reduce fall risk and functional mobility    Patient prognosis is Fair.   Patient will benefit from skilled outpatient Physical Therapy to address the deficits stated above and in the chart below, provide patient /family education, and to maximize patientt's level of independence.     Plan of care discussed with patient: Yes  Patient's spiritual, cultural and educational needs considered and patient is agreeable to the plan of care and goals as stated below:     Anticipated Barriers for therapy: Attendance (patient reports he will be out of town intermittently over the next few weeks)    Medical Necessity is demonstrated by the following  History  Co-morbidities and personal factors that may impact the plan of care Co-morbidities:   advanced age, difficulty sleeping, and high BMI    Personal Factors:   age     high   Examination  Body Structures and Functions, activity limitations  and participation restrictions that may impact the plan of care Body Regions:   lower extremities  trunk    Body Systems:    gross symmetry  ROM  strength  balance  gait  transitions    Participation Restrictions:   Attendance as he will be out of town intermittently during POC    Activity limitations:   Learning and applying knowledge  no deficits    General Tasks and Commands  no deficits    Communication  no deficits    Mobility  lifting and carrying objects  walking    Self care  no deficits    Domestic Life  no deficits    Interactions/Relationships  no deficits    Life Areas  no deficits    Community and Social Life  community life  recreation and leisure         moderate   Clinical Presentation evolving clinical presentation with changing clinical characteristics moderate   Decision Making/ Complexity Score: moderate     Goals:  Short Term Goals: 4 weeks   1) Facilitate improved LE flexibility  2) Facilitate improved posture  3) Patient will be independent in HEP for core and LE strengthening/flexility    Long Term Goals: 8 weeks   1) Improve Blackman Balance score to > 50/56  2) Improve number of sit <>  30 sec to > 20  3) Patient will consistently get out of bed and initiate gait without veering off path  4) Patient will increase standing tolerance to > 20 min without significant fatigue  5) Improve functional mobility to < 25% deficit as indicated by FOTO  Lower Leg (w/o Knee) Survey  6) Patient will be independent in complete HEP including balance retraining activities.     PLAN   Plan of care Certification: 9/2/2022 to 10/28/2022.    Outpatient Physical Therapy 2 times weekly for 8 weeks to include the following interventions: Gait Training, Neuromuscular Re-ed, Patient Education, Therapeutic Activities, and Therapeutic Exercise.     Missy Menendez, PT      I CERTIFY THE NEED FOR THESE SERVICES FURNISHED UNDER THIS PLAN OF TREATMENT AND WHILE UNDER MY CARE   Physician's comments:     Physician's  Signature: ___________________________________________________

## 2022-09-08 ENCOUNTER — CLINICAL SUPPORT (OUTPATIENT)
Dept: REHABILITATION | Facility: HOSPITAL | Age: 72
End: 2022-09-08
Attending: NURSE PRACTITIONER
Payer: MEDICARE

## 2022-09-08 DIAGNOSIS — M62.81 MUSCLE WEAKNESS OF LOWER EXTREMITY: ICD-10-CM

## 2022-09-08 DIAGNOSIS — R26.89 BALANCE DISORDER: Primary | ICD-10-CM

## 2022-09-08 PROCEDURE — 97110 THERAPEUTIC EXERCISES: CPT | Mod: PN

## 2022-09-08 PROCEDURE — 97112 NEUROMUSCULAR REEDUCATION: CPT | Mod: PN

## 2022-09-08 NOTE — PROGRESS NOTES
"[facility]   Physical Therapy Treatment Note     Name: Aaron Ross  Clinic Number: [id number]    Therapy Diagnosis:   Encounter Diagnoses   Name Primary?    Balance disorder Yes    Muscle weakness of lower extremity      Physician: Dulce Horowitz NP    Visit Date: 9/8/2022    Physician Orders: PT Eval and Treat   Medical Diagnosis from Referral: Muscle weakness of LE  Evaluation Date: 9/2/2022  Authorization Period Expiration: 08/30/2023   Plan of Care Expiration: 10/28/2022  Progress Note Due: 10/2/2022  Visit # / Visits authorized: 1/ 20 (2 total)   FOTO: Next survey due week of 9/12/2022     Precautions: Standard     PTA Visit #: 0/5     Time In: 1025  Time Out: 1108  Total Billable Time: 38 minutes    SUBJECTIVE     Pt reports: "The hardest part so far is wearing that mask".  He was not compliant with home exercise program as one has not been issued yet.  Response to previous treatment: Tolerated well  Functional change: None yet noted    Pain: 0/10  Location: N/A     OBJECTIVE     Objective Measures updated at progress report unless specified.     Treatment     Francisco received the treatments listed below:      therapeutic exercises to develop flexibility, posture, and core stabilization for 26 minutes including:  Recumbent bike L3 x 8'  Standing calf stretch using wedge 3x30s  Seated hamstring stretch 3x30s ea   Piriformis stretch (knee up) 3x30s ea  Supine hip flexor/quad stretch 3x30s ea  Bridging with resisted hip abd using blue theraband 2x10    neuromuscular re-education activities to improve: Balance for 12 minutes. The following activities were included:  Standing on airex:   Toe taps using 8" step x 2'   Static stance eyes closed 3x30s   Standing feet together eyes closes 3x30s   Single leg stance 3x30s ea      Patient Education and Home Exercises     Home Exercises Provided and Patient Education Provided     Education provided:   - Initiated instruction in exercise " program and postural correction    Written Home Exercises Provided: yes. Exercises were reviewed and Francisco was able to demonstrate them prior to the end of the session.  Francisco demonstrated good  understanding of the education provided. See EMR under Patient Instructions for exercises provided during therapy sessions    ASSESSMENT     Patient required verbal cues for postural correction during seated flexibility exercises. Demonstrated greater difficulty with single leg stance on L compared to R.       Francisco Is progressing slowlyl towards his goals.   Pt prognosis is Good.     Pt will continue to benefit from skilled outpatient physical therapy to address the deficits listed in the problem list box on initial evaluation, provide pt/family education and to maximize pt's level of independence in the home and community environment.     Pt's spiritual, cultural and educational needs considered and pt agreeable to plan of care and goals.     Anticipated barriers to physical therapy:  Attendance (patient reports he will be out of town intermittently over the next few weeks)    Goals:   Short Term Goals: 4 weeks   1) Facilitate improved LE flexibility Initiated  2) Facilitate improved posture  Initiated  3) Patient will be independent in HEP for core and LE strengthening/flexibility Initiated     Long Term Goals: 8 weeks   1) Improve Blackman Balance score to > 50/56 Initiated  2) Improve number of sit <>  30 sec to > 20 Ongoing  3) Patient will consistently get out of bed and initiate gait without veering off path  Ongoing  4) Patient will increase standing tolerance to > 20 min without significant fatigue Ongoing  5) Improve functional mobility to < 25% deficit as indicated by FOTO  Lower Leg (w/o Knee) Survey Ongoing  6) Patient will be independent in complete HEP including balance retraining activities.  Initiated    PLAN     Add shuttle leg press (B and single leg), gluteus medius dips.     Missy Menendez, PT

## 2022-09-09 ENCOUNTER — PATIENT MESSAGE (OUTPATIENT)
Dept: FAMILY MEDICINE | Facility: CLINIC | Age: 72
End: 2022-09-09
Payer: MEDICARE

## 2022-11-17 ENCOUNTER — PATIENT MESSAGE (OUTPATIENT)
Dept: FAMILY MEDICINE | Facility: CLINIC | Age: 72
End: 2022-11-17
Payer: MEDICARE

## 2022-11-17 DIAGNOSIS — E78.2 MIXED HYPERLIPIDEMIA: ICD-10-CM

## 2022-11-17 DIAGNOSIS — I25.10 ARTERIOSCLEROTIC CARDIOVASCULAR DISEASE: ICD-10-CM

## 2022-11-18 RX ORDER — PRAVASTATIN SODIUM 20 MG/1
20 TABLET ORAL DAILY
Qty: 90 TABLET | Refills: 3 | Status: SHIPPED | OUTPATIENT
Start: 2022-11-18 | End: 2022-12-13 | Stop reason: SDUPTHER

## 2022-11-29 ENCOUNTER — OFFICE VISIT (OUTPATIENT)
Dept: DERMATOLOGY | Facility: CLINIC | Age: 72
End: 2022-11-29
Payer: MEDICARE

## 2022-11-29 DIAGNOSIS — I78.1 TELANGIECTASIAS: Primary | ICD-10-CM

## 2022-11-29 DIAGNOSIS — M71.30 MYXOID CYST: ICD-10-CM

## 2022-11-29 DIAGNOSIS — L60.3 NAIL DYSTROPHY: ICD-10-CM

## 2022-11-29 PROCEDURE — 99213 OFFICE O/P EST LOW 20 MIN: CPT | Mod: PBBFAC,PO | Performed by: STUDENT IN AN ORGANIZED HEALTH CARE EDUCATION/TRAINING PROGRAM

## 2022-11-29 PROCEDURE — 99202 PR OFFICE/OUTPT VISIT, NEW, LEVL II, 15-29 MIN: ICD-10-PCS | Mod: S$PBB,,, | Performed by: STUDENT IN AN ORGANIZED HEALTH CARE EDUCATION/TRAINING PROGRAM

## 2022-11-29 PROCEDURE — 99999 PR PBB SHADOW E&M-EST. PATIENT-LVL III: ICD-10-PCS | Mod: PBBFAC,,, | Performed by: STUDENT IN AN ORGANIZED HEALTH CARE EDUCATION/TRAINING PROGRAM

## 2022-11-29 PROCEDURE — 99202 OFFICE O/P NEW SF 15 MIN: CPT | Mod: S$PBB,,, | Performed by: STUDENT IN AN ORGANIZED HEALTH CARE EDUCATION/TRAINING PROGRAM

## 2022-11-29 PROCEDURE — 99999 PR PBB SHADOW E&M-EST. PATIENT-LVL III: CPT | Mod: PBBFAC,,, | Performed by: STUDENT IN AN ORGANIZED HEALTH CARE EDUCATION/TRAINING PROGRAM

## 2022-11-29 NOTE — PROGRESS NOTES
Subjective:       Patient ID:  Aaron Ross is a 72 y.o. male who presents for   Chief Complaint   Patient presents with    Spot     5th digit right hand     New Patient    Patient here today for:Spot    -5th digit on right hand    -x 1 year    -Goes and comes. Raised and tender. Drains occasionally     -No treatment    -        Derm Hx:   Denies Phx NMSC    Review of Systems   Constitutional:  Negative for fever and chills.   Respiratory:  Negative for cough and shortness of breath.    Gastrointestinal:  Negative for nausea and vomiting.   Hematologic/Lymphatic: Bruises/bleeds easily.      Objective:    Physical Exam   Constitutional: He appears well-developed and well-nourished.   Neurological: He is alert and oriented to person, place, and time.   Psychiatric: He has a normal mood and affect.   Skin:   Areas Examined (abnormalities noted in diagram):   Head / Face Inspection Performed  RUE Inspected  Nails and Digits Inspection Performed                Diagram Legend     Erythematous scaling macule/papule c/w actinic keratosis       Vascular papule c/w angioma      Pigmented verrucoid papule/plaque c/w seborrheic keratosis      Yellow umbilicated papule c/w sebaceous hyperplasia      Irregularly shaped tan macule c/w lentigo     1-2 mm smooth white papules consistent with Milia      Movable subcutaneous cyst with punctum c/w epidermal inclusion cyst      Subcutaneous movable cyst c/w pilar cyst      Firm pink to brown papule c/w dermatofibroma      Pedunculated fleshy papule(s) c/w skin tag(s)      Evenly pigmented macule c/w junctional nevus     Mildly variegated pigmented, slightly irregular-bordered macule c/w mildly atypical nevus      Flesh colored to evenly pigmented papule c/w intradermal nevus       Pink pearly papule/plaque c/w basal cell carcinoma      Erythematous hyperkeratotic cursted plaque c/w SCC      Surgical scar with no sign of skin cancer recurrence      Open and closed comedones       Inflammatory papules and pustules      Verrucoid papule consistent consistent with wart     Erythematous eczematous patches and plaques     Dystrophic onycholytic nail with subungual debris c/w onychomycosis     Umbilicated papule    Erythematous-base heme-crusted tan verrucoid plaque consistent with inflamed seborrheic keratosis     Erythematous Silvery Scaling Plaque c/w Psoriasis     See annotation      Assessment / Plan:        Telangiectasias  - secondary to rosacea, photoaging  - treatment with lasers would help, he defers today    Myxoid cyst  Nail dystrophy  -     Ambulatory referral/consult to Dermatology  -     Ambulatory referral/consult to Hand Surgery; Future; Expected date: 12/06/2022  Discussed etiology,he would like it removed, referral to hand surgery             No follow-ups on file.

## 2022-12-13 ENCOUNTER — PATIENT MESSAGE (OUTPATIENT)
Dept: FAMILY MEDICINE | Facility: CLINIC | Age: 72
End: 2022-12-13
Payer: MEDICARE

## 2022-12-13 DIAGNOSIS — I25.10 ARTERIOSCLEROTIC CARDIOVASCULAR DISEASE: ICD-10-CM

## 2022-12-13 DIAGNOSIS — E78.2 MIXED HYPERLIPIDEMIA: ICD-10-CM

## 2022-12-13 RX ORDER — PRAVASTATIN SODIUM 20 MG/1
20 TABLET ORAL DAILY
Qty: 90 TABLET | Refills: 0 | Status: SHIPPED | OUTPATIENT
Start: 2022-12-13 | End: 2023-03-08 | Stop reason: SDUPTHER

## 2023-01-09 ENCOUNTER — OFFICE VISIT (OUTPATIENT)
Dept: ORTHOPEDICS | Facility: CLINIC | Age: 73
End: 2023-01-09
Payer: MEDICARE

## 2023-01-09 VITALS — HEIGHT: 73 IN | WEIGHT: 270 LBS | BODY MASS INDEX: 35.78 KG/M2

## 2023-01-09 DIAGNOSIS — M67.40 MUCOID CYST OF JOINT: Primary | ICD-10-CM

## 2023-01-09 PROCEDURE — 99212 OFFICE O/P EST SF 10 MIN: CPT | Mod: PBBFAC,PN | Performed by: ORTHOPAEDIC SURGERY

## 2023-01-09 PROCEDURE — 99999 PR PBB SHADOW E&M-EST. PATIENT-LVL II: ICD-10-PCS | Mod: PBBFAC,,, | Performed by: ORTHOPAEDIC SURGERY

## 2023-01-09 PROCEDURE — 99203 OFFICE O/P NEW LOW 30 MIN: CPT | Mod: S$PBB,,, | Performed by: ORTHOPAEDIC SURGERY

## 2023-01-09 PROCEDURE — 99203 PR OFFICE/OUTPT VISIT, NEW, LEVL III, 30-44 MIN: ICD-10-PCS | Mod: S$PBB,,, | Performed by: ORTHOPAEDIC SURGERY

## 2023-01-09 PROCEDURE — 99999 PR PBB SHADOW E&M-EST. PATIENT-LVL II: CPT | Mod: PBBFAC,,, | Performed by: ORTHOPAEDIC SURGERY

## 2023-01-09 NOTE — PROGRESS NOTES
1/9/2023    Chief Complaint:  Chief Complaint   Patient presents with    Right Hand - Pain     Right small finger cyst        HPI:  Aaron Ross is a 72 y.o. male, who presents to clinic today has a history of a cyst on his right small finger.  He states that he has been there for months.  He states that it has ruptured a couple of times and keeps recurring.  States that he has discuss this with his dermatologist who referred him to me.  He has no new complaints    PMHX:  Past Medical History:   Diagnosis Date    Arthritis     BMI 36.0-36.9,adult 6/25/2018    Sleep apnea     USES CPAP    Thyroid disease     Varicose veins of both lower extremities     Wears glasses        PSHX:  Past Surgical History:   Procedure Laterality Date    BACK SURGERY      PAPAYA JUICE INJECTION    JOINT REPLACEMENT Right 10/01/2012    KNEE SURGERY      THYROID SURGERY      PARTIAL    UVULA      REMOVED FOR SLEEP APNEA       FMHX:  No family history on file.    SOCHX:  Social History     Tobacco Use    Smoking status: Never    Smokeless tobacco: Never   Substance Use Topics    Alcohol use: Yes     Comment: RARELY       ALLERGIES:  Adhesive, Ofloxacin, and Adhesive tape-silicones    CURRENT MEDICATIONS:  Current Outpatient Medications on File Prior to Visit   Medication Sig Dispense Refill    ascorbic acid, vitamin C, (VITAMIN C) 250 MG tablet Take 500 mg by mouth.      aspirin (ECOTRIN) 81 MG EC tablet Take 81 mg by mouth once daily.      cholecalciferol, vitamin D3, (VITAMIN D3) 50 mcg (2,000 unit) Cap capsule Take 50 mg by mouth.      ketoconazole (NIZORAL) 2 % shampoo Apply 1 Applicatorful topically every other day. 480 mL 3    pravastatin (PRAVACHOL) 20 MG tablet Take 1 tablet (20 mg total) by mouth once daily. 90 tablet 0    psyllium (KONSYL) Powd Take 1 packet by mouth.      donepeziL (ARICEPT) 10 MG tablet Take 1 tablet (10 mg total) by mouth once daily. 90 tablet 3    irbesartan (AVAPRO) 75 MG tablet Take 1 tablet (75 mg  "total) by mouth once daily. 90 tablet 3    tamsulosin (FLOMAX) 0.4 mg Cap Take 1 capsule (0.4 mg total) by mouth once daily. 90 capsule 3     No current facility-administered medications on file prior to visit.       REVIEW OF SYSTEMS:  Review of Systems   Constitutional: Negative.    HENT: Negative.     Eyes: Negative.    Respiratory: Negative.     Cardiovascular: Negative.    Gastrointestinal: Negative.    Genitourinary: Negative.    Musculoskeletal:  Positive for joint pain.   Skin: Negative.    Neurological: Negative.    Endo/Heme/Allergies: Negative.    Psychiatric/Behavioral: Negative.       GENERAL PHYSICAL EXAM:   Ht 6' 1" (1.854 m)   Wt 122.5 kg (270 lb)   BMI 35.62 kg/m²    GEN: well developed, well nourished, no acute distress   HENT: Normocephalic, atraumatic   EYES: No discharge, conjunctiva normal   NECK: Supple, non-tender   PULM: No wheezing, no respiratory distress   CV: RRR   ABD: Soft, non-tender    ORTHO EXAM:   Examination the right hand and small finger reveals that there is a mass overlying the proximal nail fold.  This mass measures 0.5 x 0.5 cm in size.  It appears to be fluid-filled.  There is an overlying eschar.  There is no erythema or drainage at this time.  He has no tenderness proximally.  He can flex and extend the finger.  He has capillary refill less than 2 seconds at the tip    RADIOLOGY:   None    ASSESSMENT:   Right small finger mucoid cyst    PLAN:  1. I have discussed treatment with the patient.  I have discussed the possibility of surgical excision.  After discussion of the risks and benefits of the procedure informed consent has been obtained    2.  Will proceed with right small finger mucoid cyst excision under local anesthesia    3. Will follow up with me 2 weeks postoperatively   "

## 2023-01-09 NOTE — PATIENT INSTRUCTIONS
Surgery Instructions:     Your surgery is scheduled on 01/19/23 at the surgery center: [address] [facility] [address], 1st floor, second entrance.    The pre-op department will be in contact with you prior to your procedure to review medications and instructions.       Nothing to eat or drink after midnight prior to day of surgery.    The surgery center will contact you the day prior to surgery to advise you of your arrival time for surgery.     Your post op appointment is scheduled on 02/01/23 @ 1:00pm.    You will be contacted by an automated text message every morning for for 14 days after your surgery inquiring if you have any COVID symptoms.  If you have any concerns regarding COVID please reply to the text message and then an [facility] On Call Registered Nurse will contact you later that day.

## 2023-01-10 DIAGNOSIS — M67.40 MUCOID CYST OF JOINT: Primary | ICD-10-CM

## 2023-01-10 RX ORDER — MUPIROCIN 20 MG/G
OINTMENT TOPICAL
Status: CANCELLED | OUTPATIENT
Start: 2023-01-10

## 2023-01-19 ENCOUNTER — HOSPITAL ENCOUNTER (OUTPATIENT)
Facility: HOSPITAL | Age: 73
Discharge: HOME | End: 2023-01-19
Attending: ORTHOPAEDIC SURGERY | Admitting: ORTHOPAEDIC SURGERY
Payer: MEDICARE

## 2023-01-19 DIAGNOSIS — M67.40 MUCOID CYST OF JOINT: ICD-10-CM

## 2023-01-19 PROCEDURE — 26115 EXC HAND LES SC < 1.5 CM: CPT | Mod: RT,,, | Performed by: ORTHOPAEDIC SURGERY

## 2023-01-19 PROCEDURE — 25000003 PHARM REV CODE 250: Mod: PO | Performed by: PHYSICIAN ASSISTANT

## 2023-01-19 PROCEDURE — 88304 PR  SURG PATH,LEVEL III: ICD-10-PCS | Mod: 26,,, | Performed by: PATHOLOGY

## 2023-01-19 PROCEDURE — 71000015 HC POSTOP RECOV 1ST HR: Mod: PO | Performed by: ORTHOPAEDIC SURGERY

## 2023-01-19 PROCEDURE — 88304 TISSUE EXAM BY PATHOLOGIST: CPT | Performed by: PATHOLOGY

## 2023-01-19 PROCEDURE — 36000707: Mod: PO | Performed by: ORTHOPAEDIC SURGERY

## 2023-01-19 PROCEDURE — 36000706: Mod: PO | Performed by: ORTHOPAEDIC SURGERY

## 2023-01-19 PROCEDURE — 63600175 PHARM REV CODE 636 W HCPCS: Mod: PO | Performed by: PHYSICIAN ASSISTANT

## 2023-01-19 PROCEDURE — 26115 PR EXC TUM/VASC MAL SFT TISS HAND/FNGR SUBQ <1.5CM: ICD-10-PCS | Mod: RT,,, | Performed by: ORTHOPAEDIC SURGERY

## 2023-01-19 PROCEDURE — 88304 TISSUE EXAM BY PATHOLOGIST: CPT | Mod: 26,,, | Performed by: PATHOLOGY

## 2023-01-19 PROCEDURE — 25000003 PHARM REV CODE 250: Mod: PO | Performed by: ORTHOPAEDIC SURGERY

## 2023-01-19 PROCEDURE — 88307 TISSUE EXAM BY PATHOLOGIST: CPT | Performed by: PATHOLOGY

## 2023-01-19 RX ORDER — MUPIROCIN 20 MG/G
OINTMENT TOPICAL
Status: DISCONTINUED | OUTPATIENT
Start: 2023-01-19 | End: 2023-01-19 | Stop reason: HOSPADM

## 2023-01-19 RX ORDER — IBUPROFEN 600 MG/1
600 TABLET ORAL EVERY 6 HOURS PRN
Qty: 8 TABLET | Refills: 0 | Status: SHIPPED | OUTPATIENT
Start: 2023-01-19 | End: 2023-06-05

## 2023-01-19 RX ORDER — BUPIVACAINE HYDROCHLORIDE 5 MG/ML
INJECTION, SOLUTION EPIDURAL; INTRACAUDAL
Status: DISCONTINUED | OUTPATIENT
Start: 2023-01-19 | End: 2023-01-19 | Stop reason: HOSPADM

## 2023-01-19 RX ORDER — LIDOCAINE HYDROCHLORIDE 10 MG/ML
INJECTION INFILTRATION; PERINEURAL
Status: DISCONTINUED | OUTPATIENT
Start: 2023-01-19 | End: 2023-01-19 | Stop reason: HOSPADM

## 2023-01-19 RX ADMIN — DEXTROSE 3 G: 50 INJECTION, SOLUTION INTRAVENOUS at 08:01

## 2023-01-19 RX ADMIN — MUPIROCIN: 20 OINTMENT TOPICAL at 08:01

## 2023-01-19 NOTE — PLAN OF CARE
Pt VSS, denies recent fever or illness. Belongings placed under stretcher, no valuables per pt. Consents signed by pt. Pt ready for procedure.

## 2023-01-19 NOTE — OP NOTE
Aaron Ross  1950    DATE OF SURGERY: 1/19/2023     PRE-OPERATIVE DIAGNOSIS:  Right small finger mass/mucoid cyst    POST-OPERATIVE DIAGNOSIS:  Right small finger mass/mucoid cyst     ANESTHESIA TYPE:  Local    BLOOD LOSS:  Less than 10 cc    TOURNIQUET TIME:  Approximately 10 minutes    SURGEON: Dr Edwards    ASSISTANT:  Nikita Comer    PROCEDURE:  Right small finger mass/mucoid cyst excision    IMPLANTS:  None     SPECIMENS:  Right small finger mass for pathology    INDICATION:     Mr. Ross presented to my clinic with a history of a recurring cyst or mass over the tip of his small finger.  He states that it had ruptured externally several times but continues to reoccur.  It has gotten significantly tender and is limiting his function and therefore had a discussion with the patient about the risks and benefits of surgical excision.  Informed consent was then obtained.    PROCEDURE IN DETAIL:     Mr. Ross was transported to the operating room and was placed supine on the operating room table. All appropriate points were padded. The right arm and hand was prepped and draped in the normal sterile fashion. Time out was called. The correct patient, correct operative site, correct procedure, antibiotic administration which consisted of 3 g of Ancef, and allergies to medications which are to Adhesive tape-silicones and Fluorouracil  were reviewed. Time in was then called.     Attention was turned to the right small finger.  An elliptical incision was made overlying the mucoid cyst.  A skin paddle including the ulcerated portion of skin was excised.  Dissection in the subcutaneous tissues confirmed a mass/mucoid cyst.  Dissection around the mass was performed and the stalk which was noted to be traversing to the distal interphalangeal joint was visualized.  The entire mass and stalk were removed.  The wound was then curetted.  The wound was copiously irrigated.  The finger tourniquet was taken off  and hemostasis was obtained.  The wound was closed with 5 0 chromic suture superficially.  The wound was dressed with Xeroform, Eb wrap, and Coban dressing.    The patient was awakened from anesthesia and was transported to the recovery room in stable condition. All lap, needle, sponge, and equipment counts were correct at the end of the case.    POST-OPERATIVE PLAN:     Patient will keep this dressing in place for 2 weeks which time he will follow up with me.  Sutures can be removed at that time and he can begin range of motion

## 2023-01-19 NOTE — DISCHARGE SUMMARY
[facility] - Surgery  Discharge Note  Short Stay    Procedure(s) (LRB):  Right small finger mucoid cyst excision (Right)      OUTCOME: Patient tolerated treatment/procedure well without complication and is now ready for discharge.    DISPOSITION: Home or Self Care    FINAL DIAGNOSIS:  Mucoid cyst of joint    FOLLOWUP: In clinic    DISCHARGE INSTRUCTIONS:    Discharge Procedure Orders   Diet general     Activity as tolerated     Keep surgical extremity elevated     Lifting restrictions   Order Comments: Please limit lifting with the right arm and hand to 2-3 lb     Leave dressing on - Keep it clean, dry, and intact until clinic visit     Call MD for:  temperature >100.4     Call MD for:  persistent nausea and vomiting     Call MD for:  severe uncontrolled pain     Call MD for:  difficulty breathing, headache or visual disturbances     Call MD for:  redness, tenderness, or signs of infection (pain, swelling, redness, odor or green/yellow discharge around incision site)     Call MD for:  hives     Call MD for:  persistent dizziness or light-headedness     Call MD for:  extreme fatigue        TIME SPENT ON DISCHARGE: 15 minutes

## 2023-01-19 NOTE — PATIENT INSTRUCTIONS
Procedure:  Right small finger mass excision    1. Please keep the dressing clean, dry, and in place. Do not take it off and do not get it wet.    2. Please keep the right arm and hand elevated for the 1st 24-48 hours to prevent swelling    3. Flexion and extension of the exposed fingers is encouraged, but do not attempt to push off or lift more than 1-2 pounds with right arm or hand    4. Please limit weightbearing to the right hand to 1-2 pounds. Light activity such as brushing your teeth, using a fork, or lifting a small drink is allowed starting today.    5. Ibuprofen has been prescribed for pain.  Please take them as necessary.    6. If there are any questions or concerns please call Dr. Edwards's office at [phone]    7.  Follow up with Dr. Edwards in 2 weeks

## 2023-01-20 VITALS
BODY MASS INDEX: 35.78 KG/M2 | DIASTOLIC BLOOD PRESSURE: 66 MMHG | TEMPERATURE: 98 F | RESPIRATION RATE: 16 BRPM | SYSTOLIC BLOOD PRESSURE: 120 MMHG | OXYGEN SATURATION: 99 % | HEIGHT: 73 IN | WEIGHT: 270 LBS | HEART RATE: 60 BPM

## 2023-01-25 LAB
FINAL PATHOLOGIC DIAGNOSIS: NORMAL
GROSS: NORMAL
Lab: NORMAL

## 2023-02-01 ENCOUNTER — OFFICE VISIT (OUTPATIENT)
Dept: ORTHOPEDICS | Facility: CLINIC | Age: 73
End: 2023-02-01
Payer: MEDICARE

## 2023-02-01 DIAGNOSIS — M67.40 MUCOID CYST OF JOINT: Primary | ICD-10-CM

## 2023-02-01 PROCEDURE — 99999 PR PBB SHADOW E&M-EST. PATIENT-LVL II: CPT | Mod: PBBFAC,,, | Performed by: ORTHOPAEDIC SURGERY

## 2023-02-01 PROCEDURE — 99024 PR POST-OP FOLLOW-UP VISIT: ICD-10-PCS | Mod: POP,,, | Performed by: ORTHOPAEDIC SURGERY

## 2023-02-01 PROCEDURE — 99999 PR PBB SHADOW E&M-EST. PATIENT-LVL II: ICD-10-PCS | Mod: PBBFAC,,, | Performed by: ORTHOPAEDIC SURGERY

## 2023-02-01 PROCEDURE — 99212 OFFICE O/P EST SF 10 MIN: CPT | Mod: PBBFAC,PN | Performed by: ORTHOPAEDIC SURGERY

## 2023-02-01 PROCEDURE — 99024 POSTOP FOLLOW-UP VISIT: CPT | Mod: POP,,, | Performed by: ORTHOPAEDIC SURGERY

## 2023-02-01 NOTE — PROGRESS NOTES
Mr Ross to clinic today.  Has a history of right small finger mucoid cyst excision.  He is 2 weeks postop and is doing well     Physical exam:  Examination the right small finger reveals that the wound is healing well.  There are sutures in place.  There is no edema or erythema.  He is neurovascularly intact.      Assessment: Status post right small finger mucoid cyst excision     Plan:    1.  Sutures were removed today and Steri-Strips was placed     2. Will continue limited weight-bearing    3. Will follow up in 2-3 weeks for repeat evaluation

## 2023-02-23 DIAGNOSIS — I25.10 ARTERIOSCLEROTIC CARDIOVASCULAR DISEASE: ICD-10-CM

## 2023-02-23 DIAGNOSIS — E78.2 MIXED HYPERLIPIDEMIA: ICD-10-CM

## 2023-03-17 RX ORDER — PRAVASTATIN SODIUM 20 MG/1
TABLET ORAL
Qty: 90 TABLET | Refills: 3 | OUTPATIENT
Start: 2023-03-17

## 2023-03-24 ENCOUNTER — DOCUMENTATION ONLY (OUTPATIENT)
Dept: REHABILITATION | Facility: HOSPITAL | Age: 73
End: 2023-03-24
Payer: MEDICARE

## 2023-03-24 DIAGNOSIS — M62.81 MUSCLE WEAKNESS OF LOWER EXTREMITY: ICD-10-CM

## 2023-03-24 DIAGNOSIS — R26.89 BALANCE DISORDER: Primary | ICD-10-CM

## 2023-03-24 NOTE — PROGRESS NOTES
[facility]  PT Discharge Note    Name: Aaron Ross  Clinic Number: [id number]    Therapy Diagnosis:   Encounter Diagnoses   Name Primary?    Balance disorder Yes    Muscle weakness of lower extremity      Physician: Dulce Horowitz, NP     Physician Orders: PT Eval and Treat   Medical Diagnosis from Referral: Muscle weakness of LE  Evaluation Date: 9/2/2022    Date of Last visit: 9/8/2022  Total Visits Received: 2    ASSESSMENT      Patient did not complete plan of care.  See treatment notes for progression    Discharge reason: Patient has not attended therapy since 9/8/2022    Discharge FOTO Score: N/A    Goals: as documented in final treatment note  Short Term Goals: 4 weeks   1) Facilitate improved LE flexibility Initiated  2) Facilitate improved posture  Initiated  3) Patient will be independent in HEP for core and LE strengthening/flexibility Initiated     Long Term Goals: 8 weeks   1) Improve Blackman Balance score to > 50/56 Initiated  2) Improve number of sit <>  30 sec to > 20 Ongoing  3) Patient will consistently get out of bed and initiate gait without veering off path  Ongoing  4) Patient will increase standing tolerance to > 20 min without significant fatigue Ongoing  5) Improve functional mobility to < 25% deficit as indicated by FOTO  Lower Leg (w/o Knee) Survey Ongoing  6) Patient will be independent in complete HEP including balance retraining activities.  Initiated    PLAN   This patient is discharged from Physical Therapy      Missy Menendez PT

## 2023-06-05 ENCOUNTER — OFFICE VISIT (OUTPATIENT)
Dept: FAMILY MEDICINE | Facility: CLINIC | Age: 73
End: 2023-06-05
Payer: MEDICARE

## 2023-06-05 VITALS
BODY MASS INDEX: 34.96 KG/M2 | HEIGHT: 73 IN | OXYGEN SATURATION: 94 % | DIASTOLIC BLOOD PRESSURE: 74 MMHG | HEART RATE: 60 BPM | WEIGHT: 263.81 LBS | SYSTOLIC BLOOD PRESSURE: 122 MMHG | TEMPERATURE: 98 F

## 2023-06-05 DIAGNOSIS — R41.89 IMPAIRED COGNITION: ICD-10-CM

## 2023-06-05 DIAGNOSIS — E78.5 HYPERLIPIDEMIA, UNSPECIFIED HYPERLIPIDEMIA TYPE: ICD-10-CM

## 2023-06-05 DIAGNOSIS — Z79.82 ASPIRIN LONG-TERM USE: ICD-10-CM

## 2023-06-05 DIAGNOSIS — R53.83 FATIGUE, UNSPECIFIED TYPE: ICD-10-CM

## 2023-06-05 DIAGNOSIS — E66.01 SEVERE OBESITY (BMI 35.0-35.9 WITH COMORBIDITY): ICD-10-CM

## 2023-06-05 DIAGNOSIS — G47.33 OSA TREATED WITH BIPAP: ICD-10-CM

## 2023-06-05 DIAGNOSIS — I10 HYPERTENSION, ESSENTIAL: Primary | ICD-10-CM

## 2023-06-05 DIAGNOSIS — Z96.652 STATUS POST TOTAL LEFT KNEE REPLACEMENT: ICD-10-CM

## 2023-06-05 DIAGNOSIS — Z12.5 PROSTATE CANCER SCREENING: ICD-10-CM

## 2023-06-05 DIAGNOSIS — S90.31XD CONTUSION OF RIGHT FOOT, SUBSEQUENT ENCOUNTER: ICD-10-CM

## 2023-06-05 DIAGNOSIS — R35.1 BPH ASSOCIATED WITH NOCTURIA: ICD-10-CM

## 2023-06-05 DIAGNOSIS — N40.1 BPH ASSOCIATED WITH NOCTURIA: ICD-10-CM

## 2023-06-05 PROCEDURE — 99214 OFFICE O/P EST MOD 30 MIN: CPT | Mod: S$GLB,,, | Performed by: FAMILY MEDICINE

## 2023-06-05 PROCEDURE — 99214 PR OFFICE/OUTPT VISIT, EST, LEVL IV, 30-39 MIN: ICD-10-PCS | Mod: S$GLB,,, | Performed by: FAMILY MEDICINE

## 2023-06-05 RX ORDER — TAMSULOSIN HYDROCHLORIDE 0.4 MG/1
CAPSULE ORAL
Qty: 180 CAPSULE | Refills: 0 | Status: SHIPPED | OUTPATIENT
Start: 2023-06-05 | End: 2023-06-12 | Stop reason: SDUPTHER

## 2023-06-05 RX ORDER — TAMSULOSIN HYDROCHLORIDE 0.4 MG/1
CAPSULE ORAL DAILY
COMMUNITY
End: 2023-06-05 | Stop reason: CLARIF

## 2023-06-05 RX ORDER — DONEPEZIL HYDROCHLORIDE 10 MG/1
10 TABLET, FILM COATED ORAL DAILY
Qty: 90 TABLET | Refills: 3 | Status: SHIPPED | OUTPATIENT
Start: 2023-06-05 | End: 2023-10-03 | Stop reason: SDUPTHER

## 2023-06-05 RX ORDER — IRBESARTAN 75 MG/1
75 TABLET ORAL DAILY
Qty: 90 TABLET | Refills: 3 | Status: CANCELLED | OUTPATIENT
Start: 2023-06-05 | End: 2024-05-30

## 2023-06-05 RX ORDER — PRAVASTATIN SODIUM 20 MG/1
20 TABLET ORAL DAILY
Qty: 30 TABLET | Refills: 0 | Status: ON HOLD | OUTPATIENT
Start: 2023-06-05 | End: 2023-07-25 | Stop reason: HOSPADM

## 2023-06-06 ENCOUNTER — TELEPHONE (OUTPATIENT)
Dept: UROLOGY | Facility: CLINIC | Age: 73
End: 2023-06-06
Payer: MEDICARE

## 2023-06-06 PROBLEM — E66.01 SEVERE OBESITY (BMI 35.0-35.9 WITH COMORBIDITY): Status: ACTIVE | Noted: 2023-06-06

## 2023-06-06 PROBLEM — N40.1 BPH ASSOCIATED WITH NOCTURIA: Status: ACTIVE | Noted: 2023-06-06

## 2023-06-06 PROBLEM — R35.1 BPH ASSOCIATED WITH NOCTURIA: Status: ACTIVE | Noted: 2023-06-06

## 2023-06-06 RX ORDER — PRAVASTATIN SODIUM 20 MG/1
20 TABLET ORAL DAILY
Qty: 90 TABLET | Refills: 3 | Status: ON HOLD | OUTPATIENT
Start: 2023-06-06 | End: 2023-07-25 | Stop reason: HOSPADM

## 2023-06-06 NOTE — TELEPHONE ENCOUNTER
Care Due:                  Date            Visit Type   Department     Provider  --------------------------------------------------------------------------------                                MYCHART                              [facility]  Last Visit: 06-      FICE VISIT   [facility]  [place]                              EP -                              PR[facility]      [facility]  Next Visit: 12-      CARE ([facility])   [facility]  [place]                                                            Last  Test          Frequency    Reason                     Performed    Due Date  --------------------------------------------------------------------------------    CMP.........  12 months..  pravastatin..............  08- 08-    Lipid Panel.  12 months..  pravastatin..............  08- 08-    [facility] Embedded Care Due Messages. Reference number: [id number].   6/06/2023 3:36:20 PM CDT

## 2023-06-06 NOTE — TELEPHONE ENCOUNTER
Requesting refill for  Pravastatin      Last visit 6/5/2023 Next visit  12/7/2023 Requesting mail order   Medication pended

## 2023-06-06 NOTE — TELEPHONE ENCOUNTER
Pre appt call  Inquired about past urological care pt voiced only seen jogre one time.  Pt was informed will need urine upon arrival for appt   Pt vu

## 2023-06-06 NOTE — TELEPHONE ENCOUNTER
----- Message from Mica Scruggs sent at 6/6/2023  2:32 PM CDT -----  Type: Needs Medical Advice  Who Called:  pt  Symptoms (please be specific):  pt said he need to speak to the office said he need to know what's going on with his pravastatin (PRAVACHOL) 20 MG tablet said he out of meds and he about to go out of town tomorrow--said he need to speak to the nurse today please--please call and advise  [id number] - [place], [place] - [address] AT [facility] [address]  [address]  [place] [address]  Phone: [phone] Fax: [phone]    Best Call Back Number: [phone] (home)     Additional Information: he said he need to speak to the office b/c he rather this med go to [facility] b/c he have to pay full price but since he going out of town he need this to go to the pharmacy listed above-- siad he need the nurse to please call him so he can get this taken care of--he really need the meds before he leave--thank you

## 2023-06-07 ENCOUNTER — PATIENT MESSAGE (OUTPATIENT)
Dept: FAMILY MEDICINE | Facility: CLINIC | Age: 73
End: 2023-06-07
Payer: MEDICARE

## 2023-06-07 ENCOUNTER — TELEPHONE (OUTPATIENT)
Dept: FAMILY MEDICINE | Facility: CLINIC | Age: 73
End: 2023-06-07
Payer: MEDICARE
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
## 2023-06-08 NOTE — TELEPHONE ENCOUNTER
----- Message from Priyanka River sent at 6/7/2023  2:50 PM CDT -----  Contact: pt  Pt is calling and would like a call back about medication   Please give pt a call back [phone]  Pt is at [facility] waiting for answer

## 2023-06-10 NOTE — PROGRESS NOTES
[facility] Urology New Patient/H&P:     Aaron Ross is a 73 y.o. male who presents for BPH evaluation at referral of Dr Lemon    Last seen by Dr Lemon on 6/6/23 noting  Obstructive sleep apnea using his BiPAP regularly.  BPH with nocturia.  PSA 1.1.  Check is due again.  Nocturia 2 times a night.  Status post right knee replacement. On aricept, refilled. As well had been on flomax 0.4 and was advised to increase to 0.8mg  Recent ER visit 5/23/23 for contusion of foot.  Notes he has a hairline fracture.    He presents today noting  Has not increased dose of flomax yet  Has been on it about 3 years, and initially worked well.  Now does not.  Also been on aricept about 4 years.   Father had prostate cancer diagnosed late in his 70s or 80s and lived into his 90s. He did have radiation though and complications from it.  He did have bladder ultrasound in 2/2020 noting: The prevoid bladder volume is 330 cubic cm.  The postvoid bladder volume is 60 cubic cm.  The prostate gland measures 3.8 x 4.0 x 3.6 cm.  - Pt reports that he did void 3 different times before getting to that PVR noting to empty about a 3rd each time.  Before the final was documented.  AUA SS:  23/4 (5: Emptying, intermittency; 4: Weak stream; 3: Frequency, straining; 2: Sleeping; 1: Urgency).  Medication helps 5/10.  Again noting that it helps a lot at 1st but the effects have diminished.  Feels the incomplete emptying b  He did have updated labs this AM from primary care:  Today: PSA 1.2, remainder as below.   0-2 soft drinks per day. When increased to 2, urgency worse, so replaced with tea which is better from urgency standpoint. But went to water.  Would like to be off bp meds, notes has had issues at times with it being low.    Past Medical History:   Diagnosis Date    Arthritis     BMI 36.0-36.9,adult 6/25/2018    Sleep apnea     USES CPAP    Thyroid disease     Varicose veins of both lower extremities     Wears glasses        Past Surgical  History:   Procedure Laterality Date    BACK SURGERY      PAPAYA JUICE INJECTION    FINGER MASS EXCISION Right 01/19/2023    Procedure: Right small finger mucoid cyst excision;  Surgeon: Anthony Edwards MD;  Location: [facility] OR;  Service: Orthopedics;  Laterality: Right;    JOINT REPLACEMENT Right 10/01/2012    KNEE SURGERY  2009    THYROID SURGERY  2013    PARTIAL    UVULA  2000    REMOVED FOR SLEEP APNEA       History reviewed. No pertinent family history.    Social History     Socioeconomic History    Marital status:    Occupational History    Occupation: RETIRED   Tobacco Use    Smoking status: Never    Smokeless tobacco: Never   Substance and Sexual Activity    Alcohol use: Yes     Comment: RARELY    Drug use: No    Sexual activity: Yes     Partners: Female       Review of patient's allergies indicates:   Allergen Reactions    Adhesive tape-silicones Rash     Allergy to adhesive on gauze - paper & regular tape ok       Medications Reviewed: see MAR    Focused Physical Exam    Vitals:    06/12/23 0904   BP: 126/66   Pulse: 62     Body mass index is 36.62 kg/m². Weight: 122.5 kg (270 lb) Height: 6' (182.9 cm)       Abdomen: Soft, non-tender, nondistended, no CVA tenderness  : deferred to time of prostate biopsy      LABS:    Recent Results (from the past 336 hour(s))   CBC Auto Differential    Collection Time: 06/12/23  8:08 AM   Result Value Ref Range    WBC 6.86 3.90 - 12.70 K/uL    RBC 4.38 (L) 4.60 - 6.20 M/uL    Hemoglobin 13.9 (L) 14.0 - 18.0 g/dL    Hematocrit 41.3 40.0 - 54.0 %    MCV 94 82 - 98 fL    MCH 31.7 (H) 27.0 - 31.0 pg    MCHC 33.7 32.0 - 36.0 g/dL    RDW 13.2 11.5 - 14.5 %    Platelets 172 150 - 450 K/uL    MPV 9.8 9.2 - 12.9 fL    Immature Granulocytes 0.4 0.0 - 0.5 %    Gran # (ANC) 3.3 1.8 - 7.7 K/uL    Immature Grans (Abs) 0.03 0.00 - 0.04 K/uL    Lymph # 2.4 1.0 - 4.8 K/uL    Mono # 0.9 0.3 - 1.0 K/uL    Eos # 0.2 0.0 - 0.5 K/uL    Baso # 0.04 0.00 - 0.20 K/uL    nRBC 0 0 /100  WBC    Gran % 48.4 38.0 - 73.0 %    Lymph % 34.7 18.0 - 48.0 %    Mono % 13.3 4.0 - 15.0 %    Eosinophil % 2.6 0.0 - 8.0 %    Basophil % 0.6 0.0 - 1.9 %    Differential Method Automated    Comprehensive Metabolic Panel    Collection Time: 06/12/23  8:08 AM   Result Value Ref Range    Sodium 138 136 - 145 mmol/L    Potassium 3.9 3.5 - 5.1 mmol/L    Chloride 106 95 - 110 mmol/L    CO2 27 23 - 29 mmol/L    Glucose 96 70 - 110 mg/dL    BUN 17 8 - 23 mg/dL    Creatinine 1.1 0.5 - 1.4 mg/dL    Calcium 8.9 8.7 - 10.5 mg/dL    Total Protein 6.7 6.0 - 8.4 g/dL    Albumin 4.1 3.5 - 5.2 g/dL    Total Bilirubin 1.0 0.1 - 1.0 mg/dL    Alkaline Phosphatase 86 55 - 135 U/L    AST 22 10 - 40 U/L    ALT 19 10 - 44 U/L    Anion Gap 5 (L) 8 - 16 mmol/L    eGFR >60.0 >60 mL/min/1.73 m^2   Lipid Panel    Collection Time: 06/12/23  8:08 AM   Result Value Ref Range    Cholesterol 105 (L) 120 - 199 mg/dL    Triglycerides 81 30 - 150 mg/dL    HDL 34 (L) 40 - 75 mg/dL    LDL Cholesterol 54.8 (L) 63.0 - 159.0 mg/dL    HDL/Cholesterol Ratio 32.4 20.0 - 50.0 %    Total Cholesterol/HDL Ratio 3.1 2.0 - 5.0    Non-HDL Cholesterol 71 mg/dL   PSA, Screening    Collection Time: 06/12/23  8:08 AM   Result Value Ref Range    PSA, Screen 1.2 0.00 - 4.00 ng/mL   TSH    Collection Time: 06/12/23  8:08 AM   Result Value Ref Range    TSH 4.080 0.340 - 5.600 uIU/mL   POCT Bladder Scan    Collection Time: 06/12/23  9:17 AM   Result Value Ref Range    POC Residual Urine Volume 28 0 - 100 mL   POCT Urinalysis(Instrument)    Collection Time: 06/12/23  9:41 AM   Result Value Ref Range    Color, POC UA Yellow Yellow, Straw, Colorless    Clarity, POC UA Clear Clear    Glucose, POC UA Negative Negative    Bilirubin, POC UA Negative Negative    Ketones, POC UA Negative Negative    Spec Grav POC UA >=1.030 1.005 - 1.030    Blood, POC UA Negative Negative    pH, POC UA 5.5 5.0 - 8.0    Protein, POC UA Negative Negative    Urobilinogen, POC UA 1.0 <=1.0    Nitrite, POC  UA Negative Negative    WBC, POC UA Negative Negative         Assessment/Diagnosis:    1. BPH with obstruction/lower urinary tract symptoms  Ambulatory referral/consult to Urology    POCT Urinalysis(Instrument)    POCT Bladder Scan          Plans:  Extensively reviewed medical history including workup and management by primary care as well as his lab work and normal PSA history and medical management of his obstructive lower urinary tract symptoms.  We did have a discussion about the natural history of BPH with obstructive symptoms, progressing even despite medication secondary to anatomic considerations, as well as the changes at the bladder level of chronic obstruction which can contribute to his urgency and frequency symptoms as well as trouble emptying.  He certainly does have all of these and severe symptoms despite Flomax 0.4 mg nightly, and we did discuss the indication to double the dose, however he is not started this yet and on review of his history I recommended that he do not double the dose.  We did review the new literature about the contributions of alpha-blocker such as Flomax to dementia and memory changes and he is already on Aricept and has been on it for a few years and therefore the risks outweigh the benefits in his case, especially with long-term alpha blocker with initial response, and now severe symptoms, would likely benefit from some form of BPH intervention to relieve prostate obstruction and allow him to discontinue medical therapy, especially with the likely changes of chronic obstruction contributing to his irritative symptoms.    We reviewed lower tract evaluation with cystoscopy and prostate ultrasound to help guide further recommendations.  Noted volumetric measurement of his prostate transabdominally was done in 2020 with a bladder scan PVR at that time.  He is emptying well today, but does still have sensation of incomplete emptying and does void multiple times to empty, so should  continue his alpha blocker for now pending workup, with goal of discontinuing medical therapy in the future.  Procedures in detail of diagnostic flexible cystourethroscopy including the use of local anesthesia with xylocaine jelly as well as transrectal ultrasound-guided volumetric measurement of the prostate were described to the patient in detail and he is agreeable to proceed after all questions were answered.  This was scheduled at the [facility] on 7/25/23.  His PSA is up-to-date and normal and stable as of today with primary care and we did discuss routine prostate cancer screening recommendations from 50-70, noting that a normal PSA in the 1 range at age 73 carries with it a less than 2% risk of clinically significant prostate cancer in his lifetime.  We did review that family history in a first-degree relative does technically increase risk, however his father was diagnosed late in his 80s and discussed a bit about the natural history of prostate cancer with aging and when routine screening is recommended and can likely discontinue routine prostate cancer screening and focus on the workup and management of his BPH and obstructive lower urinary tract symptoms.  He was agreeable.    All conservative recommendations for urgency and frequency were provided in writing and reviewed with the patient as well.  He can focus at this time on continuing to eliminate bladder irritants which he already has, as well as timed voiding to facilitate better bladder emptying, pending further evaluation.    Cysto/trus 7/25  Manish at time of workup, with normal PSA history    Total time spent in/on encounter today, including face to face time with patient, counseling, medical record review, interpretation of tests/results, , and treatment plan coordination: 60 minutes

## 2023-06-12 ENCOUNTER — OFFICE VISIT (OUTPATIENT)
Dept: UROLOGY | Facility: CLINIC | Age: 73
End: 2023-06-12
Payer: MEDICARE

## 2023-06-12 ENCOUNTER — LAB VISIT (OUTPATIENT)
Dept: LAB | Facility: HOSPITAL | Age: 73
End: 2023-06-12
Attending: FAMILY MEDICINE
Payer: MEDICARE

## 2023-06-12 VITALS
HEART RATE: 62 BPM | SYSTOLIC BLOOD PRESSURE: 126 MMHG | WEIGHT: 270 LBS | DIASTOLIC BLOOD PRESSURE: 66 MMHG | HEIGHT: 72 IN | BODY MASS INDEX: 36.57 KG/M2

## 2023-06-12 DIAGNOSIS — Z79.82 ASPIRIN LONG-TERM USE: ICD-10-CM

## 2023-06-12 DIAGNOSIS — N13.8 BPH WITH OBSTRUCTION/LOWER URINARY TRACT SYMPTOMS: Primary | ICD-10-CM

## 2023-06-12 DIAGNOSIS — N40.1 BPH WITH OBSTRUCTION/LOWER URINARY TRACT SYMPTOMS: Primary | ICD-10-CM

## 2023-06-12 DIAGNOSIS — R53.83 FATIGUE, UNSPECIFIED TYPE: ICD-10-CM

## 2023-06-12 DIAGNOSIS — I10 HYPERTENSION, ESSENTIAL: ICD-10-CM

## 2023-06-12 DIAGNOSIS — Z12.5 PROSTATE CANCER SCREENING: ICD-10-CM

## 2023-06-12 DIAGNOSIS — E78.5 HYPERLIPIDEMIA, UNSPECIFIED HYPERLIPIDEMIA TYPE: ICD-10-CM

## 2023-06-12 LAB
ALBUMIN SERPL BCP-MCNC: 4.1 G/DL (ref 3.5–5.2)
ALP SERPL-CCNC: 86 U/L (ref 55–135)
ALT SERPL W/O P-5'-P-CCNC: 19 U/L (ref 10–44)
ANION GAP SERPL CALC-SCNC: 5 MMOL/L (ref 8–16)
AST SERPL-CCNC: 22 U/L (ref 10–40)
BASOPHILS # BLD AUTO: 0.04 K/UL (ref 0–0.2)
BASOPHILS NFR BLD: 0.6 % (ref 0–1.9)
BILIRUB SERPL-MCNC: 1 MG/DL (ref 0.1–1)
BILIRUBIN, UA POC OHS: NEGATIVE
BLOOD, UA POC OHS: NEGATIVE
BUN SERPL-MCNC: 17 MG/DL (ref 8–23)
CALCIUM SERPL-MCNC: 8.9 MG/DL (ref 8.7–10.5)
CHLORIDE SERPL-SCNC: 106 MMOL/L (ref 95–110)
CHOLEST SERPL-MCNC: 105 MG/DL (ref 120–199)
CHOLEST/HDLC SERPL: 3.1 {RATIO} (ref 2–5)
CLARITY, UA POC OHS: CLEAR
CO2 SERPL-SCNC: 27 MMOL/L (ref 23–29)
COLOR, UA POC OHS: YELLOW
COMPLEXED PSA SERPL-MCNC: 1.2 NG/ML (ref 0–4)
CREAT SERPL-MCNC: 1.1 MG/DL (ref 0.5–1.4)
DIFFERENTIAL METHOD: ABNORMAL
EOSINOPHIL # BLD AUTO: 0.2 K/UL (ref 0–0.5)
EOSINOPHIL NFR BLD: 2.6 % (ref 0–8)
ERYTHROCYTE [DISTWIDTH] IN BLOOD BY AUTOMATED COUNT: 13.2 % (ref 11.5–14.5)
EST. GFR  (NO RACE VARIABLE): >60 ML/MIN/1.73 M^2
GLUCOSE SERPL-MCNC: 96 MG/DL (ref 70–110)
GLUCOSE, UA POC OHS: NEGATIVE
HCT VFR BLD AUTO: 41.3 % (ref 40–54)
HDLC SERPL-MCNC: 34 MG/DL (ref 40–75)
HDLC SERPL: 32.4 % (ref 20–50)
HGB BLD-MCNC: 13.9 G/DL (ref 14–18)
IMM GRANULOCYTES # BLD AUTO: 0.03 K/UL (ref 0–0.04)
IMM GRANULOCYTES NFR BLD AUTO: 0.4 % (ref 0–0.5)
KETONES, UA POC OHS: NEGATIVE
LDLC SERPL CALC-MCNC: 54.8 MG/DL (ref 63–159)
LEUKOCYTES, UA POC OHS: NEGATIVE
LYMPHOCYTES # BLD AUTO: 2.4 K/UL (ref 1–4.8)
LYMPHOCYTES NFR BLD: 34.7 % (ref 18–48)
MCH RBC QN AUTO: 31.7 PG (ref 27–31)
MCHC RBC AUTO-ENTMCNC: 33.7 G/DL (ref 32–36)
MCV RBC AUTO: 94 FL (ref 82–98)
MONOCYTES # BLD AUTO: 0.9 K/UL (ref 0.3–1)
MONOCYTES NFR BLD: 13.3 % (ref 4–15)
NEUTROPHILS # BLD AUTO: 3.3 K/UL (ref 1.8–7.7)
NEUTROPHILS NFR BLD: 48.4 % (ref 38–73)
NITRITE, UA POC OHS: NEGATIVE
NONHDLC SERPL-MCNC: 71 MG/DL
NRBC BLD-RTO: 0 /100 WBC
PH, UA POC OHS: 5.5
PLATELET # BLD AUTO: 172 K/UL (ref 150–450)
PMV BLD AUTO: 9.8 FL (ref 9.2–12.9)
POC RESIDUAL URINE VOLUME: 28 ML (ref 0–100)
POTASSIUM SERPL-SCNC: 3.9 MMOL/L (ref 3.5–5.1)
PROT SERPL-MCNC: 6.7 G/DL (ref 6–8.4)
PROTEIN, UA POC OHS: NEGATIVE
RBC # BLD AUTO: 4.38 M/UL (ref 4.6–6.2)
SODIUM SERPL-SCNC: 138 MMOL/L (ref 136–145)
SPECIFIC GRAVITY, UA POC OHS: >=1.03
TRIGL SERPL-MCNC: 81 MG/DL (ref 30–150)
TSH SERPL DL<=0.005 MIU/L-ACNC: 4.08 UIU/ML (ref 0.34–5.6)
UROBILINOGEN, UA POC OHS: 1
WBC # BLD AUTO: 6.86 K/UL (ref 3.9–12.7)

## 2023-06-12 PROCEDURE — 99213 OFFICE O/P EST LOW 20 MIN: CPT | Mod: PBBFAC,PO | Performed by: UROLOGY

## 2023-06-12 PROCEDURE — 80061 LIPID PANEL: CPT | Performed by: FAMILY MEDICINE

## 2023-06-12 PROCEDURE — 36415 COLL VENOUS BLD VENIPUNCTURE: CPT | Performed by: FAMILY MEDICINE

## 2023-06-12 PROCEDURE — 99205 OFFICE O/P NEW HI 60 MIN: CPT | Mod: S$PBB,,, | Performed by: UROLOGY

## 2023-06-12 PROCEDURE — 85025 COMPLETE CBC W/AUTO DIFF WBC: CPT | Performed by: FAMILY MEDICINE

## 2023-06-12 PROCEDURE — 81003 URINALYSIS AUTO W/O SCOPE: CPT | Mod: PBBFAC,PO | Performed by: UROLOGY

## 2023-06-12 PROCEDURE — 99999 PR PBB SHADOW E&M-EST. PATIENT-LVL III: ICD-10-PCS | Mod: PBBFAC,,, | Performed by: UROLOGY

## 2023-06-12 PROCEDURE — 99205 PR OFFICE/OUTPT VISIT, NEW, LEVL V, 60-74 MIN: ICD-10-PCS | Mod: S$PBB,,, | Performed by: UROLOGY

## 2023-06-12 PROCEDURE — 80053 COMPREHEN METABOLIC PANEL: CPT | Performed by: FAMILY MEDICINE

## 2023-06-12 PROCEDURE — 99999 PR PBB SHADOW E&M-EST. PATIENT-LVL III: CPT | Mod: PBBFAC,,, | Performed by: UROLOGY

## 2023-06-12 PROCEDURE — 84443 ASSAY THYROID STIM HORMONE: CPT | Performed by: FAMILY MEDICINE

## 2023-06-12 PROCEDURE — 84153 ASSAY OF PSA TOTAL: CPT | Performed by: FAMILY MEDICINE

## 2023-06-12 PROCEDURE — 51798 US URINE CAPACITY MEASURE: CPT | Mod: PBBFAC,PO | Performed by: UROLOGY

## 2023-06-12 RX ORDER — TAMSULOSIN HYDROCHLORIDE 0.4 MG/1
CAPSULE ORAL
Qty: 180 CAPSULE | Refills: 0
Start: 2023-06-12 | End: 2023-10-03 | Stop reason: SDUPTHER

## 2023-06-12 NOTE — PROGRESS NOTES
Electronically signed by: Robe Reyes MD on 06/12/23 1032 Status: Active   Ordering user: Robe Reyes MD 06/12/23 1032 Authorized by: Robe Reyes MD   Ordering mode: Standard   Frequency:  06/12/23 -     Diagnoses   BPH with obstruction/lower urinary tract symptoms [N40.1, N13.8]   Questionnaire    Question Answer   Procedure Cystoscopy Comment - 7/25   TRUS/Trans Rectal Ultrasound   Facility Name: [facility], Please order Urine POCT without micro . Local sedation

## 2023-06-12 NOTE — PATIENT INSTRUCTIONS
Discussed conservative measures to control urgency and frequency including   1. Avoiding/minimizing bladder irritants (see below), especially in afternoon and evening hours    Discussed bladder irritants include coffe (even decaf), tea, alcohol, soda, spicy foods, acidic juices (orange, tomato), vinegar, and artificial sweeteners/sugary beverages.    2. timed voiding - empty on a schedule (approx ~2-3 hours) in spite of need to urinate, to get ahead of urge    3. dont postponing voiding - dont hold it on purpose     4. bowel regimen as distended bowel has extrinsic compressive effect on bladder.   - any or all of the following in any combination, titrate to soft daily bowel movement without pushing or straining  - colace/stool softener capsule - once to twice daily  - miralax - 1 capful daily to start, can increase to 2x daily (or decrease to 1/2 cap daily)  - increase dietary fibery  - fiber supplements, such as metamucil  - prunes, prune juice    5. INCREASE water intake    6. Stop fluids 2 hours before bed, and urinate just before bed    7. Cystoscopy and prostate ultrasound at the [facility] on 7/25/23 in the afternoon.  Will get call day prior or Friday prior with afternoon arrival time.  No prep or fasting.

## 2023-06-14 ENCOUNTER — TELEPHONE (OUTPATIENT)
Dept: FAMILY MEDICINE | Facility: CLINIC | Age: 73
End: 2023-06-14
Payer: MEDICARE

## 2023-06-15 NOTE — TELEPHONE ENCOUNTER
----- Message from Priyanka Rievr sent at 6/14/2023  2:17 PM CDT -----  Contact: pt  Type:  Patient Returning Call    Who Called:  pt  Who Left Message for Patient:  Baylee  Does the patient know what this is regarding?:  n/a  Best Call Back Number:  [phone]    Additional Information:  please give pt a call back

## 2023-07-07 ENCOUNTER — TELEPHONE (OUTPATIENT)
Dept: FAMILY MEDICINE | Facility: CLINIC | Age: 73
End: 2023-07-07
Payer: MEDICARE

## 2023-07-07 ENCOUNTER — PATIENT MESSAGE (OUTPATIENT)
Dept: FAMILY MEDICINE | Facility: CLINIC | Age: 73
End: 2023-07-07
Payer: MEDICARE

## 2023-07-25 ENCOUNTER — HOSPITAL ENCOUNTER (OUTPATIENT)
Facility: HOSPITAL | Age: 73
Discharge: HOME | End: 2023-07-25
Attending: UROLOGY | Admitting: UROLOGY
Payer: MEDICARE

## 2023-07-25 VITALS
SYSTOLIC BLOOD PRESSURE: 144 MMHG | BODY MASS INDEX: 36.58 KG/M2 | DIASTOLIC BLOOD PRESSURE: 72 MMHG | HEART RATE: 61 BPM | HEIGHT: 72 IN | OXYGEN SATURATION: 96 % | WEIGHT: 270.06 LBS | TEMPERATURE: 97 F | RESPIRATION RATE: 17 BRPM

## 2023-07-25 DIAGNOSIS — N40.1 BPH WITH OBSTRUCTION/LOWER URINARY TRACT SYMPTOMS: ICD-10-CM

## 2023-07-25 DIAGNOSIS — N13.8 BPH WITH OBSTRUCTION/LOWER URINARY TRACT SYMPTOMS: ICD-10-CM

## 2023-07-25 LAB
BILIRUBIN, UA POC OHS: NEGATIVE
BLOOD, UA POC OHS: ABNORMAL
CLARITY, UA POC OHS: CLEAR
COLOR, UA POC OHS: YELLOW
GLUCOSE, UA POC OHS: NEGATIVE
KETONES, UA POC OHS: NEGATIVE
LEUKOCYTES, UA POC OHS: NEGATIVE
NITRITE, UA POC OHS: NEGATIVE
PH, UA POC OHS: 5.5
PROTEIN, UA POC OHS: ABNORMAL
SPECIFIC GRAVITY, UA POC OHS: >=1.03
UROBILINOGEN, UA POC OHS: 0.2

## 2023-07-25 PROCEDURE — 76872 PR US TRANSRECTAL: ICD-10-PCS | Mod: 26,,, | Performed by: UROLOGY

## 2023-07-25 PROCEDURE — 76872 US TRANSRECTAL: CPT | Mod: 26,,, | Performed by: UROLOGY

## 2023-07-25 PROCEDURE — 52000 CYSTOURETHROSCOPY: CPT | Mod: ,,, | Performed by: UROLOGY

## 2023-07-25 PROCEDURE — 52000 PR CYSTOURETHROSCOPY: ICD-10-PCS | Mod: ,,, | Performed by: UROLOGY

## 2023-07-25 PROCEDURE — 25000003 PHARM REV CODE 250: Performed by: UROLOGY

## 2023-07-25 PROCEDURE — 76872 US TRANSRECTAL: CPT | Performed by: UROLOGY

## 2023-07-25 PROCEDURE — 52000 CYSTOURETHROSCOPY: CPT | Performed by: UROLOGY

## 2023-07-25 RX ORDER — CIPROFLOXACIN 500 MG/1
500 TABLET ORAL 2 TIMES DAILY
Qty: 4 TABLET | Refills: 0 | Status: SHIPPED | OUTPATIENT
Start: 2023-07-25 | End: 2023-07-27

## 2023-07-25 RX ORDER — LIDOCAINE HYDROCHLORIDE 20 MG/ML
JELLY TOPICAL
Status: DISCONTINUED | OUTPATIENT
Start: 2023-07-25 | End: 2023-07-25 | Stop reason: HOSPADM

## 2023-07-25 NOTE — H&P
[facility] Urology New Patient/H&P:      Aaron Ross is a 73 y.o. male who presents for BPH evaluation at referral of Dr Lemon     Last seen by Dr Lemon on 6/6/23 noting  Obstructive sleep apnea using his BiPAP regularly.  BPH with nocturia.  PSA 1.1.  Check is due again.  Nocturia 2 times a night.  Status post right knee replacement. On aricept, refilled. As well had been on flomax 0.4 and was advised to increase to 0.8mg  Recent ER visit 5/23/23 for contusion of foot.  Notes he has a hairline fracture.     He presents today noting  Has not increased dose of flomax yet  Has been on it about 3 years, and initially worked well.  Now does not.  Also been on aricept about 4 years.   Father had prostate cancer diagnosed late in his 70s or 80s and lived into his 90s. He did have radiation though and complications from it.  He did have bladder ultrasound in 2/2020 noting: The prevoid bladder volume is 330 cubic cm.  The postvoid bladder volume is 60 cubic cm.  The prostate gland measures 3.8 x 4.0 x 3.6 cm.  - Pt reports that he did void 3 different times before getting to that PVR noting to empty about a 3rd each time.  Before the final was documented.  AUA SS:  23/4 (5: Emptying, intermittency; 4: Weak stream; 3: Frequency, straining; 2: Sleeping; 1: Urgency).  Medication helps 5/10.  Again noting that it helps a lot at 1st but the effects have diminished.  Feels the incomplete emptying b  He did have updated labs this AM from primary care:  Today: PSA 1.2, remainder as below.   0-2 soft drinks per day. When increased to 2, urgency worse, so replaced with tea which is better from urgency standpoint. But went to water.  Would like to be off bp meds, notes has had issues at times with it being low.          Past Medical History:   Diagnosis Date    Arthritis      BMI 36.0-36.9,adult 6/25/2018    Sleep apnea       USES CPAP    Thyroid disease      Varicose veins of both lower extremities      Wears glasses                  Past Surgical History:   Procedure Laterality Date    BACK SURGERY         PAPAYA JUICE INJECTION    FINGER MASS EXCISION Right 01/19/2023     Procedure: Right small finger mucoid cyst excision;  Surgeon: Anthony Edwards MD;  Location: [facility] OR;  Service: Orthopedics;  Laterality: Right;    JOINT REPLACEMENT Right 10/01/2012    KNEE SURGERY   2009    THYROID SURGERY   2013     PARTIAL    UVULA   2000     REMOVED FOR SLEEP APNEA         History reviewed. No pertinent family history.     Social History               Socioeconomic History    Marital status:    Occupational History    Occupation: RETIRED   Tobacco Use    Smoking status: Never    Smokeless tobacco: Never   Substance and Sexual Activity    Alcohol use: Yes       Comment: RARELY    Drug use: No    Sexual activity: Yes       Partners: Female                  Review of patient's allergies indicates:   Allergen Reactions    Adhesive tape-silicones Rash       Allergy to adhesive on gauze - paper & regular tape ok         Medications Reviewed: see MAR     Focused Physical Exam         Vitals:     06/12/23 0904   BP: 126/66   Pulse: 62      Body mass index is 36.62 kg/m². Weight: 122.5 kg (270 lb) Height: 6' (182.9 cm)         Abdomen: Soft, non-tender, nondistended, no CVA tenderness  : deferred to time of prostate biopsy        LABS:     Recent Results         Recent Results (from the past 336 hour(s))   CBC Auto Differential     Collection Time: 06/12/23  8:08 AM   Result Value Ref Range     WBC 6.86 3.90 - 12.70 K/uL     RBC 4.38 (L) 4.60 - 6.20 M/uL     Hemoglobin 13.9 (L) 14.0 - 18.0 g/dL     Hematocrit 41.3 40.0 - 54.0 %     MCV 94 82 - 98 fL     MCH 31.7 (H) 27.0 - 31.0 pg     MCHC 33.7 32.0 - 36.0 g/dL     RDW 13.2 11.5 - 14.5 %     Platelets 172 150 - 450 K/uL     MPV 9.8 9.2 - 12.9 fL     Immature Granulocytes 0.4 0.0 - 0.5 %     Gran # (ANC) 3.3 1.8 - 7.7 K/uL     Immature Grans (Abs) 0.03 0.00 - 0.04 K/uL     Lymph # 2.4 1.0 - 4.8  K/uL     Mono # 0.9 0.3 - 1.0 K/uL     Eos # 0.2 0.0 - 0.5 K/uL     Baso # 0.04 0.00 - 0.20 K/uL     nRBC 0 0 /100 WBC     Gran % 48.4 38.0 - 73.0 %     Lymph % 34.7 18.0 - 48.0 %     Mono % 13.3 4.0 - 15.0 %     Eosinophil % 2.6 0.0 - 8.0 %     Basophil % 0.6 0.0 - 1.9 %     Differential Method Automated     Comprehensive Metabolic Panel     Collection Time: 06/12/23  8:08 AM   Result Value Ref Range     Sodium 138 136 - 145 mmol/L     Potassium 3.9 3.5 - 5.1 mmol/L     Chloride 106 95 - 110 mmol/L     CO2 27 23 - 29 mmol/L     Glucose 96 70 - 110 mg/dL     BUN 17 8 - 23 mg/dL     Creatinine 1.1 0.5 - 1.4 mg/dL     Calcium 8.9 8.7 - 10.5 mg/dL     Total Protein 6.7 6.0 - 8.4 g/dL     Albumin 4.1 3.5 - 5.2 g/dL     Total Bilirubin 1.0 0.1 - 1.0 mg/dL     Alkaline Phosphatase 86 55 - 135 U/L     AST 22 10 - 40 U/L     ALT 19 10 - 44 U/L     Anion Gap 5 (L) 8 - 16 mmol/L     eGFR >60.0 >60 mL/min/1.73 m^2   Lipid Panel     Collection Time: 06/12/23  8:08 AM   Result Value Ref Range     Cholesterol 105 (L) 120 - 199 mg/dL     Triglycerides 81 30 - 150 mg/dL     HDL 34 (L) 40 - 75 mg/dL     LDL Cholesterol 54.8 (L) 63.0 - 159.0 mg/dL     HDL/Cholesterol Ratio 32.4 20.0 - 50.0 %     Total Cholesterol/HDL Ratio 3.1 2.0 - 5.0     Non-HDL Cholesterol 71 mg/dL   PSA, Screening     Collection Time: 06/12/23  8:08 AM   Result Value Ref Range     PSA, Screen 1.2 0.00 - 4.00 ng/mL   TSH     Collection Time: 06/12/23  8:08 AM   Result Value Ref Range     TSH 4.080 0.340 - 5.600 uIU/mL   POCT Bladder Scan     Collection Time: 06/12/23  9:17 AM   Result Value Ref Range     POC Residual Urine Volume 28 0 - 100 mL   POCT Urinalysis(Instrument)     Collection Time: 06/12/23  9:41 AM   Result Value Ref Range     Color, POC UA Yellow Yellow, Straw, Colorless     Clarity, POC UA Clear Clear     Glucose, POC UA Negative Negative     Bilirubin, POC UA Negative Negative     Ketones, POC UA Negative Negative     Spec Grav POC UA >=1.030  1.005 - 1.030     Blood, POC UA Negative Negative     pH, POC UA 5.5 5.0 - 8.0     Protein, POC UA Negative Negative     Urobilinogen, POC UA 1.0 <=1.0     Nitrite, POC UA Negative Negative     WBC, POC UA Negative Negative               Assessment/Diagnosis:     1. BPH with obstruction/lower urinary tract symptoms  Ambulatory referral/consult to Urology     POCT Urinalysis(Instrument)     POCT Bladder Scan             Plans:  Extensively reviewed medical history including workup and management by primary care as well as his lab work and normal PSA history and medical management of his obstructive lower urinary tract symptoms.  We did have a discussion about the natural history of BPH with obstructive symptoms, progressing even despite medication secondary to anatomic considerations, as well as the changes at the bladder level of chronic obstruction which can contribute to his urgency and frequency symptoms as well as trouble emptying.  He certainly does have all of these and severe symptoms despite Flomax 0.4 mg nightly, and we did discuss the indication to double the dose, however he is not started this yet and on review of his history I recommended that he do not double the dose.  We did review the new literature about the contributions of alpha-blocker such as Flomax to dementia and memory changes and he is already on Aricept and has been on it for a few years and therefore the risks outweigh the benefits in his case, especially with long-term alpha blocker with initial response, and now severe symptoms, would likely benefit from some form of BPH intervention to relieve prostate obstruction and allow him to discontinue medical therapy, especially with the likely changes of chronic obstruction contributing to his irritative symptoms.     We reviewed lower tract evaluation with cystoscopy and prostate ultrasound to help guide further recommendations.  Noted volumetric measurement of his prostate transabdominally  was done in 2020 with a bladder scan PVR at that time.  He is emptying well today, but does still have sensation of incomplete emptying and does void multiple times to empty, so should continue his alpha blocker for now pending workup, with goal of discontinuing medical therapy in the future.  Procedures in detail of diagnostic flexible cystourethroscopy including the use of local anesthesia with xylocaine jelly as well as transrectal ultrasound-guided volumetric measurement of the prostate were described to the patient in detail and he is agreeable to proceed after all questions were answered.  This was scheduled at the [facility] on 7/25/23.  His PSA is up-to-date and normal and stable as of today with primary care and we did discuss routine prostate cancer screening recommendations from 50-70, noting that a normal PSA in the 1 range at age 73 carries with it a less than 2% risk of clinically significant prostate cancer in his lifetime.  We did review that family history in a first-degree relative does technically increase risk, however his father was diagnosed late in his 80s and discussed a bit about the natural history of prostate cancer with aging and when routine screening is recommended and can likely discontinue routine prostate cancer screening and focus on the workup and management of his BPH and obstructive lower urinary tract symptoms.  He was agreeable.    All conservative recommendations for urgency and frequency were provided in writing and reviewed with the patient as well.  He can focus at this time on continuing to eliminate bladder irritants which he already has, as well as timed voiding to facilitate better bladder emptying, pending further evaluation.     Cysto/trus 7/25  Manish at time of workup, with normal PSA history    10 pt ros neg

## 2023-07-26 DIAGNOSIS — N13.8 ENLARGED PROSTATE WITH URINARY OBSTRUCTION: ICD-10-CM

## 2023-07-26 DIAGNOSIS — N40.1 ENLARGED PROSTATE WITH URINARY OBSTRUCTION: ICD-10-CM

## 2023-07-26 NOTE — PROGRESS NOTES
Procedure Order to Urology [[id number]]    Electronically signed by: Robe Reyes MD on 07/25/23 1616 Status: Active   Ordering user: Robe Reyes MD 07/25/23 1616 Ordering provider: Robe Reyes MD   Authorized by: Robe Reyes MD Ordering mode: Standard   Frequency:  07/25/23 -     Diagnoses   BPH with obstruction/lower urinary tract symptoms [N40.1, N13.8]   Questionnaire    Question Answer   Procedure Urolift Comment - 9/14   Facility Name: [facility], please order, CBC, BMP, PT/ INR ,U/A and culture ,EKG if over 40  IV start, NPO, general anesthesia, Ancef 2 gram ( alternative for pcn allergy)

## 2023-07-29 NOTE — OP NOTE
[facility] Urology Operative/Brief Discharge Note     Date: 7/25/23     Staff Surgeon: Robe Reyes MD     Pre-Op Diagnosis:   BPH with LUTS     Post-Op Diagnosis:   same     Procedure(s) Performed:   Cystoscopy, flexible  Transrectal ultrasound with volumetric measurement of prostate     Specimen(s): none     Anesthesia: local, 2% xylocaine jelly urojet     Findings:   Volume 50.02 cm3 (W 44.87mm, H 39.43 mm, L 54.06mm), no med lobe, mild  PVR  Cysto:  significant lateral lobe obstruction, more anterolateral proximally, kissing lobes distally, Mild impression at the base of the bladder without any intravesical extension and with moderate diffuse trabeculations and sparse scattered early cellules      Estimated Blood Loss: none     Drains: none     Complications: none     Indications for procedure:  72yo M with history of progressive BPH/LUTS, despite Flomax 0.4 mg.  He has been followed medically managed by primary care who recently recommended increased dose, but before doing so presented for evaluation noting in 2020 a PVR of 60 cc by bladder scan but had to void 3 different times to get that PVR, and despite Flomax had IPSS 23/4 with predominantly obstructive symptoms, noting medication helped at 1st but symptoms have progressed and effect of medication has diminished.  As well he has some early memory deficit for which he is on Aricept.  Increase Flomax dose in the interim pending evaluation today, but presents for lower tract evaluation to help guide further recommendations for better relief of symptoms in the absence of medical management, especially given the risk to him of alpha blockers with progressive memory decline.     Procedure in detail:  After informed consent, the patient was placed in left lateral decubitus position. Transrectal ultrasound probe was passed into the rectum and the prostate was visualized on the screen.  Three-dimensional measurements taken as above with reported prostate volume as  documented.  Pertinent ultrasound findings noted above, with absence of median lobe, and No ultrasonic abnormalities of prostate were visualized. Transverse and saggital image captured.  The ultrasound probe was removed     He was then placed in supine position and prepped and drapped in standard cystoscopic fashion and 2% xylocaine jelly was instilled into the urethra.  A flexible cystoscope was passed into the bladder via the urethra.      Anterior urethra normal without narrowing. The prostatic urethra demonstrated significant obstruction as described above in findings, with lateral lobe obstruction present with the absence of median lobe, as confirmed on retroflexion      Bladder otherwise systematically inspected and no mucosal lesions or tumors seen.  Bladder was also assessed for signs of chronic obstruction such as trabeculations, cellules, diverticulum, of which pertinent findings are noted above with extensive signs of chronic obstruction.  Bilateral ureteral orifices seen in orthotopic position on trigone bilaterally with clear efflux.       Patient tolerated the procedure well. No complications     Disposition:  He does have moderate obstructive lower urinary tract symptoms despite alpha-blocker, Uroxatral, which he is able to tolerate but was unable to tolerate alternative Flomax due to dizziness. Reviewed adding finasteride given gland size >40g but unlikely to benefit given significant obstruction, and goal of discontinuing Flomax given dementia risk and therefore would not be utilized as monotherapy, and therefore discussed options for intervention for his medication refractory LUTS. Discussed options for intervention such as urolift, rezum, tuip, turp. He is interested in proceeding with Urolift after discussion of all risks, benefits, and alternatives. Especially given gland size and anatomic proportions and minimal symptom profile with quick return to activity.     Procedure in detail discussed.  Discussed risks including but not limited to hematuria, postop retention, pain, infection, injury to bladder or urethra, and worsening urgency, latent pelvic pain, no guarantee of symptomatic relief, prostate regrowth, need for future procedures. We did discuss the non-incidence of ejaculatory dysfunction, as well as the 1-2% retreatment rate in 5 year studies. Also discussed about 20% of people may need a catheter after (due to retention or hematuria) but not required if voiding postop, though tend to leave ferreira prophylactically overnight, which he is agreeable to.     Discussed that symptomatic relief may take longer to be fully appreciated, in the setting of longer standing obstruction, and period of symptomatic adjustment postop. Also discussed transient increases in urgency frequency which may yield transient UUI in postop period.     All questions patient had answered and appropriate informed consent obtained.  Urolift in OR 9/14/23 per pt preference with summer travel plans  He did note a remote cardiac workup, but is followed closely by primary care was recently seen last month noting all condition stable.  Will preop 2 weeks in advance to make sure no concerns on preop labs workup and EKG before proceeding.     Discharge home today status post uncomplicated procedure as above  Diet - resume home diet  Follow up: urolift 9/14/23  Instructions:  Drink plenty of water, may see blood in urine, complete prophylactic antibiotics.  Hold asa/fish oil/bloodthinners 1 week prior to urolift  Meds:     Medication List        CONTINUE taking these medications      aspirin 81 MG EC tablet  Commonly known as: ECOTRIN     donepeziL 10 MG tablet  Commonly known as: ARICEPT  Take 1 tablet (10 mg total) by mouth once daily.     irbesartan 75 MG tablet  Commonly known as: AVAPRO  Take 1 tablet (75 mg total) by mouth once daily.     ketoconazole 2 % shampoo  Commonly known as: NIZORAL  Apply 1 Applicatorful topically every  other day.     tamsulosin 0.4 mg Cap  Commonly known as: FLOMAX  nightly            STOP taking these medications      pravastatin 20 MG tablet  Commonly known as: PRAVACHOL            ASK your doctor about these medications      ciprofloxacin HCl 500 MG tablet  Commonly known as: CIPRO  Take 1 tablet (500 mg total) by mouth 2 (two) times daily. for 2 days  Ask about: Should I take this medication?               Where to Get Your Medications        These medications were sent to [id number] - [place], [place] - [address] AT [facility] [address]   [address] [address] [address], [place] [address]      Phone: [phone]   ciprofloxacin HCl 500 MG tablet

## 2023-09-01 ENCOUNTER — HOSPITAL ENCOUNTER (OUTPATIENT)
Dept: PREADMISSION TESTING | Facility: HOSPITAL | Age: 73
Discharge: HOME | End: 2023-09-01
Attending: UROLOGY
Payer: MEDICARE

## 2023-09-01 VITALS — HEIGHT: 72 IN | WEIGHT: 270 LBS | BODY MASS INDEX: 36.57 KG/M2

## 2023-09-01 DIAGNOSIS — Z01.818 PREOP EXAMINATION: ICD-10-CM

## 2023-09-01 DIAGNOSIS — N40.1 ENLARGED PROSTATE WITH URINARY OBSTRUCTION: ICD-10-CM

## 2023-09-01 DIAGNOSIS — N13.8 ENLARGED PROSTATE WITH URINARY OBSTRUCTION: ICD-10-CM

## 2023-09-01 PROCEDURE — 93005 ELECTROCARDIOGRAM TRACING: CPT

## 2023-09-01 PROCEDURE — 93010 ELECTROCARDIOGRAM REPORT: CPT | Mod: ,,, | Performed by: GENERAL PRACTICE

## 2023-09-01 PROCEDURE — 93010 EKG 12-LEAD: ICD-10-PCS | Mod: ,,, | Performed by: GENERAL PRACTICE

## 2023-09-01 NOTE — DISCHARGE INSTRUCTIONS
To confirm, Your doctor has instructed you that surgery is scheduled for:     Please report to [facility], Registration the morning of surgery. You must check-in and receive a wristband before going to your procedure.  [address] [place] [address] [place] [address]    Pre-Op will call the afternoon prior to surgery between 1:00 and 6:00 PM with the final arrival time.  Phone number: [phone]    PLEASE NOTE:  The surgery schedule has many variables which may affect the time of your surgery case.  Family members should be available if your surgery time changes.  Plan to be here the day of your procedure between 4-6 hours.    MEDICATIONS:  TAKE ONLY THESE MEDICATIONS WITH A SMALL SIP OF WATER THE MORNING OF YOUR PROCEDURE:      DO NOT TAKE THESE MEDICATIONS 5-7 DAYS PRIOR to your procedure or per your surgeon's request:   ASPIRIN, ALEVE, ADVIL, IBUPROFEN, FISH OIL VITAMIN E, HERBALS  (May take Tylenol)    ONLY if you are prescribed any types of blood thinners such as:  Aspirin, Coumadin, Plavix, Pradaxa, Xarelto, Aggrenox, Effient, Eliquis, Savasya, Brilinta, or any other, ask your surgeon whether you should stop taking them and how long before surgery you should stop.  You may also need to verify with the prescribing physician if it is ok to stop your medication.      INSTRUCTIONS IMPORTANT!!  Do not eat or drink anything between midnight and the time of your procedure- this includes gum, mints, and candy.  Do not smoke or drink alcoholic beverages 24 hours prior to your procedure.  Shower the night before AND the morning of your procedure with a Chlorhexidine wash such as Hibiclens or Dial antibacterial soap from the neck down.  Do not get it on your face or in your eyes.  You may use your own shampoo and face wash. This helps your skin to be as bacteria free as possible.    If you wear contact lenses, dentures, hearing aids or glasses, bring a container to put them in during surgery and give to a  family member for safe keeping.  Please leave all jewelry, piercing's and valuables at home. You must remove your false eyelashes prior to surgery.    DO NOT remove hair from the surgery site.  Do not shave the incision site unless you are given specific instructions to do so.    ONLY if you have been diagnosed with sleep apnea please bring your C-PAP machine.  ONLY if you wear home oxygen please bring your portable oxygen tank the day of your procedure.  ONLY if you have a history of OPEN HEART SURGERY you will need a clearance from your Cardiologist per Anesthesia.      ONLY for patients requiring bowel prep, written instructions will be given by your doctor's office.  ONLY if you have a neuro stimulator, please bring the controller with you the morning of surgery  ONLY if a type and screen test is needed before surgery, please return:  If your doctor has scheduled you for an overnight stay, bring a small overnight bag with any personal items you need.  Make arrangements in advance for transportation home by a responsible adult.  It is not safe to drive a vehicle during the 24 hours after anesthesia.          All  facilities and properties are tobacco free.  Smoking is NOT allowed.   If you have any questions about these instructions, call Pre-Op Admit  Nursing at [phone] or the Pre-Op Day Surgery Unit at [phone].

## 2023-09-07 ENCOUNTER — PATIENT MESSAGE (OUTPATIENT)
Dept: SURGERY | Facility: HOSPITAL | Age: 73
End: 2023-09-07

## 2023-09-13 ENCOUNTER — ANESTHESIA EVENT (OUTPATIENT)
Dept: SURGERY | Facility: HOSPITAL | Age: 73
End: 2023-09-13
Payer: MEDICARE

## 2023-09-14 ENCOUNTER — HOSPITAL ENCOUNTER (OUTPATIENT)
Facility: HOSPITAL | Age: 73
Discharge: HOME | End: 2023-09-14
Attending: UROLOGY | Admitting: UROLOGY
Payer: MEDICARE

## 2023-09-14 ENCOUNTER — ANESTHESIA (OUTPATIENT)
Dept: SURGERY | Facility: HOSPITAL | Age: 73
End: 2023-09-14
Payer: MEDICARE

## 2023-09-14 DIAGNOSIS — N13.8 ENLARGED PROSTATE WITH URINARY OBSTRUCTION: ICD-10-CM

## 2023-09-14 DIAGNOSIS — N40.1 ENLARGED PROSTATE WITH URINARY OBSTRUCTION: ICD-10-CM

## 2023-09-14 PROCEDURE — 52442 PR CYSTO W/INSERT PERMANENT ADJ IMPLANT, EA ADDTL IMPLANT: ICD-10-PCS | Mod: ,,, | Performed by: UROLOGY

## 2023-09-14 PROCEDURE — 52442 CYSTO INS TRNSPRSTC IMPLT EA: CPT | Mod: ,,, | Performed by: UROLOGY

## 2023-09-14 PROCEDURE — 36000707: Performed by: UROLOGY

## 2023-09-14 PROCEDURE — 37000008 HC ANESTHESIA 1ST 15 MINUTES: Performed by: UROLOGY

## 2023-09-14 PROCEDURE — D9220A PRA ANESTHESIA: ICD-10-PCS | Mod: ANES,,, | Performed by: ANESTHESIOLOGY

## 2023-09-14 PROCEDURE — 63600175 PHARM REV CODE 636 W HCPCS: Performed by: UROLOGY

## 2023-09-14 PROCEDURE — 52441 CYSTO INSJ TRNSPRSTC 1 IMPLT: CPT | Mod: ,,, | Performed by: UROLOGY

## 2023-09-14 PROCEDURE — 52441 PR CYSTO W/INSERT PERMANENT ADJ IMPLANT, SINGLE: ICD-10-PCS | Mod: ,,, | Performed by: UROLOGY

## 2023-09-14 PROCEDURE — 25000003 PHARM REV CODE 250: Performed by: ANESTHESIOLOGY

## 2023-09-14 PROCEDURE — 71000015 HC POSTOP RECOV 1ST HR: Performed by: UROLOGY

## 2023-09-14 PROCEDURE — L8699 PROSTHETIC IMPLANT NOS: HCPCS | Performed by: UROLOGY

## 2023-09-14 PROCEDURE — 71000039 HC RECOVERY, EACH ADD'L HOUR: Performed by: UROLOGY

## 2023-09-14 PROCEDURE — 99900031 HC PATIENT EDUCATION (STAT)

## 2023-09-14 PROCEDURE — 37000009 HC ANESTHESIA EA ADD 15 MINS: Performed by: UROLOGY

## 2023-09-14 PROCEDURE — D9220A PRA ANESTHESIA: Mod: ANES,,, | Performed by: ANESTHESIOLOGY

## 2023-09-14 PROCEDURE — D9220A PRA ANESTHESIA: ICD-10-PCS | Mod: CRNA,,, | Performed by: NURSE ANESTHETIST, CERTIFIED REGISTERED

## 2023-09-14 PROCEDURE — 94799 UNLISTED PULMONARY SVC/PX: CPT

## 2023-09-14 PROCEDURE — 25000003 PHARM REV CODE 250: Performed by: NURSE ANESTHETIST, CERTIFIED REGISTERED

## 2023-09-14 PROCEDURE — D9220A PRA ANESTHESIA: Mod: CRNA,,, | Performed by: NURSE ANESTHETIST, CERTIFIED REGISTERED

## 2023-09-14 PROCEDURE — 71000033 HC RECOVERY, INTIAL HOUR: Performed by: UROLOGY

## 2023-09-14 PROCEDURE — 36000706: Performed by: UROLOGY

## 2023-09-14 PROCEDURE — 63600175 PHARM REV CODE 636 W HCPCS: Performed by: NURSE ANESTHETIST, CERTIFIED REGISTERED

## 2023-09-14 DEVICE — CARTRIDGE UROLIFT 2 IMPLANT: Type: IMPLANTABLE DEVICE | Site: PROSTATE | Status: FUNCTIONAL

## 2023-09-14 RX ORDER — LIDOCAINE HYDROCHLORIDE 10 MG/ML
1 INJECTION, SOLUTION EPIDURAL; INFILTRATION; INTRACAUDAL; PERINEURAL ONCE
Status: DISCONTINUED | OUTPATIENT
Start: 2023-09-14 | End: 2023-09-14 | Stop reason: HOSPADM

## 2023-09-14 RX ORDER — FENTANYL CITRATE 50 UG/ML
25 INJECTION, SOLUTION INTRAMUSCULAR; INTRAVENOUS EVERY 5 MIN PRN
Status: DISCONTINUED | OUTPATIENT
Start: 2023-09-14 | End: 2023-09-14 | Stop reason: HOSPADM

## 2023-09-14 RX ORDER — HYDROMORPHONE HYDROCHLORIDE 2 MG/ML
0.2 INJECTION, SOLUTION INTRAMUSCULAR; INTRAVENOUS; SUBCUTANEOUS EVERY 5 MIN PRN
Status: DISCONTINUED | OUTPATIENT
Start: 2023-09-14 | End: 2023-09-14 | Stop reason: HOSPADM

## 2023-09-14 RX ORDER — PHENAZOPYRIDINE HYDROCHLORIDE 200 MG/1
200 TABLET, FILM COATED ORAL 3 TIMES DAILY PRN
Qty: 15 TABLET | Refills: 0 | Status: SHIPPED | OUTPATIENT
Start: 2023-09-14 | End: 2023-09-24

## 2023-09-14 RX ORDER — METOCLOPRAMIDE HYDROCHLORIDE 5 MG/ML
10 INJECTION INTRAMUSCULAR; INTRAVENOUS EVERY 10 MIN PRN
Status: DISCONTINUED | OUTPATIENT
Start: 2023-09-14 | End: 2023-09-14 | Stop reason: HOSPADM

## 2023-09-14 RX ORDER — DEXAMETHASONE SODIUM PHOSPHATE 4 MG/ML
INJECTION, SOLUTION INTRA-ARTICULAR; INTRALESIONAL; INTRAMUSCULAR; INTRAVENOUS; SOFT TISSUE
Status: DISCONTINUED | OUTPATIENT
Start: 2023-09-14 | End: 2023-09-14

## 2023-09-14 RX ORDER — SULFAMETHOXAZOLE AND TRIMETHOPRIM 800; 160 MG/1; MG/1
1 TABLET ORAL 2 TIMES DAILY
Qty: 10 TABLET | Refills: 0 | Status: SHIPPED | OUTPATIENT
Start: 2023-09-14 | End: 2023-09-19

## 2023-09-14 RX ORDER — ONDANSETRON HYDROCHLORIDE 2 MG/ML
INJECTION, SOLUTION INTRAMUSCULAR; INTRAVENOUS
Status: DISCONTINUED | OUTPATIENT
Start: 2023-09-14 | End: 2023-09-14

## 2023-09-14 RX ORDER — FENTANYL CITRATE 50 UG/ML
INJECTION, SOLUTION INTRAMUSCULAR; INTRAVENOUS
Status: DISCONTINUED | OUTPATIENT
Start: 2023-09-14 | End: 2023-09-14

## 2023-09-14 RX ORDER — CEFAZOLIN SODIUM 2 G/50ML
2 SOLUTION INTRAVENOUS
Status: COMPLETED | OUTPATIENT
Start: 2023-09-14 | End: 2023-09-14

## 2023-09-14 RX ORDER — OXYCODONE HYDROCHLORIDE 5 MG/1
5 TABLET ORAL
Status: DISCONTINUED | OUTPATIENT
Start: 2023-09-14 | End: 2023-09-14 | Stop reason: HOSPADM

## 2023-09-14 RX ORDER — PROPOFOL 10 MG/ML
VIAL (ML) INTRAVENOUS
Status: DISCONTINUED | OUTPATIENT
Start: 2023-09-14 | End: 2023-09-14

## 2023-09-14 RX ORDER — EPHEDRINE SULFATE 50 MG/ML
INJECTION, SOLUTION INTRAVENOUS
Status: DISCONTINUED | OUTPATIENT
Start: 2023-09-14 | End: 2023-09-14

## 2023-09-14 RX ORDER — LIDOCAINE HYDROCHLORIDE 20 MG/ML
INJECTION INTRAVENOUS
Status: DISCONTINUED | OUTPATIENT
Start: 2023-09-14 | End: 2023-09-14

## 2023-09-14 RX ADMIN — SODIUM CHLORIDE, SODIUM GLUCONATE, SODIUM ACETATE, POTASSIUM CHLORIDE AND MAGNESIUM CHLORIDE: 526; 502; 368; 37; 30 INJECTION, SOLUTION INTRAVENOUS at 09:09

## 2023-09-14 RX ADMIN — SODIUM CHLORIDE, SODIUM GLUCONATE, SODIUM ACETATE, POTASSIUM CHLORIDE AND MAGNESIUM CHLORIDE: 526; 502; 368; 37; 30 INJECTION, SOLUTION INTRAVENOUS at 01:09

## 2023-09-14 RX ADMIN — EPHEDRINE SULFATE 25 MG: 50 INJECTION, SOLUTION INTRAMUSCULAR; INTRAVENOUS; SUBCUTANEOUS at 11:09

## 2023-09-14 RX ADMIN — FENTANYL CITRATE 50 MCG: 50 INJECTION, SOLUTION INTRAMUSCULAR; INTRAVENOUS at 10:09

## 2023-09-14 RX ADMIN — SODIUM CHLORIDE, SODIUM GLUCONATE, SODIUM ACETATE, POTASSIUM CHLORIDE AND MAGNESIUM CHLORIDE: 526; 502; 368; 37; 30 INJECTION, SOLUTION INTRAVENOUS at 11:09

## 2023-09-14 RX ADMIN — DEXAMETHASONE SODIUM PHOSPHATE 4 MG: 4 INJECTION, SOLUTION INTRA-ARTICULAR; INTRALESIONAL; INTRAMUSCULAR; INTRAVENOUS; SOFT TISSUE at 11:09

## 2023-09-14 RX ADMIN — PROPOFOL 150 MG: 10 INJECTION, EMULSION INTRAVENOUS at 10:09

## 2023-09-14 RX ADMIN — LIDOCAINE HYDROCHLORIDE 100 MG: 20 INJECTION, SOLUTION INTRAVENOUS at 10:09

## 2023-09-14 RX ADMIN — CEFAZOLIN SODIUM 2 G: 2 SOLUTION INTRAVENOUS at 11:09

## 2023-09-14 RX ADMIN — FENTANYL CITRATE 50 MCG: 50 INJECTION, SOLUTION INTRAMUSCULAR; INTRAVENOUS at 11:09

## 2023-09-14 RX ADMIN — ONDANSETRON 4 MG: 2 INJECTION INTRAMUSCULAR; INTRAVENOUS at 11:09

## 2023-09-14 NOTE — PLAN OF CARE
Reviewed discharge instructions, patient states understanding, Educated patient when to notify MD, and post anesthesia precautions, reviewed medications administration with patient, RX to pharmacy, IV removed as ordered, patient voiding via catheter, post op appointment scheduled, no signs of distress at this time, patient states I'm ready for discharge.

## 2023-09-14 NOTE — H&P
[facility] Urology New Patient/H&P:      Aaron Ross is a 73 y.o. male who presents for BPH evaluation at referral of Dr Lemon     Last seen by Dr Lemon on 6/6/23 noting  Obstructive sleep apnea using his BiPAP regularly.  BPH with nocturia.  PSA 1.1.  Check is due again.  Nocturia 2 times a night.  Status post right knee replacement. On aricept, refilled. As well had been on flomax 0.4 and was advised to increase to 0.8mg  Recent ER visit 5/23/23 for contusion of foot.  Notes he has a hairline fracture.     He presents today noting  Has not increased dose of flomax yet  Has been on it about 3 years, and initially worked well.  Now does not.  Also been on aricept about 4 years.   Father had prostate cancer diagnosed late in his 70s or 80s and lived into his 90s. He did have radiation though and complications from it.  He did have bladder ultrasound in 2/2020 noting: The prevoid bladder volume is 330 cubic cm.  The postvoid bladder volume is 60 cubic cm.  The prostate gland measures 3.8 x 4.0 x 3.6 cm.  - Pt reports that he did void 3 different times before getting to that PVR noting to empty about a 3rd each time.  Before the final was documented.  AUA SS:  23/4 (5: Emptying, intermittency; 4: Weak stream; 3: Frequency, straining; 2: Sleeping; 1: Urgency).  Medication helps 5/10.  Again noting that it helps a lot at 1st but the effects have diminished.  Feels the incomplete emptying b  He did have updated labs this AM from primary care:  Today: PSA 1.2, remainder as below.   0-2 soft drinks per day. When increased to 2, urgency worse, so replaced with tea which is better from urgency standpoint. But went to water.  Would like to be off bp meds, notes has had issues at times with it being low.             Past Medical History:   Diagnosis Date    Arthritis      BMI 36.0-36.9,adult 6/25/2018    Sleep apnea       USES CPAP    Thyroid disease      Varicose veins of both lower extremities      Wears glasses                      Past Surgical History:   Procedure Laterality Date    BACK SURGERY         PAPAYA JUICE INJECTION    FINGER MASS EXCISION Right 01/19/2023     Procedure: Right small finger mucoid cyst excision;  Surgeon: Anthony Edwards MD;  Location: [facility] OR;  Service: Orthopedics;  Laterality: Right;    JOINT REPLACEMENT Right 10/01/2012    KNEE SURGERY   2009    THYROID SURGERY   2013     PARTIAL    UVULA   2000     REMOVED FOR SLEEP APNEA         History reviewed. No pertinent family history.     Social History                   Socioeconomic History    Marital status:    Occupational History    Occupation: RETIRED   Tobacco Use    Smoking status: Never    Smokeless tobacco: Never   Substance and Sexual Activity    Alcohol use: Yes       Comment: RARELY    Drug use: No    Sexual activity: Yes       Partners: Female                      Review of patient's allergies indicates:   Allergen Reactions    Adhesive tape-silicones Rash       Allergy to adhesive on gauze - paper & regular tape ok         Medications Reviewed: see MAR     Focused Physical Exam           Vitals:     06/12/23 0904   BP: 126/66   Pulse: 62      Body mass index is 36.62 kg/m². Weight: 122.5 kg (270 lb) Height: 6' (182.9 cm)         Abdomen: Soft, non-tender, nondistended, no CVA tenderness  : deferred to time of prostate biopsy  RRR  CTAB     LABS:     Recent Results             Recent Results (from the past 336 hour(s))   CBC Auto Differential     Collection Time: 06/12/23  8:08 AM   Result Value Ref Range     WBC 6.86 3.90 - 12.70 K/uL     RBC 4.38 (L) 4.60 - 6.20 M/uL     Hemoglobin 13.9 (L) 14.0 - 18.0 g/dL     Hematocrit 41.3 40.0 - 54.0 %     MCV 94 82 - 98 fL     MCH 31.7 (H) 27.0 - 31.0 pg     MCHC 33.7 32.0 - 36.0 g/dL     RDW 13.2 11.5 - 14.5 %     Platelets 172 150 - 450 K/uL     MPV 9.8 9.2 - 12.9 fL     Immature Granulocytes 0.4 0.0 - 0.5 %     Gran # (ANC) 3.3 1.8 - 7.7 K/uL     Immature Grans (Abs) 0.03 0.00 - 0.04  K/uL     Lymph # 2.4 1.0 - 4.8 K/uL     Mono # 0.9 0.3 - 1.0 K/uL     Eos # 0.2 0.0 - 0.5 K/uL     Baso # 0.04 0.00 - 0.20 K/uL     nRBC 0 0 /100 WBC     Gran % 48.4 38.0 - 73.0 %     Lymph % 34.7 18.0 - 48.0 %     Mono % 13.3 4.0 - 15.0 %     Eosinophil % 2.6 0.0 - 8.0 %     Basophil % 0.6 0.0 - 1.9 %     Differential Method Automated     Comprehensive Metabolic Panel     Collection Time: 06/12/23  8:08 AM   Result Value Ref Range     Sodium 138 136 - 145 mmol/L     Potassium 3.9 3.5 - 5.1 mmol/L     Chloride 106 95 - 110 mmol/L     CO2 27 23 - 29 mmol/L     Glucose 96 70 - 110 mg/dL     BUN 17 8 - 23 mg/dL     Creatinine 1.1 0.5 - 1.4 mg/dL     Calcium 8.9 8.7 - 10.5 mg/dL     Total Protein 6.7 6.0 - 8.4 g/dL     Albumin 4.1 3.5 - 5.2 g/dL     Total Bilirubin 1.0 0.1 - 1.0 mg/dL     Alkaline Phosphatase 86 55 - 135 U/L     AST 22 10 - 40 U/L     ALT 19 10 - 44 U/L     Anion Gap 5 (L) 8 - 16 mmol/L     eGFR >60.0 >60 mL/min/1.73 m^2   Lipid Panel     Collection Time: 06/12/23  8:08 AM   Result Value Ref Range     Cholesterol 105 (L) 120 - 199 mg/dL     Triglycerides 81 30 - 150 mg/dL     HDL 34 (L) 40 - 75 mg/dL     LDL Cholesterol 54.8 (L) 63.0 - 159.0 mg/dL     HDL/Cholesterol Ratio 32.4 20.0 - 50.0 %     Total Cholesterol/HDL Ratio 3.1 2.0 - 5.0     Non-HDL Cholesterol 71 mg/dL   PSA, Screening     Collection Time: 06/12/23  8:08 AM   Result Value Ref Range     PSA, Screen 1.2 0.00 - 4.00 ng/mL   TSH     Collection Time: 06/12/23  8:08 AM   Result Value Ref Range     TSH 4.080 0.340 - 5.600 uIU/mL   POCT Bladder Scan     Collection Time: 06/12/23  9:17 AM   Result Value Ref Range     POC Residual Urine Volume 28 0 - 100 mL   POCT Urinalysis(Instrument)     Collection Time: 06/12/23  9:41 AM   Result Value Ref Range     Color, POC UA Yellow Yellow, Straw, Colorless     Clarity, POC UA Clear Clear     Glucose, POC UA Negative Negative     Bilirubin, POC UA Negative Negative     Ketones, POC UA Negative Negative      Spec Grav POC UA >=1.030 1.005 - 1.030     Blood, POC UA Negative Negative     pH, POC UA 5.5 5.0 - 8.0     Protein, POC UA Negative Negative     Urobilinogen, POC UA 1.0 <=1.0     Nitrite, POC UA Negative Negative     WBC, POC UA Negative Negative               Assessment/Diagnosis:     1. BPH with obstruction/lower urinary tract symptoms  Ambulatory referral/consult to Urology     POCT Urinalysis(Instrument)     POCT Bladder Scan             Plans:  Extensively reviewed medical history including workup and management by primary care as well as his lab work and normal PSA history and medical management of his obstructive lower urinary tract symptoms.  We did have a discussion about the natural history of BPH with obstructive symptoms, progressing even despite medication secondary to anatomic considerations, as well as the changes at the bladder level of chronic obstruction which can contribute to his urgency and frequency symptoms as well as trouble emptying.  He certainly does have all of these and severe symptoms despite Flomax 0.4 mg nightly, and we did discuss the indication to double the dose, however he is not started this yet and on review of his history I recommended that he do not double the dose.  We did review the new literature about the contributions of alpha-blocker such as Flomax to dementia and memory changes and he is already on Aricept and has been on it for a few years and therefore the risks outweigh the benefits in his case, especially with long-term alpha blocker with initial response, and now severe symptoms, would likely benefit from some form of BPH intervention to relieve prostate obstruction and allow him to discontinue medical therapy, especially with the likely changes of chronic obstruction contributing to his irritative symptoms.     We reviewed lower tract evaluation with cystoscopy and prostate ultrasound to help guide further recommendations.  Noted volumetric measurement of his  prostate transabdominally was done in 2020 with a bladder scan PVR at that time.  He is emptying well today, but does still have sensation of incomplete emptying and does void multiple times to empty, so should continue his alpha blocker for now pending workup, with goal of discontinuing medical therapy in the future.  Procedures in detail of diagnostic flexible cystourethroscopy including the use of local anesthesia with xylocaine jelly as well as transrectal ultrasound-guided volumetric measurement of the prostate were described to the patient in detail and he is agreeable to proceed after all questions were answered.  This was scheduled at the [facility] on 7/25/23.  His PSA is up-to-date and normal and stable as of today with primary care and we did discuss routine prostate cancer screening recommendations from 50-70, noting that a normal PSA in the 1 range at age 73 carries with it a less than 2% risk of clinically significant prostate cancer in his lifetime.  We did review that family history in a first-degree relative does technically increase risk, however his father was diagnosed late in his 80s and discussed a bit about the natural history of prostate cancer with aging and when routine screening is recommended and can likely discontinue routine prostate cancer screening and focus on the workup and management of his BPH and obstructive lower urinary tract symptoms.  He was agreeable.    All conservative recommendations for urgency and frequency were provided in writing and reviewed with the patient as well.  He can focus at this time on continuing to eliminate bladder irritants which he already has, as well as timed voiding to facilitate better bladder emptying, pending further evaluation.     Cysto/trus 7/25  Manish at time of workup, with normal PSA history     10 pt ros neg    Findings:   Volume 50.02 cm3 (W 44.87mm, H 39.43 mm, L 54.06mm), no med lobe, mild  PVR  Cysto:  significant lateral lobe obstruction, more  anterolateral proximally, kissing lobes distally, Mild impression at the base of the bladder without any intravesical extension and with moderate diffuse trabeculations and sparse scattered early cellules    Disposition:  He does have moderate obstructive lower urinary tract symptoms despite alpha-blocker, Uroxatral, which he is able to tolerate but was unable to tolerate alternative Flomax due to dizziness. Reviewed adding finasteride given gland size >40g but unlikely to benefit given significant obstruction, and goal of discontinuing Flomax given dementia risk and therefore would not be utilized as monotherapy, and therefore discussed options for intervention for his medication refractory LUTS. Discussed options for intervention such as urolift, rezum, tuip, turp. He is interested in proceeding with Urolift after discussion of all risks, benefits, and alternatives. Especially given gland size and anatomic proportions and minimal symptom profile with quick return to activity.     Procedure in detail discussed. Discussed risks including but not limited to hematuria, postop retention, pain, infection, injury to bladder or urethra, and worsening urgency, latent pelvic pain, no guarantee of symptomatic relief, prostate regrowth, need for future procedures. We did discuss the non-incidence of ejaculatory dysfunction, as well as the 1-2% retreatment rate in 5 year studies. Also discussed about 20% of people may need a catheter after (due to retention or hematuria) but not required if voiding postop, though tend to leave ferreira prophylactically overnight, which he is agreeable to.     Discussed that symptomatic relief may take longer to be fully appreciated, in the setting of longer standing obstruction, and period of symptomatic adjustment postop. Also discussed transient increases in urgency frequency which may yield transient UUI in postop period.     All questions patient had answered and appropriate informed consent  obtained.  Urolift in OR 9/14/23 per pt preference with summer travel plans

## 2023-09-14 NOTE — ANESTHESIA POSTPROCEDURE EVALUATION
Anesthesia Post Evaluation    Patient: Aaron Ross    Procedure(s) Performed: Procedure(s) (LRB):  CYSTOSCOPY, WITH INSERTION OF UROLIFT IMPLANT (N/A)    Final Anesthesia Type: general      Patient location during evaluation: PACU  Patient participation: Yes- Able to Participate  Level of consciousness: awake and alert and oriented  Post-procedure vital signs: reviewed and stable  Pain management: adequate  Airway patency: patent    PONV status at discharge: No PONV  Anesthetic complications: no      Cardiovascular status: blood pressure returned to baseline and stable  Respiratory status: unassisted and spontaneous ventilation  Hydration status: euvolemic  Follow-up not needed.          Vitals Value Taken Time   /72 09/14/23 1320   Temp   09/14/23 1324   Pulse 66 09/14/23 1323   Resp 17 09/14/23 1323   SpO2 97 % 09/14/23 1323   Vitals shown include unvalidated device data.      No case tracking events are documented in the log.      Pain/Alexander Score: Alexander Score: 10 (9/14/2023 12:47 PM)

## 2023-09-14 NOTE — ANESTHESIA PREPROCEDURE EVALUATION
09/14/2023  Aaron Ross is a 73 y.o., male.      Pre-op Assessment    I have reviewed the Patient Summary Reports.     I have reviewed the Nursing Notes. I have reviewed the NPO Status.   I have reviewed the Medications.     Review of Systems  Anesthesia Hx:  No problems with previous Anesthesia    Social:  Non-Smoker    Hematology/Oncology:         -- Anemia:   Cardiovascular:   Hypertension, well controlled hyperlipidemia    Pulmonary:   Sleep Apnea    Renal/:   BPH    Musculoskeletal:   Arthritis     Neurological:  Neurology Normal    Endocrine:  Endocrine Normal  Obesity / BMI > 30, Morbid Obesity / BMI > 40      Physical Exam  General: Well nourished, Cooperative, Alert and Oriented    Airway:  Mallampati: II   Mouth Opening: Normal  TM Distance: Normal  Neck ROM: Normal ROM        Anesthesia Plan  Type of Anesthesia, risks & benefits discussed:    Anesthesia Type: Gen ETT, Gen Supraglottic Airway, Gen Natural Airway, MAC  Intra-op Monitoring Plan: Standard ASA Monitors  Post Op Pain Control Plan: multimodal analgesia  Induction:  IV  Airway Plan: Direct, Video and Fiberoptic, Post-Induction  Informed Consent: Informed consent signed with the Patient and all parties understand the risks and agree with anesthesia plan.  All questions answered.   ASA Score: 2    Ready For Surgery From Anesthesia Perspective.     .

## 2023-09-14 NOTE — PLAN OF CARE
20 cc out of balloon and off traction was oozing blood from penis made dr tracey aware and  he was at bedside reassured   pt and proceeded to  irrigate cath of mod amt of small blood clots off traction and now since irrigated clr in tube now draining red to pink clr cont to assess

## 2023-09-14 NOTE — PLAN OF CARE
Pt prepped for surgery, ABX at BS. Pt belongings, including clothes are in personal belongings bag at post-op locker. Wife, Nelda, signed up for text message alerts. Breathing exercises completed by Respiratory. All questions answered, POC explained, v/u, call bell in reach.

## 2023-09-14 NOTE — PLAN OF CARE
Released from Pacu per Anesthesia when criteria met pain controlled skin w+d No nausea No emesis dsg dry intact aaox4 encouraged deep breaths encouraged Is use at home Pt has all belongings in post op

## 2023-09-14 NOTE — OP NOTE
[facility] Urology Operative/Brief Discharge Note     Date: 9/14/2023     Staff Surgeon: Robe Reyes MD     Pre-Op Diagnosis:   BPH/LUTS                     Post-Op Diagnosis: same     Procedure(s) Performed:   Urolift: cystoscopy with prostatic urethral lift/transprostatic tissue retraction (64215) with delivery of 6 total implants (52442 x5)     Specimen(s):  none     Anesthesia: General LMA anesthesia     Findings:  Significant lateral lobe obstruction, of which after standard 4 implants, still had assymetric mid/distal left lateral lobe obstruction, relieved with 5th implant. Of note right proximal implant initial delivery had bone strike requiring 2 implants delivered to place 1 here thus 6 delivered and 5 implanted. Moderate anterior commissure and bladder neck bleeding fulgurated with Bugbee monopolar electrocautery at the end for hemostasis.     Estimated Blood Loss: minimal     Drains: 22 fr ferreira       Complications: none     Indications for procedure:   72 yo M with long history of BPH/LUTS, severe on flomax, with significant lateral lobe obstruction both proximally and distally on lower tract workup seen. After extensive risk benefit discussion of all treatment options, he elected to proceed with UroLift, especially given minimally invasive nature to minimize treatment side effects. All risks and benefits discussed and appropriate informed consent obtained.     Procedure in detail:  After appropriate informed consent was obtained, the patient was taken to the cystoscopy suite in the operating room.  Preoperative antibiotics were given and a WHO proved timeout was performed.      20fr UroLift scope was passed per urethra and confirmed previous cystoscopic findings noted above.  Bilateral ureteral orifices were in their orthotopic position on the trigone bilaterally and the bladder otherwise appeared normal with moderate trabeculations.       Cystoscopy bridge was then replaced with a urolift delivery  device.  The first treatment site was 2cm distal to bladder neck angled anteriorly. The distal tip of the delivery device was then angled laterally, approximately 10° at this position, to compress the lateral lobe. The trigger was pulled to deploy a needle containing the implant through the capsule of the prostate, and additional 10°. The needle was then retracted allowing the implants to be delivered to the capsular surface of the prostate which was then tensioned to a short capsular tensioning and removal of the slack monofilament.  The device was then angled back towards midline and slowly advanced proximally about 3-4 mm until cystoscopic verification that the monofilament was centered in the delivery bay of the device.  Excess filament was then severed with suture cut maneuver of device. The delivery device was then readvanced into the bladder, and leaving the cystoscopic sheath in place, and implant cartridge removed and replaced with a second Urolift implant cartrdige, for which contralateral implant placed in similar fashion. Placed a bit anterior which did facilitate open bladder neck at rest.   - of note bone strike right anterior proximal so needed delivery of 2nd implant to place 1 here     After the initial 2 implants were placed, 2 additional implants, were placed distal at level of verumontanum  View with visual obturator noted continued obstruction as noted above with residual tissue treated left mid/distal as noted in findings to total 5 implants placed (with 6 delivered) after which was, open bladder neck and continuous anterior channel from verumontanum to bladder neck. Scope confirmed proximal implants within prostatic urethra and distal to bladder neck.       The Urolift scope was then exchanged for a rigid cystoscope for which Bugbee monopolar cautery was also used then spot prostatic urethral fulguration of the anterior prostatic urethra bleeding.  A 22 Telugu Addison with 30 cc in a 10 cc  balloon was placed manually irrigated with 60 cc catheter tip syringe noting urine to be clear, and then placed to traction on patient's thigh with Mastisol and silk tape.  Catheter tip syringe again used to irrigate to clear.  He tolerated procedure well without complication and was awakened and taken to PACU without incident.       Disposition:   He will be observed in PACU to ensure urine remains reasonably clear and does not require irrigation as he hydrates and meets pacu criteria after traction is removed for 1 hour, then will be discharged home. Prophylactic postoperative antibiotics, and urinary analgesic/anti-spasmodic to help control any postoperative irritative or inflammatory symptoms. He will remove his ferreira at home on POD 2. He will continue his flomax 1 week after ferreira removal, after which time he may discontinue its use. He will return in 1 month for reeval with NP with repeat symptom assessment and measurement of postvoid residual.     Discharge home today status post uncomplicated procedure as above  Diet - resume home diet  Follow up: 1 month np urolift fu  Instructions:  Avoid strenuous activity until x3-5 days after ferreira removed  No riding mowers, bicycles, motorcycles, tractors for 2-3 weeks  No fish oil/aspirin/Jun x3-5 days.   Continue flomax nightly until 1 week after ferreira removed then STOP it  Pink/clear red (koolaid) urine ok as long as clear/translucent without dark blood or clots, and urinating well/draining well without difficulty - drink lots of water.  May see blood drip around ferreira, is ok  Avoid constipation, pushing/straining with BM. Use stool softener if needed  Will have increased urgency and frequency after removing catheter so avoid/minimize bladder irritants (coffee, soda, tea, alcohol)  Use pyridium once ferreira removed if burning persists  Complete antibiotics - 5 total days postop  Remove ferreira catheter as instructed on Saturday AM between 8827-9628 with syringe  provided.   If unable to void within 4-6 hours of Ferreira catheter removal present to ER at hospital surgery was perfomed for evaluation and possible replacement  Meds:     Medication List        START taking these medications      phenazopyridine 200 MG tablet  Commonly known as: PYRIDIUM  Take 1 tablet (200 mg total) by mouth 3 (three) times daily as needed (dysuria after ferreira removal).     sulfamethoxazole-trimethoprim 800-160mg 800-160 mg Tab  Commonly known as: BACTRIM DS  Take 1 tablet by mouth 2 (two) times daily. for 5 days            CHANGE how you take these medications      donepeziL 10 MG tablet  Commonly known as: ARICEPT  Take 1 tablet (10 mg total) by mouth once daily.  What changed: when to take this            CONTINUE taking these medications      aspirin 81 MG EC tablet  Commonly known as: ECOTRIN     irbesartan 75 MG tablet  Commonly known as: AVAPRO  Take 1 tablet (75 mg total) by mouth once daily.     ketoconazole 2 % shampoo  Commonly known as: NIZORAL  Apply 1 Applicatorful topically every other day.     tamsulosin 0.4 mg Cap  Commonly known as: FLOMAX  nightly               Where to Get Your Medications        These medications were sent to [id number] - [place], [place] - [address] [place] [facility] [place] [facility] [place] [address]  [address], [place] [address]      Phone: [phone]   phenazopyridine 200 MG tablet  sulfamethoxazole-trimethoprim 800-160mg 800-160 mg Tab

## 2023-09-14 NOTE — TRANSFER OF CARE
Anesthesia Transfer of Care Note    Patient: Aaron Ross    Procedure(s) Performed: Procedure(s) (LRB):  CYSTOSCOPY, WITH INSERTION OF UROLIFT IMPLANT (N/A)    Patient location: PACU    Anesthesia Type: general    Transport from OR: Transported from OR on 2-3 L/min O2 by NC with adequate spontaneous ventilation    Post pain: adequate analgesia    Post assessment: no apparent anesthetic complications and tolerated procedure well    Post vital signs: stable    Level of consciousness: awake, alert and oriented    Nausea/Vomiting: no nausea/vomiting    Complications: none    Transfer of care protocol was followed      Last vitals:   Visit Vitals  /70 (BP Location: Right arm, Patient Position: Lying)   Pulse (!) 56   Temp 36.8 °C (98.2 °F) (Oral)   Resp 16   Wt 122.5 kg (270 lb)   SpO2 (!) 94%   BMI 36.62 kg/m²

## 2023-09-14 NOTE — PLAN OF CARE
Released from Pacu per Anesthesia aware of all b/p AND NO NEW ORDERS CLEARED PER DR ROBERTSON TO COME OVER TO POST OP

## 2023-09-15 VITALS
TEMPERATURE: 98 F | SYSTOLIC BLOOD PRESSURE: 148 MMHG | DIASTOLIC BLOOD PRESSURE: 67 MMHG | BODY MASS INDEX: 36.62 KG/M2 | HEART RATE: 66 BPM | OXYGEN SATURATION: 98 % | WEIGHT: 270 LBS | RESPIRATION RATE: 16 BRPM

## 2023-09-15 NOTE — PROGRESS NOTES
Very pleased with care given.  States all staff were kind and supportive.  States he has read and understands all discharge instructions.

## 2023-10-03 ENCOUNTER — OFFICE VISIT (OUTPATIENT)
Dept: FAMILY MEDICINE | Facility: CLINIC | Age: 73
End: 2023-10-03
Payer: MEDICARE

## 2023-10-03 VITALS
HEART RATE: 63 BPM | OXYGEN SATURATION: 95 % | HEIGHT: 72 IN | TEMPERATURE: 97 F | DIASTOLIC BLOOD PRESSURE: 78 MMHG | BODY MASS INDEX: 35.35 KG/M2 | SYSTOLIC BLOOD PRESSURE: 134 MMHG | WEIGHT: 261 LBS

## 2023-10-03 DIAGNOSIS — Z79.82 ASPIRIN LONG-TERM USE: Primary | ICD-10-CM

## 2023-10-03 DIAGNOSIS — M17.9 OSTEOARTHRITIS OF KNEE, UNSPECIFIED LATERALITY, UNSPECIFIED OSTEOARTHRITIS TYPE: ICD-10-CM

## 2023-10-03 DIAGNOSIS — H60.90 OTITIS EXTERNA, UNSPECIFIED CHRONICITY, UNSPECIFIED LATERALITY, UNSPECIFIED TYPE: ICD-10-CM

## 2023-10-03 DIAGNOSIS — E78.5 HYPERLIPIDEMIA, UNSPECIFIED HYPERLIPIDEMIA TYPE: ICD-10-CM

## 2023-10-03 DIAGNOSIS — Z13.1 SCREENING FOR DIABETES MELLITUS: ICD-10-CM

## 2023-10-03 DIAGNOSIS — I10 HYPERTENSION, ESSENTIAL: ICD-10-CM

## 2023-10-03 DIAGNOSIS — Z96.652 STATUS POST TOTAL LEFT KNEE REPLACEMENT: ICD-10-CM

## 2023-10-03 DIAGNOSIS — B35.0 FUNGAL INFECTION OF THE SCALP: ICD-10-CM

## 2023-10-03 DIAGNOSIS — D51.8 OTHER VITAMIN B12 DEFICIENCY ANEMIA: ICD-10-CM

## 2023-10-03 DIAGNOSIS — R35.1 BPH ASSOCIATED WITH NOCTURIA: ICD-10-CM

## 2023-10-03 DIAGNOSIS — K64.4 BLEEDING EXTERNAL HEMORRHOIDS: ICD-10-CM

## 2023-10-03 DIAGNOSIS — N40.1 BPH ASSOCIATED WITH NOCTURIA: ICD-10-CM

## 2023-10-03 DIAGNOSIS — I25.10 ARTERIOSCLEROTIC CARDIOVASCULAR DISEASE: ICD-10-CM

## 2023-10-03 DIAGNOSIS — R73.03 PREDIABETES: ICD-10-CM

## 2023-10-03 DIAGNOSIS — R79.9 ABNORMAL FINDING OF BLOOD CHEMISTRY, UNSPECIFIED: ICD-10-CM

## 2023-10-03 DIAGNOSIS — G47.33 OSA TREATED WITH BIPAP: ICD-10-CM

## 2023-10-03 PROCEDURE — 99214 PR OFFICE/OUTPT VISIT, EST, LEVL IV, 30-39 MIN: ICD-10-PCS | Mod: S$GLB,,, | Performed by: FAMILY MEDICINE

## 2023-10-03 PROCEDURE — G0008 FLU VACCINE - QUADRIVALENT - ADJUVANTED: ICD-10-PCS | Mod: S$GLB,,, | Performed by: FAMILY MEDICINE

## 2023-10-03 PROCEDURE — G0008 ADMIN INFLUENZA VIRUS VAC: HCPCS | Mod: S$GLB,,, | Performed by: FAMILY MEDICINE

## 2023-10-03 PROCEDURE — 90694 FLU VACCINE - QUADRIVALENT - ADJUVANTED: ICD-10-PCS | Mod: S$GLB,,, | Performed by: FAMILY MEDICINE

## 2023-10-03 PROCEDURE — 99214 OFFICE O/P EST MOD 30 MIN: CPT | Mod: S$GLB,,, | Performed by: FAMILY MEDICINE

## 2023-10-03 PROCEDURE — 90694 VACC AIIV4 NO PRSRV 0.5ML IM: CPT | Mod: S$GLB,,, | Performed by: FAMILY MEDICINE

## 2023-10-03 RX ORDER — TAMSULOSIN HYDROCHLORIDE 0.4 MG/1
CAPSULE ORAL
Qty: 180 CAPSULE | Refills: 0
Start: 2023-10-03 | End: 2023-10-06 | Stop reason: SDUPTHER

## 2023-10-03 RX ORDER — DONEPEZIL HYDROCHLORIDE 10 MG/1
10 TABLET, FILM COATED ORAL DAILY
Qty: 90 TABLET | Refills: 3 | Status: SHIPPED | OUTPATIENT
Start: 2023-10-03 | End: 2023-10-06 | Stop reason: SDUPTHER

## 2023-10-03 RX ORDER — KETOCONAZOLE 20 MG/ML
1 SHAMPOO, SUSPENSION TOPICAL EVERY OTHER DAY
Qty: 480 ML | Refills: 3 | Status: SHIPPED | OUTPATIENT
Start: 2023-10-03 | End: 2023-10-06 | Stop reason: SDUPTHER

## 2023-10-03 RX ORDER — IRBESARTAN 75 MG/1
75 TABLET ORAL DAILY
Qty: 90 TABLET | Refills: 3 | Status: SHIPPED | OUTPATIENT
Start: 2023-10-03 | End: 2023-10-06 | Stop reason: SDUPTHER

## 2023-10-03 RX ORDER — POTASSIUM CHLORIDE 750 MG/1
10 TABLET, EXTENDED RELEASE ORAL DAILY
Qty: 90 TABLET | Refills: 1 | Status: SHIPPED | OUTPATIENT
Start: 2023-10-03 | End: 2023-10-06 | Stop reason: SDUPTHER

## 2023-10-04 PROBLEM — R73.03 PREDIABETES: Status: ACTIVE | Noted: 2023-10-04

## 2023-10-05 ENCOUNTER — PATIENT MESSAGE (OUTPATIENT)
Dept: FAMILY MEDICINE | Facility: CLINIC | Age: 73
End: 2023-10-05
Payer: MEDICARE

## 2023-10-05 NOTE — PROGRESS NOTES
Subjective:       Patient ID: Aaron Ross is a 73 y.o. male.    Chief Complaint: Follow-up    BMI is 35.4.  Degenerative joint disease of the knees.  Status post knee replacements.  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  Both father him particularly at night.  Hypertension is controlled.  No chest pain no palpitations.  Slight anemia hemoglobin 13.7.  Also has hyperlipidemia.  Has ASCVD.  No claudication.  BPH status post UroLift doing better.  Using his aspirin prior to the procedure he will be resuming it today.  Obstructive sleep apnea using his BiPAP regularly.  Hyperlipidemia off his pravastatin for 3 weeks no change in his joints.  Otitis externa.  Using some peroxide.  Dried out the skin too much.  Also has some hemorrhoid issues.  Some bleeding.  Colonoscopy December 29, 2022 repeat recommended in 3 years.  Potassium check 3.6.    Physical examination.  Vital signs noted.  No acute distress.  Neck without bruit no adenopathy.  Tympanic membranes are normal.  Canals somewhat swollen.  Neck without bruit.  Chest clear.  Heart regular rate and rhythm.  Abdomen bowel sounds are positive soft nontender.  Extremities without edema positive pulses.        Objective:        Assessment:       1. Aspirin long-term use    2. Hypertension, essential    3. Fungal infection of the scalp    4. BMI 35.0-35.9,adult    5. Osteoarthritis of knee, unspecified laterality, unspecified osteoarthritis type    6. Status post total left knee replacement    7. Other vitamin B12 deficiency anemia    8. Hyperlipidemia, unspecified hyperlipidemia type    9. Arteriosclerotic cardiovascular disease    10. BPH associated with nocturia    11. SIMON treated with BiPAP    12. Otitis externa, unspecified chronicity, unspecified laterality, unspecified type    13. Bleeding external hemorrhoids    14. Screening for diabetes mellitus    15. Prediabetes    16. Abnormal finding of blood chemistry, unspecified        Plan:       Aspirin long-term  use    Hypertension, essential  -     irbesartan (AVAPRO) 75 MG tablet; Take 1 tablet (75 mg total) by mouth once daily.  Dispense: 90 tablet; Refill: 3  -     CBC Auto Differential; Future; Expected date: 10/17/2023    Fungal infection of the scalp  -     ketoconazole (NIZORAL) 2 % shampoo; Apply 1 Applicatorful topically every other day.  Dispense: 480 mL; Refill: 3    BMI 35.0-35.9,adult  -     TSH; Future; Expected date: 10/17/2023    Osteoarthritis of knee, unspecified laterality, unspecified osteoarthritis type    Status post total left knee replacement    Other vitamin B12 deficiency anemia  -     Ferritin; Future; Expected date: 10/17/2023  -     Iron and TIBC; Future; Expected date: 10/03/2023  -     Vitamin B12; Future; Expected date: 10/03/2023  -     Folate; Future; Expected date: 10/17/2023    Hyperlipidemia, unspecified hyperlipidemia type  -     Lipid Panel; Future; Expected date: 10/17/2023    Arteriosclerotic cardiovascular disease    BPH associated with nocturia    SIMON treated with BiPAP    Otitis externa, unspecified chronicity, unspecified laterality, unspecified type    Bleeding external hemorrhoids    Screening for diabetes mellitus  -     Hemoglobin A1C; Future; Expected date: 10/17/2023  -     Comprehensive Metabolic Panel; Future; Expected date: 10/17/2023    Prediabetes  -     TSH; Future; Expected date: 10/17/2023    Abnormal finding of blood chemistry, unspecified  -     Hemoglobin A1C; Future; Expected date: 10/17/2023    Other orders  -     donepeziL (ARICEPT) 10 MG tablet; Take 1 tablet (10 mg total) by mouth once daily.  Dispense: 90 tablet; Refill: 3  -     tamsulosin (FLOMAX) 0.4 mg Cap; nightly  Dispense: 180 capsule; Refill: 0  -     Influenza (FLUAD) - Quadrivalent (Adjuvanted) *Preferred* (65+) (PF)  -     potassium chloride SA (K-DUR,KLOR-CON M) 10 MEQ tablet; Take 1 tablet (10 mEq total) by mouth once daily.  Dispense: 90 tablet; Refill: 1    Refilled all of his medications.   Check lipids.  Ferritin iron TIBC B12 folate TSH A1c CMP CBC.  Shingles vaccine recommended.  Flu shot high-dose today.  Handicap tag due to his joints.  Vitamin with iron daily.  Potassium 10 mEq dated do the low potassium.  Years due to the dryness.

## 2023-10-06 DIAGNOSIS — I10 HYPERTENSION, ESSENTIAL: ICD-10-CM

## 2023-10-06 DIAGNOSIS — B35.0 FUNGAL INFECTION OF THE SCALP: ICD-10-CM

## 2023-10-06 RX ORDER — IRBESARTAN 75 MG/1
75 TABLET ORAL DAILY
Qty: 90 TABLET | Refills: 3 | Status: SHIPPED | OUTPATIENT
Start: 2023-10-06 | End: 2024-04-01

## 2023-10-06 RX ORDER — POTASSIUM CHLORIDE 750 MG/1
10 TABLET, EXTENDED RELEASE ORAL DAILY
Qty: 90 TABLET | Refills: 1 | Status: SHIPPED | OUTPATIENT
Start: 2023-10-06

## 2023-10-06 RX ORDER — TAMSULOSIN HYDROCHLORIDE 0.4 MG/1
CAPSULE ORAL
Qty: 180 CAPSULE | Refills: 1 | Status: SHIPPED | OUTPATIENT
Start: 2023-10-06 | End: 2023-12-08

## 2023-10-06 RX ORDER — KETOCONAZOLE 20 MG/ML
1 SHAMPOO, SUSPENSION TOPICAL EVERY OTHER DAY
Qty: 480 ML | Refills: 3 | Status: SHIPPED | OUTPATIENT
Start: 2023-10-06

## 2023-10-06 RX ORDER — DONEPEZIL HYDROCHLORIDE 10 MG/1
10 TABLET, FILM COATED ORAL DAILY
Qty: 90 TABLET | Refills: 3 | Status: SHIPPED | OUTPATIENT
Start: 2023-10-06 | End: 2024-03-07

## 2023-10-11 ENCOUNTER — LAB VISIT (OUTPATIENT)
Dept: LAB | Facility: HOSPITAL | Age: 73
End: 2023-10-11
Attending: FAMILY MEDICINE
Payer: MEDICARE

## 2023-10-11 DIAGNOSIS — E78.5 HYPERLIPIDEMIA, UNSPECIFIED HYPERLIPIDEMIA TYPE: ICD-10-CM

## 2023-10-11 DIAGNOSIS — R79.9 ABNORMAL FINDING OF BLOOD CHEMISTRY, UNSPECIFIED: ICD-10-CM

## 2023-10-11 DIAGNOSIS — R73.03 PREDIABETES: ICD-10-CM

## 2023-10-11 DIAGNOSIS — D51.8 OTHER VITAMIN B12 DEFICIENCY ANEMIA: ICD-10-CM

## 2023-10-11 DIAGNOSIS — Z13.1 SCREENING FOR DIABETES MELLITUS: ICD-10-CM

## 2023-10-11 DIAGNOSIS — I10 HYPERTENSION, ESSENTIAL: ICD-10-CM

## 2023-10-11 LAB
ALBUMIN SERPL BCP-MCNC: 4.2 G/DL (ref 3.5–5.2)
ALP SERPL-CCNC: 93 U/L (ref 55–135)
ALT SERPL W/O P-5'-P-CCNC: 16 U/L (ref 10–44)
ANION GAP SERPL CALC-SCNC: 5 MMOL/L (ref 8–16)
AST SERPL-CCNC: 18 U/L (ref 10–40)
BASOPHILS # BLD AUTO: 0.04 K/UL (ref 0–0.2)
BASOPHILS NFR BLD: 0.6 % (ref 0–1.9)
BILIRUB SERPL-MCNC: 0.7 MG/DL (ref 0.1–1)
BUN SERPL-MCNC: 12 MG/DL (ref 8–23)
CALCIUM SERPL-MCNC: 9.5 MG/DL (ref 8.7–10.5)
CHLORIDE SERPL-SCNC: 106 MMOL/L (ref 95–110)
CHOLEST SERPL-MCNC: 147 MG/DL (ref 120–199)
CHOLEST/HDLC SERPL: 4 {RATIO} (ref 2–5)
CO2 SERPL-SCNC: 28 MMOL/L (ref 23–29)
CREAT SERPL-MCNC: 1.1 MG/DL (ref 0.5–1.4)
DIFFERENTIAL METHOD: ABNORMAL
EOSINOPHIL # BLD AUTO: 0.2 K/UL (ref 0–0.5)
EOSINOPHIL NFR BLD: 3.1 % (ref 0–8)
ERYTHROCYTE [DISTWIDTH] IN BLOOD BY AUTOMATED COUNT: 13.3 % (ref 11.5–14.5)
EST. GFR  (NO RACE VARIABLE): >60 ML/MIN/1.73 M^2
ESTIMATED AVG GLUCOSE: 114 MG/DL (ref 68–131)
FERRITIN SERPL-MCNC: 98.6 NG/ML (ref 20–300)
FOLATE SERPL-MCNC: 11.9 NG/ML (ref 4–24)
GLUCOSE SERPL-MCNC: 101 MG/DL (ref 70–110)
HBA1C MFR BLD: 5.6 % (ref 4.5–6.2)
HCT VFR BLD AUTO: 41.5 % (ref 40–54)
HDLC SERPL-MCNC: 37 MG/DL (ref 40–75)
HDLC SERPL: 25.2 % (ref 20–50)
HGB BLD-MCNC: 14.1 G/DL (ref 14–18)
IMM GRANULOCYTES # BLD AUTO: 0.02 K/UL (ref 0–0.04)
IMM GRANULOCYTES NFR BLD AUTO: 0.3 % (ref 0–0.5)
IRON SERPL-MCNC: 108 UG/DL (ref 45–160)
LDLC SERPL CALC-MCNC: 86.8 MG/DL (ref 63–159)
LYMPHOCYTES # BLD AUTO: 1.9 K/UL (ref 1–4.8)
LYMPHOCYTES NFR BLD: 28 % (ref 18–48)
MCH RBC QN AUTO: 31.3 PG (ref 27–31)
MCHC RBC AUTO-ENTMCNC: 34 G/DL (ref 32–36)
MCV RBC AUTO: 92 FL (ref 82–98)
MONOCYTES # BLD AUTO: 0.8 K/UL (ref 0.3–1)
MONOCYTES NFR BLD: 12.4 % (ref 4–15)
NEUTROPHILS # BLD AUTO: 3.7 K/UL (ref 1.8–7.7)
NEUTROPHILS NFR BLD: 55.6 % (ref 38–73)
NONHDLC SERPL-MCNC: 110 MG/DL
NRBC BLD-RTO: 0 /100 WBC
PLATELET # BLD AUTO: 162 K/UL (ref 150–450)
PMV BLD AUTO: 9.7 FL (ref 9.2–12.9)
POTASSIUM SERPL-SCNC: 4.2 MMOL/L (ref 3.5–5.1)
PROT SERPL-MCNC: 6.9 G/DL (ref 6–8.4)
RBC # BLD AUTO: 4.5 M/UL (ref 4.6–6.2)
SATURATED IRON: 30 % (ref 20–50)
SODIUM SERPL-SCNC: 139 MMOL/L (ref 136–145)
TOTAL IRON BINDING CAPACITY: 364 UG/DL (ref 250–450)
TRANSFERRIN SERPL-MCNC: 260 MG/DL (ref 200–375)
TRIGL SERPL-MCNC: 116 MG/DL (ref 30–150)
TSH SERPL DL<=0.005 MIU/L-ACNC: 3.9 UIU/ML (ref 0.34–5.6)
VIT B12 SERPL-MCNC: >1500 PG/ML (ref 210–950)
WBC # BLD AUTO: 6.67 K/UL (ref 3.9–12.7)

## 2023-10-11 PROCEDURE — 82728 ASSAY OF FERRITIN: CPT | Performed by: FAMILY MEDICINE

## 2023-10-11 PROCEDURE — 83540 ASSAY OF IRON: CPT | Performed by: FAMILY MEDICINE

## 2023-10-11 PROCEDURE — 82746 ASSAY OF FOLIC ACID SERUM: CPT | Performed by: FAMILY MEDICINE

## 2023-10-11 PROCEDURE — 84443 ASSAY THYROID STIM HORMONE: CPT | Performed by: FAMILY MEDICINE

## 2023-10-11 PROCEDURE — 83036 HEMOGLOBIN GLYCOSYLATED A1C: CPT | Performed by: FAMILY MEDICINE

## 2023-10-11 PROCEDURE — 36415 COLL VENOUS BLD VENIPUNCTURE: CPT | Performed by: FAMILY MEDICINE

## 2023-10-11 PROCEDURE — 85025 COMPLETE CBC W/AUTO DIFF WBC: CPT | Performed by: FAMILY MEDICINE

## 2023-10-11 PROCEDURE — 84466 ASSAY OF TRANSFERRIN: CPT | Performed by: FAMILY MEDICINE

## 2023-10-11 PROCEDURE — 80061 LIPID PANEL: CPT | Performed by: FAMILY MEDICINE

## 2023-10-11 PROCEDURE — 80053 COMPREHEN METABOLIC PANEL: CPT | Performed by: FAMILY MEDICINE

## 2023-10-11 PROCEDURE — 82607 VITAMIN B-12: CPT | Performed by: FAMILY MEDICINE

## 2023-10-13 ENCOUNTER — TELEPHONE (OUTPATIENT)
Dept: FAMILY MEDICINE | Facility: CLINIC | Age: 73
End: 2023-10-13
Payer: MEDICARE

## 2023-10-13 ENCOUNTER — OFFICE VISIT (OUTPATIENT)
Dept: UROLOGY | Facility: CLINIC | Age: 73
End: 2023-10-13
Payer: MEDICARE

## 2023-10-13 VITALS — HEIGHT: 72 IN | WEIGHT: 261 LBS | RESPIRATION RATE: 15 BRPM | BODY MASS INDEX: 35.35 KG/M2

## 2023-10-13 DIAGNOSIS — N40.1 BPH WITH OBSTRUCTION/LOWER URINARY TRACT SYMPTOMS: Primary | ICD-10-CM

## 2023-10-13 DIAGNOSIS — N13.8 BPH WITH OBSTRUCTION/LOWER URINARY TRACT SYMPTOMS: Primary | ICD-10-CM

## 2023-10-13 LAB — POC RESIDUAL URINE VOLUME: 56 ML (ref 0–100)

## 2023-10-13 PROCEDURE — 99999PBSHW POCT BLADDER SCAN: ICD-10-PCS | Mod: PBBFAC,,,

## 2023-10-13 PROCEDURE — 99213 OFFICE O/P EST LOW 20 MIN: CPT | Mod: PBBFAC,PO | Performed by: NURSE PRACTITIONER

## 2023-10-13 PROCEDURE — 99214 OFFICE O/P EST MOD 30 MIN: CPT | Mod: S$PBB,,, | Performed by: NURSE PRACTITIONER

## 2023-10-13 PROCEDURE — 99999PBSHW POCT BLADDER SCAN: Mod: PBBFAC,,,

## 2023-10-13 PROCEDURE — 99999 PR PBB SHADOW E&M-EST. PATIENT-LVL III: CPT | Mod: PBBFAC,,, | Performed by: NURSE PRACTITIONER

## 2023-10-13 PROCEDURE — 99214 PR OFFICE/OUTPT VISIT, EST, LEVL IV, 30-39 MIN: ICD-10-PCS | Mod: S$PBB,,, | Performed by: NURSE PRACTITIONER

## 2023-10-13 PROCEDURE — 51798 US URINE CAPACITY MEASURE: CPT | Mod: PBBFAC,PO | Performed by: NURSE PRACTITIONER

## 2023-10-13 PROCEDURE — 99999 PR PBB SHADOW E&M-EST. PATIENT-LVL III: ICD-10-PCS | Mod: PBBFAC,,, | Performed by: NURSE PRACTITIONER

## 2023-10-13 NOTE — PROGRESS NOTES
CHIEF COMPLAINT:    Mr. Ross is a 73 y.o. male presenting for f/u BPH  PRESENTING ILLNESS:    Aaron Ross is a 73 y.o. male who presents for f/u BPH. Last clinic visit was 23 with Dr. Reyes    Last seen by Dr Lemon on 23 noting  Obstructive sleep apnea using his BiPAP regularly.  BPH with nocturia.  PSA 1.1.  Check is due again.  Nocturia 2 times a night.  Status post right knee replacement. On aricept, refilled. As well had been on flomax 0.4 and was advised to increase to 0.8mg  Recent ER visit 23 for contusion of foot.  Notes he has a hairline fracture.     He presents today noting  Has not increased dose of flomax yet  Has been on it about 3 years, and initially worked well.  Now does not.  Also been on aricept about 4 years.   Father had prostate cancer diagnosed late in his 70s or 80s and lived into his 90s. He did have radiation though and complications from it.  He did have bladder ultrasound in 2020 noting: The prevoid bladder volume is 330 cubic cm.  The postvoid bladder volume is 60 cubic cm.  The prostate gland measures 3.8 x 4.0 x 3.6 cm.  - Pt reports that he did void 3 different times before getting to that PVR noting to empty about a 3rd each time.  Before the final was documented.  AUA SS:   (5: Emptying, intermittency; 4: Weak stream; 3: Frequency, straining; 2: Sleeping; 1: Urgency).  Medication helps 5/10.  Again noting that it helps a lot at  but the effects have diminished.  Feels the incomplete emptying b  He did have updated labs this AM from primary care:  Today: PSA 1.2, remainder as below.   0-2 soft drinks per day. When increased to 2, urgency worse, so replaced with tea which is better from urgency standpoint. But went to water.  Would like to be off bp meds, notes has had issues at times with it being low.       10/13/23  S/p urolift, 6 total implants  AUA SS: Feeling of incomplete Emptyin, Frequency: 2, Intermittency: 1, Urgency: 3, Weak Stream:  2, Strainin, Sleepin, Total SS: 15 , Quality of Life: 3  Urgency is most bothersome to pt.  PVR 56 ml  Pt reports burning to tip of penis with voiding that has improved since catheter removal. Does not occur with every void. Denies gross hematuria, flank pain, fever, chills, nausea or vomiting.  Pt is still taking flomax 0.4 mg every evening as suggested by his PCP.    Lab Results   Component Value Date    LABURIN No growth 2023    LABURIN (A) 2017      Comment:        CULTURE, URINE, ROUTINE         MICRO NUMBER:      [id number]    TEST STATUS:       FINAL    SPECIMEN SOURCE:   URINE    SPECIMEN QUALITY:  ADEQUATE    RESULT:            10,000-50,000 CFU/mL of Lactobacillus species                       May represent colonizers from external and                       internal genitalia. No further testing (including                       susceptibility) will be performed.    COMMENT:           Additional organism(s) less than 10,000 CFU/mL                       isolated. These organisms, commonly found on                       external and internal genitalia, are considered                       colonizers. No further testing performed.         REVIEW OF SYSTEMS:    Review of Systems    Constitutional: Negative for fever and chills.   HENT: Negative for hearing loss.   Eyes: Negative for visual disturbance.   Respiratory: Negative for shortness of breath.   Cardiovascular: Negative for chest pain.   Gastrointestinal: Negative for nausea, vomiting.  Genitourinary:  See above  Neurological: Negative for dizziness.   Hematological: Does not bruise/bleed easily.   Psychiatric/Behavioral: Negative for confusion.       PATIENT HISTORY:    Past Medical History:   Diagnosis Date    Arthritis     BMI 36.0-36.9,adult 2018    Hypertension     Sleep apnea     USES CPAP    Thyroid disease     Varicose veins of both lower extremities     Wears glasses        Past Surgical History:   Procedure Laterality  Date    BACK SURGERY      PAPAYA JUICE INJECTION/Chemopapain 40yrs ago.    CYSTOSCOPY N/A 07/25/2023    Procedure: CYSTOSCOPY;  Surgeon: Robe Reyes MD;  Location: [facility] OR;  Service: Urology;  Laterality: N/A;    CYSTOSCOPY WITH INSERTION OF MINIMALLY INVASIVE IMPLANT TO ENLARGE PROSTATIC URETHRA N/A 9/14/2023    Procedure: CYSTOSCOPY, WITH INSERTION OF UROLIFT IMPLANT;  Surgeon: Robe Reyes MD;  Location: [facility] OR;  Service: Urology;  Laterality: N/A;    FINGER MASS EXCISION Right 01/19/2023    Procedure: Right small finger mucoid cyst excision;  Surgeon: Anthony Edwards MD;  Location: [facility] OR;  Service: Orthopedics;  Laterality: Right;    JOINT REPLACEMENT Right 10/01/2012    JOINT REPLACEMENT Left     KNEE    KNEE SURGERY  2009    THYROID SURGERY  2013    PARTIAL    TRANSRECTAL ULTRASOUND EXAMINATION N/A 07/25/2023    Procedure: ULTRASOUND, RECTAL APPROACH;  Surgeon: Robe Reyes MD;  Location: [facility] OR;  Service: Urology;  Laterality: N/A;    UVULA  2000    REMOVED FOR SLEEP APNEA       History reviewed. No pertinent family history.    Social History     Socioeconomic History    Marital status:    Occupational History    Occupation: RETIRED   Tobacco Use    Smoking status: Never    Smokeless tobacco: Never   Substance and Sexual Activity    Alcohol use: Yes     Comment: RARELY    Drug use: No    Sexual activity: Yes     Partners: Female       Allergies:  Adhesive tape-silicones    Medications:    Current Outpatient Medications:     aspirin (ECOTRIN) 81 MG EC tablet, Take 81 mg by mouth once daily., Disp: , Rfl:     donepeziL (ARICEPT) 10 MG tablet, Take 1 tablet (10 mg total) by mouth once daily., Disp: 90 tablet, Rfl: 3    irbesartan (AVAPRO) 75 MG tablet, Take 1 tablet (75 mg total) by mouth once daily., Disp: 90 tablet, Rfl: 3    ketoconazole (NIZORAL) 2 % shampoo, Apply 1 Applicatorful topically every other day., Disp: 480 mL, Rfl: 3    potassium chloride SA  (K-DUR,KLOR-CON M) 10 MEQ tablet, Take 1 tablet (10 mEq total) by mouth once daily., Disp: 90 tablet, Rfl: 1    tamsulosin (FLOMAX) 0.4 mg Cap, nightly, Disp: 180 capsule, Rfl: 1    PHYSICAL EXAMINATION:    Constitutional: He is oriented to person, place, and time. He appears well-developed and well-nourished.  He is in no apparent distress.    Neck: Normal ROM.     Cardiovascular: Normal rate.      Pulmonary/Chest: Effort normal. No respiratory distress.     Abdominal:  He exhibits no distension.  There is no CVA tenderness.     Neurological: He is alert and oriented to person, place, and time.     Skin: Skin is warm and dry.     Psych: Cooperative with normal affect.    Genitourinary: The penis is circumcised. The urethral meatus is normal. No erythema or irritation to meatus.    Physical Exam    LABS:    Lab Results   Component Value Date    PSA 1.2 06/12/2023    PSADIAG 1.10 10/11/2021    PSADIAG 0.9 01/24/2020    PSADIAG 1.0 11/30/2017     Lab Results   Component Value Date    CREATININE 1.1 10/11/2023         IMPRESSION:    Encounter Diagnoses   Name Primary?    BPH with obstruction/lower urinary tract symptoms Yes       PLAN:  -Reassured pt he is emptying his bladder  -Pt had improvement to LUTS s/p urolift based on AUASS.   Stop flomax.  Continue conservative measures for LUTS  If LUTS does not improve by follow up, can schedule uroflow and possibly start OAB medication if bothered by urgency.  -Pt reports burning to tip of penis with voiding is improving and pt prefers to continue to monitor symptoms. If burning worsens, will notify me in clinic. Good water intake to keep urine dilute.  -RTC 8 weeks     I encouraged him or any of his family members to call or email me with questions and/or concerns.      30 minutes of total time spent on the encounter, which includes face to face time and non-face to face time preparing to see the patient (eg, review of tests), Obtaining and/or reviewing separately obtained  history, Documenting clinical information in the electronic or other health record, Independently interpreting results (not separately reported) and communicating results to the patient/family/caregiver, or Care coordination (not separately reported).

## 2023-10-13 NOTE — PATIENT INSTRUCTIONS
Stop flomax    Discussed conservative measures to control urgency and frequency including   1. Avoiding/minimizing bladder irritants (see below), especially in afternoon and evening hours    Discussed bladder irritants include coffe (even decaf), tea, alcohol, soda, spicy foods, acidic juices (orange, tomato), vinegar, and artificial sweeteners/sugary beverages.    2. timed voiding - empty on a schedule (approx ~2-3 hours) in spite of need to urinate, to get ahead of urge    3. dont postponing voiding - dont hold it on purpose     4. bowel regimen as distended bowel has extrinsic compressive effect on bladder.   - any or all of the following in any combination, titrate to soft daily bowel movement without pushing or straining  - colace/stool softener capsule - once to twice daily  - miralax - 1 capful daily to start, can increase to 2x daily (or decrease to 1/2 cap daily)  - increase dietary fibery  - fiber supplements, such as metamucil  - prunes, prune juice    5. INCREASE water intake    6. Stop fluids 2 hours before bed, and urinate just before bed

## 2023-12-08 ENCOUNTER — OFFICE VISIT (OUTPATIENT)
Dept: UROLOGY | Facility: CLINIC | Age: 73
End: 2023-12-08
Payer: MEDICARE

## 2023-12-08 ENCOUNTER — OFFICE VISIT (OUTPATIENT)
Dept: FAMILY MEDICINE | Facility: CLINIC | Age: 73
End: 2023-12-08
Payer: MEDICARE

## 2023-12-08 ENCOUNTER — LAB VISIT (OUTPATIENT)
Dept: LAB | Facility: HOSPITAL | Age: 73
End: 2023-12-08
Attending: FAMILY MEDICINE
Payer: MEDICARE

## 2023-12-08 VITALS
HEART RATE: 56 BPM | BODY MASS INDEX: 36.23 KG/M2 | OXYGEN SATURATION: 97 % | DIASTOLIC BLOOD PRESSURE: 64 MMHG | WEIGHT: 267.5 LBS | HEIGHT: 72 IN | SYSTOLIC BLOOD PRESSURE: 124 MMHG | TEMPERATURE: 98 F

## 2023-12-08 DIAGNOSIS — N40.1 BPH WITH OBSTRUCTION/LOWER URINARY TRACT SYMPTOMS: Primary | ICD-10-CM

## 2023-12-08 DIAGNOSIS — E78.5 HYPERLIPIDEMIA, UNSPECIFIED HYPERLIPIDEMIA TYPE: ICD-10-CM

## 2023-12-08 DIAGNOSIS — I25.10 ARTERIOSCLEROTIC CARDIOVASCULAR DISEASE: ICD-10-CM

## 2023-12-08 DIAGNOSIS — I10 HYPERTENSION, ESSENTIAL: ICD-10-CM

## 2023-12-08 DIAGNOSIS — Z79.82 ASPIRIN LONG-TERM USE: ICD-10-CM

## 2023-12-08 DIAGNOSIS — R42 DIZZINESS: ICD-10-CM

## 2023-12-08 DIAGNOSIS — R73.03 PREDIABETES: ICD-10-CM

## 2023-12-08 DIAGNOSIS — G47.33 OSA TREATED WITH BIPAP: ICD-10-CM

## 2023-12-08 DIAGNOSIS — I10 HYPERTENSION, ESSENTIAL: Primary | ICD-10-CM

## 2023-12-08 DIAGNOSIS — N40.0 BENIGN PROSTATIC HYPERPLASIA, UNSPECIFIED WHETHER LOWER URINARY TRACT SYMPTOMS PRESENT: ICD-10-CM

## 2023-12-08 DIAGNOSIS — N13.8 BPH WITH OBSTRUCTION/LOWER URINARY TRACT SYMPTOMS: Primary | ICD-10-CM

## 2023-12-08 DIAGNOSIS — K64.4 BLEEDING EXTERNAL HEMORRHOIDS: ICD-10-CM

## 2023-12-08 LAB
ALBUMIN SERPL BCP-MCNC: 4.2 G/DL (ref 3.5–5.2)
ALP SERPL-CCNC: 96 U/L (ref 55–135)
ALT SERPL W/O P-5'-P-CCNC: 14 U/L (ref 10–44)
ANION GAP SERPL CALC-SCNC: 3 MMOL/L (ref 8–16)
AST SERPL-CCNC: 17 U/L (ref 10–40)
BASOPHILS # BLD AUTO: 0.03 K/UL (ref 0–0.2)
BASOPHILS NFR BLD: 0.4 % (ref 0–1.9)
BILIRUB SERPL-MCNC: 0.5 MG/DL (ref 0.1–1)
BUN SERPL-MCNC: 17 MG/DL (ref 8–23)
CALCIUM SERPL-MCNC: 9.8 MG/DL (ref 8.7–10.5)
CHLORIDE SERPL-SCNC: 105 MMOL/L (ref 95–110)
CO2 SERPL-SCNC: 30 MMOL/L (ref 23–29)
CREAT SERPL-MCNC: 1.1 MG/DL (ref 0.5–1.4)
DIFFERENTIAL METHOD BLD: ABNORMAL
EOSINOPHIL # BLD AUTO: 0.1 K/UL (ref 0–0.5)
EOSINOPHIL NFR BLD: 1.8 % (ref 0–8)
ERYTHROCYTE [DISTWIDTH] IN BLOOD BY AUTOMATED COUNT: 12.9 % (ref 11.5–14.5)
EST. GFR  (NO RACE VARIABLE): >60 ML/MIN/1.73 M^2
GLUCOSE SERPL-MCNC: 72 MG/DL (ref 70–110)
HCT VFR BLD AUTO: 43.2 % (ref 40–54)
HGB BLD-MCNC: 14.2 G/DL (ref 14–18)
IMM GRANULOCYTES # BLD AUTO: 0.03 K/UL (ref 0–0.04)
IMM GRANULOCYTES NFR BLD AUTO: 0.4 % (ref 0–0.5)
LYMPHOCYTES # BLD AUTO: 2.3 K/UL (ref 1–4.8)
LYMPHOCYTES NFR BLD: 31.2 % (ref 18–48)
MCH RBC QN AUTO: 31.5 PG (ref 27–31)
MCHC RBC AUTO-ENTMCNC: 32.9 G/DL (ref 32–36)
MCV RBC AUTO: 96 FL (ref 82–98)
MONOCYTES # BLD AUTO: 1 K/UL (ref 0.3–1)
MONOCYTES NFR BLD: 13.3 % (ref 4–15)
NEUTROPHILS # BLD AUTO: 3.9 K/UL (ref 1.8–7.7)
NEUTROPHILS NFR BLD: 52.9 % (ref 38–73)
NRBC BLD-RTO: 0 /100 WBC
PLATELET # BLD AUTO: 182 K/UL (ref 150–450)
PMV BLD AUTO: 10 FL (ref 9.2–12.9)
POC RESIDUAL URINE VOLUME: 0 ML (ref 0–100)
POTASSIUM SERPL-SCNC: 4.4 MMOL/L (ref 3.5–5.1)
PROT SERPL-MCNC: 6.9 G/DL (ref 6–8.4)
RBC # BLD AUTO: 4.51 M/UL (ref 4.6–6.2)
SODIUM SERPL-SCNC: 138 MMOL/L (ref 136–145)
WBC # BLD AUTO: 7.28 K/UL (ref 3.9–12.7)

## 2023-12-08 PROCEDURE — 99213 OFFICE O/P EST LOW 20 MIN: CPT | Mod: PBBFAC,PO | Performed by: NURSE PRACTITIONER

## 2023-12-08 PROCEDURE — 99214 PR OFFICE/OUTPT VISIT, EST, LEVL IV, 30-39 MIN: ICD-10-PCS | Mod: S$PBB,,, | Performed by: NURSE PRACTITIONER

## 2023-12-08 PROCEDURE — 80053 COMPREHEN METABOLIC PANEL: CPT | Performed by: FAMILY MEDICINE

## 2023-12-08 PROCEDURE — 51798 US URINE CAPACITY MEASURE: CPT | Mod: PBBFAC,PO | Performed by: NURSE PRACTITIONER

## 2023-12-08 PROCEDURE — 99999PBSHW POCT BLADDER SCAN: ICD-10-PCS | Mod: PBBFAC,,,

## 2023-12-08 PROCEDURE — 99999 PR PBB SHADOW E&M-EST. PATIENT-LVL III: CPT | Mod: PBBFAC,,, | Performed by: NURSE PRACTITIONER

## 2023-12-08 PROCEDURE — 85025 COMPLETE CBC W/AUTO DIFF WBC: CPT | Performed by: FAMILY MEDICINE

## 2023-12-08 PROCEDURE — 99214 OFFICE O/P EST MOD 30 MIN: CPT | Mod: S$GLB,,, | Performed by: FAMILY MEDICINE

## 2023-12-08 PROCEDURE — 36415 COLL VENOUS BLD VENIPUNCTURE: CPT | Performed by: FAMILY MEDICINE

## 2023-12-08 PROCEDURE — 99214 PR OFFICE/OUTPT VISIT, EST, LEVL IV, 30-39 MIN: ICD-10-PCS | Mod: S$GLB,,, | Performed by: FAMILY MEDICINE

## 2023-12-08 PROCEDURE — 99214 OFFICE O/P EST MOD 30 MIN: CPT | Mod: S$PBB,,, | Performed by: NURSE PRACTITIONER

## 2023-12-08 PROCEDURE — 99999PBSHW POCT BLADDER SCAN: Mod: PBBFAC,,,

## 2023-12-08 PROCEDURE — 99999 PR PBB SHADOW E&M-EST. PATIENT-LVL III: ICD-10-PCS | Mod: PBBFAC,,, | Performed by: NURSE PRACTITIONER

## 2023-12-08 NOTE — PROGRESS NOTES
CHIEF COMPLAINT:    Mr. Ross is a 73 y.o. male presenting for f/u BPH  PRESENTING ILLNESS:    Aaron Ross is a 73 y.o. male who presents for f/u BPH. Last clinic visit was 10/13/23    Last seen by Dr Lemon on 23 noting  Obstructive sleep apnea using his BiPAP regularly.  BPH with nocturia.  PSA 1.1.  Check is due again.  Nocturia 2 times a night.  Status post right knee replacement. On aricept, refilled. As well had been on flomax 0.4 and was advised to increase to 0.8mg  Recent ER visit 23 for contusion of foot.  Notes he has a hairline fracture.     He presents today noting  Has not increased dose of flomax yet  Has been on it about 3 years, and initially worked well.  Now does not.  Also been on aricept about 4 years.   Father had prostate cancer diagnosed late in his 70s or 80s and lived into his 90s. He did have radiation though and complications from it.  He did have bladder ultrasound in 2020 noting: The prevoid bladder volume is 330 cubic cm.  The postvoid bladder volume is 60 cubic cm.  The prostate gland measures 3.8 x 4.0 x 3.6 cm.  - Pt reports that he did void 3 different times before getting to that PVR noting to empty about a 3rd each time.  Before the final was documented.  AUA SS:   (5: Emptying, intermittency; 4: Weak stream; 3: Frequency, straining; 2: Sleeping; 1: Urgency).  Medication helps 5/10.  Again noting that it helps a lot at 1st but the effects have diminished.  Feels the incomplete emptying b  He did have updated labs this AM from primary care:  Today: PSA 1.2, remainder as below.   0-2 soft drinks per day. When increased to 2, urgency worse, so replaced with tea which is better from urgency standpoint. But went to water.  Would like to be off bp meds, notes has had issues at times with it being low.       10/13/23  S/p urolift, 6 total implants  AUA SS: Feeling of incomplete Emptyin, Frequency: 2, Intermittency: 1, Urgency: 3, Weak Stream: 2, Straining:  1, Sleepin, Total SS: 15 , Quality of Life: 3  Urgency is most bothersome to pt.  PVR 56 ml  Pt reports burning to tip of penis with voiding that has improved since catheter removal. Does not occur with every void. Denies gross hematuria, flank pain, fever, chills, nausea or vomiting.  Pt is still taking flomax 0.4 mg every evening as suggested by his PCP.    23  Pt presents to clinic for f/u BPH.  AUA SS: Feeling of incomplete Emptyin, Frequency: 4, Intermittency: 2, Urgency: 2, Weak Stream: 2, Strainin, Sleepin, Total SS: 15 , Quality of Life: 3  Stopped flomax after last visit.  Pt states frequency and urgency are better since Urolift but still occurring  He is having issues with constipation, BM every 3 days and hemorrhoids. He is scheduling appt with CRS to discuss.  Denies dysuria, gross hematuria, flank pain, fever, chills, nausea or vomiting  PVR 0 ml        Lab Results   Component Value Date    LABURIN No growth 2023    LABURIN (A) 2017      Comment:        CULTURE, URINE, ROUTINE         MICRO NUMBER:      [id number]    TEST STATUS:       FINAL    SPECIMEN SOURCE:   URINE    SPECIMEN QUALITY:  ADEQUATE    RESULT:            10,000-50,000 CFU/mL of Lactobacillus species                       May represent colonizers from external and                       internal genitalia. No further testing (including                       susceptibility) will be performed.    COMMENT:           Additional organism(s) less than 10,000 CFU/mL                       isolated. These organisms, commonly found on                       external and internal genitalia, are considered                       colonizers. No further testing performed.         REVIEW OF SYSTEMS:    Review of Systems    Constitutional: Negative for fever and chills.   HENT: Negative for hearing loss.   Eyes: Negative for visual disturbance.   Respiratory: Negative for shortness of breath.   Cardiovascular: Negative for  chest pain.   Gastrointestinal: Negative for nausea, vomiting.  Genitourinary:  See above  Neurological: Negative for dizziness.   Hematological: Does not bruise/bleed easily.   Psychiatric/Behavioral: Negative for confusion.       PATIENT HISTORY:    Past Medical History:   Diagnosis Date    Arthritis     BMI 36.0-36.9,adult 06/25/2018    Hypertension     Sleep apnea     USES CPAP    Thyroid disease     Varicose veins of both lower extremities     Wears glasses        Past Surgical History:   Procedure Laterality Date    BACK SURGERY      PAPAYA JUICE INJECTION/Chemopapain 40yrs ago.    CYSTOSCOPY N/A 07/25/2023    Procedure: CYSTOSCOPY;  Surgeon: Robe Reyes MD;  Location: [facility] OR;  Service: Urology;  Laterality: N/A;    CYSTOSCOPY WITH INSERTION OF MINIMALLY INVASIVE IMPLANT TO ENLARGE PROSTATIC URETHRA N/A 9/14/2023    Procedure: CYSTOSCOPY, WITH INSERTION OF UROLIFT IMPLANT;  Surgeon: Robe Reyes MD;  Location: [facility] OR;  Service: Urology;  Laterality: N/A;    FINGER MASS EXCISION Right 01/19/2023    Procedure: Right small finger mucoid cyst excision;  Surgeon: Anthony Edwards MD;  Location: [facility] OR;  Service: Orthopedics;  Laterality: Right;    JOINT REPLACEMENT Right 10/01/2012    JOINT REPLACEMENT Left     KNEE    KNEE SURGERY  2009    THYROID SURGERY  2013    PARTIAL    TRANSRECTAL ULTRASOUND EXAMINATION N/A 07/25/2023    Procedure: ULTRASOUND, RECTAL APPROACH;  Surgeon: Robe Reyes MD;  Location: [facility] OR;  Service: Urology;  Laterality: N/A;    UVULA  2000    REMOVED FOR SLEEP APNEA       No family history on file.    Social History     Socioeconomic History    Marital status:    Occupational History    Occupation: RETIRED   Tobacco Use    Smoking status: Never    Smokeless tobacco: Never   Substance and Sexual Activity    Alcohol use: Yes     Comment: RARELY    Drug use: No    Sexual activity: Yes     Partners: Female       Allergies:  Adhesive  tape-silicones    Medications:    Current Outpatient Medications:     aspirin (ECOTRIN) 81 MG EC tablet, Take 81 mg by mouth once daily., Disp: , Rfl:     donepeziL (ARICEPT) 10 MG tablet, Take 1 tablet (10 mg total) by mouth once daily., Disp: 90 tablet, Rfl: 3    irbesartan (AVAPRO) 75 MG tablet, Take 1 tablet (75 mg total) by mouth once daily., Disp: 90 tablet, Rfl: 3    ketoconazole (NIZORAL) 2 % shampoo, Apply 1 Applicatorful topically every other day., Disp: 480 mL, Rfl: 3    potassium chloride SA (K-DUR,KLOR-CON M) 10 MEQ tablet, Take 1 tablet (10 mEq total) by mouth once daily., Disp: 90 tablet, Rfl: 1    PHYSICAL EXAMINATION:    Constitutional: He is oriented to person, place, and time. He appears well-developed and well-nourished.  He is in no apparent distress.    Neck: Normal ROM.     Cardiovascular: Normal rate.      Pulmonary/Chest: Effort normal. No respiratory distress.     Abdominal:  He exhibits no distension.  There is no CVA tenderness.     Neurological: He is alert and oriented to person, place, and time.     Skin: Skin is warm and dry.     Psych: Cooperative with normal affect.      Physical Exam    LABS:    Lab Results   Component Value Date    PSA 1.2 06/12/2023    PSADIAG 1.10 10/11/2021    PSADIAG 0.9 01/24/2020    PSADIAG 1.0 11/30/2017     Lab Results   Component Value Date    CREATININE 1.1 10/11/2023         IMPRESSION:    Encounter Diagnoses   Name Primary?    BPH with obstruction/lower urinary tract symptoms Yes       PLAN:  -Discussed LUTS  Offered to start Myrbetriq 25 mg but patient prefers to hold off at this time and take care of GI issues first. Would avoid anticholinergics d/t chronic constipation and age.  Urinary frequency and urgency are better and not bothersome at this time per pt.  Information given on AVS regarding Myrbetriq  -Continue conservative measures to control urgency and frequency including   1. Avoiding/minimizing bladder irritants (see below), especially in  afternoon and evening hours  Discussed bladder irritants include coffe (even decaf), tea, alcohol, soda, spicy foods, acidic juices (orange, tomato), vinegar, and artificial sweeteners/sugary beverages.  2. timed voiding - empty on a schedule (approx ~2-3 hours) in spite of need to urinate, to get ahead of urge  3. dont postponing voiding - dont hold it on purpose   4. bowel regimen as distended bowel has extrinsic compressive effect on bladder.   - any or all of the following in any combination, titrate to soft daily bowel movement without pushing or straining  - colace/stool softener capsule - once to twice daily  - miralax - 1 capful daily to start, can increase to 2x daily (or decrease to 1/2 cap daily)  - increase dietary fibery  - fiber supplements, such as metamucil  - prunes, prune juice  5. INCREASE water intake  6. Stop fluids 2 hours before bed, and urinate just before bed    -RTC 3 months. Pt will call to schedule since he wants to meet with GI/CRS first and get constipation and hemorrhoids treated before starting OAB medication to see if that improves symptoms. If LUTS worsens, pt will call for sooner appt     I encouraged him or any of his family members to call or email me with questions and/or concerns.      30 minutes of total time spent on the encounter, which includes face to face time and non-face to face time preparing to see the patient (eg, review of tests), Obtaining and/or reviewing separately obtained history, Documenting clinical information in the electronic or other health record, Independently interpreting results (not separately reported) and communicating results to the patient/family/caregiver, or Care coordination (not separately reported).

## 2023-12-10 NOTE — PROGRESS NOTES
Subjective:       Patient ID: Aaron Ross is a 73 y.o. male.    Chief Complaint: Follow-up    Having some dizziness upon standing.  Has been having some rectal bleeding from hemorrhoids.  Will have alternating constipation for 3 days or so then rectal bleeding.  Has BPH and nocturia.  Gets up 1 or 2 times a night.  No residual urine on his bladder ultrasound.  Urology check today.  Hypertension controlled.  Has hyperlipidemia.  ASCVD no claudication.  Using aspirin.  Prediabetic.  Obstructive sleep apnea using BiPAP regularly and benefitting from it.    Physical examination.  Vital signs noted.  Neck without bruit.  Chest clear.  Heart regular rate and rhythm.  Abdomen bowel sounds are positive soft and nontender.  Extremities without edema positive pedal pulses.        Objective:        Assessment:       1. Hypertension, essential    2. Arteriosclerotic cardiovascular disease    3. Hyperlipidemia, unspecified hyperlipidemia type    4. Dizziness    5. Benign prostatic hyperplasia, unspecified whether lower urinary tract symptoms present    6. Aspirin long-term use    7. Prediabetes    8. SIMON treated with BiPAP    9. Bleeding external hemorrhoids        Plan:       Hypertension, essential  -     Comprehensive Metabolic Panel; Future; Expected date: 12/08/2023    Arteriosclerotic cardiovascular disease    Hyperlipidemia, unspecified hyperlipidemia type    Dizziness    Benign prostatic hyperplasia, unspecified whether lower urinary tract symptoms present    Aspirin long-term use  -     CBC Auto Differential; Future; Expected date: 12/08/2023    Prediabetes    SIMON treated with BiPAP    Bleeding external hemorrhoids      RSV vaccine recommended.  Refer him back to his gastroenterologist for banding of the hemorrhoids.  CBC and CMP ordered.  Hold his Avapro since he is dizzy.  He will need all new prescription sent in January since he is having an insurance/pharmacy change.

## 2023-12-29 ENCOUNTER — LAB VISIT (OUTPATIENT)
Dept: LAB | Facility: HOSPITAL | Age: 73
End: 2023-12-29
Attending: FAMILY MEDICINE
Payer: MEDICARE

## 2023-12-29 ENCOUNTER — OFFICE VISIT (OUTPATIENT)
Dept: FAMILY MEDICINE | Facility: CLINIC | Age: 73
End: 2023-12-29
Payer: MEDICARE

## 2023-12-29 VITALS
DIASTOLIC BLOOD PRESSURE: 62 MMHG | WEIGHT: 267.19 LBS | HEART RATE: 58 BPM | TEMPERATURE: 98 F | SYSTOLIC BLOOD PRESSURE: 132 MMHG | OXYGEN SATURATION: 97 % | HEIGHT: 72 IN | BODY MASS INDEX: 36.19 KG/M2

## 2023-12-29 DIAGNOSIS — G47.33 OSA TREATED WITH BIPAP: ICD-10-CM

## 2023-12-29 DIAGNOSIS — I10 HYPERTENSION, ESSENTIAL: Primary | ICD-10-CM

## 2023-12-29 DIAGNOSIS — I10 HYPERTENSION, ESSENTIAL: ICD-10-CM

## 2023-12-29 LAB
T4 FREE SERPL-MCNC: 0.7 NG/DL (ref 0.71–1.51)
TSH SERPL DL<=0.005 MIU/L-ACNC: 2.54 UIU/ML (ref 0.34–5.6)

## 2023-12-29 PROCEDURE — 84443 ASSAY THYROID STIM HORMONE: CPT | Performed by: FAMILY MEDICINE

## 2023-12-29 PROCEDURE — 84439 ASSAY OF FREE THYROXINE: CPT | Performed by: FAMILY MEDICINE

## 2023-12-29 PROCEDURE — 99213 PR OFFICE/OUTPT VISIT, EST, LEVL III, 20-29 MIN: ICD-10-PCS | Mod: S$GLB,,, | Performed by: FAMILY MEDICINE

## 2023-12-29 PROCEDURE — 36415 COLL VENOUS BLD VENIPUNCTURE: CPT | Performed by: FAMILY MEDICINE

## 2023-12-29 PROCEDURE — 99213 OFFICE O/P EST LOW 20 MIN: CPT | Mod: S$GLB,,, | Performed by: FAMILY MEDICINE

## 2023-12-30 NOTE — PROGRESS NOTES
Subjective:       Patient ID: Aaron Ross is a 73 y.o. male.    Chief Complaint: Follow-up (3 week )    Follow-up of hypertension off Avapro.  Orthostasis is gone now.  Much better with discontinuation without medication.  Obstructive sleep apnea using BiPAP regularly benefitting.  BMI of 36.2 down 3 lb.  Not dieting weight went down little bit concerned that he is dropped some weight.  But really fairly stable over the past year so.  Carbon dioxide 30.  CMP otherwise okay.  CBC normal.  TSH is normal but was 3.9 back in October A1c 5.6.    Physical examination.  Vital signs noted.  Neck without bruit.  Chest clear.  Heart regular rate and rhythm.        Objective:        Assessment:       1. Hypertension, essential    2. SIMON treated with BiPAP    3. BMI 36.0-36.9,adult        Plan:       Hypertension, essential  -     T4, Free; Future; Expected date: 12/29/2023  -     TSH; Future; Expected date: 01/12/2024    SIMON treated with BiPAP    BMI 36.0-36.9,adult    He is concerned about the weight loss since he is been eating poorly will check a free T4 and TSH.  Continue same medications.  Follow-up in 3 months.

## 2024-03-07 RX ORDER — DONEPEZIL HYDROCHLORIDE 10 MG/1
10 TABLET, FILM COATED ORAL DAILY
Qty: 90 TABLET | Refills: 0 | Status: SHIPPED | OUTPATIENT
Start: 2024-03-07

## 2024-04-01 ENCOUNTER — OFFICE VISIT (OUTPATIENT)
Dept: FAMILY MEDICINE | Facility: CLINIC | Age: 74
End: 2024-04-01
Payer: MEDICARE

## 2024-04-01 VITALS
HEART RATE: 65 BPM | OXYGEN SATURATION: 98 % | SYSTOLIC BLOOD PRESSURE: 128 MMHG | WEIGHT: 269 LBS | DIASTOLIC BLOOD PRESSURE: 72 MMHG | BODY MASS INDEX: 36.48 KG/M2 | TEMPERATURE: 99 F

## 2024-04-01 DIAGNOSIS — I10 HYPERTENSION, ESSENTIAL: ICD-10-CM

## 2024-04-01 DIAGNOSIS — M60.9 STATIN-INDUCED MYOSITIS: ICD-10-CM

## 2024-04-01 DIAGNOSIS — T46.6X5A STATIN-INDUCED MYOSITIS: ICD-10-CM

## 2024-04-01 DIAGNOSIS — Z79.82 ASPIRIN LONG-TERM USE: ICD-10-CM

## 2024-04-01 DIAGNOSIS — N40.0 BENIGN PROSTATIC HYPERPLASIA, UNSPECIFIED WHETHER LOWER URINARY TRACT SYMPTOMS PRESENT: ICD-10-CM

## 2024-04-01 DIAGNOSIS — E66.01 SEVERE OBESITY (BMI 35.0-35.9 WITH COMORBIDITY): ICD-10-CM

## 2024-04-01 DIAGNOSIS — R73.03 PREDIABETES: ICD-10-CM

## 2024-04-01 DIAGNOSIS — G47.33 OSA TREATED WITH BIPAP: Primary | ICD-10-CM

## 2024-04-01 PROBLEM — E78.5 HYPERLIPIDEMIA: Status: RESOLVED | Noted: 2019-04-06 | Resolved: 2024-04-01

## 2024-04-01 PROCEDURE — 99214 OFFICE O/P EST MOD 30 MIN: CPT | Mod: S$PBB,,, | Performed by: FAMILY MEDICINE

## 2024-04-01 PROCEDURE — 99214 OFFICE O/P EST MOD 30 MIN: CPT | Mod: PBBFAC,PN | Performed by: FAMILY MEDICINE

## 2024-04-01 PROCEDURE — 99999 PR PBB SHADOW E&M-EST. PATIENT-LVL IV: CPT | Mod: PBBFAC,,, | Performed by: FAMILY MEDICINE

## 2024-04-01 PROCEDURE — G2211 COMPLEX E/M VISIT ADD ON: HCPCS | Mod: S$PBB,,, | Performed by: FAMILY MEDICINE

## 2024-04-01 RX ORDER — RESPIRATORY SYNCYTIAL VISUS VACCINE RECOMBINANT, ADJUVANTED 120MCG/0.5
KIT INTRAMUSCULAR
COMMUNITY
Start: 2023-12-14

## 2024-04-01 RX ORDER — HYDROCORTISONE 25 MG/G
CREAM TOPICAL
COMMUNITY
Start: 2024-03-28

## 2024-04-02 NOTE — PROGRESS NOTES
Subjective:       Patient ID: Aaron Ross is a 73 y.o. male.    Chief Complaint: Follow-up    Hypertension.  Under good control.  Has history of some elevated cholesterol.  Was on pravastatin but had statin myositis.  Using aspirin regularly prediabetic.  Obstructive sleep apnea on BiPAP.  Benefitting from it.  BPH.  Status post UroLift procedure.  Going to [place] soon would like to get back on his Flomax to help the nocturia and the urinary frequency during the day.    Physical examination.  Vital signs noted.  Neck without bruit.  Chest clear.  Heart regular rate rhythm.  Abdomen bowel sounds positive soft nontender.  Extremities without edema positive pedal pulses.        Objective:        Assessment:       1. SIMON treated with BiPAP    2. Aspirin long-term use    3. Hypertension, essential    4. Prediabetes    5. Statin-induced myositis    6. Benign prostatic hyperplasia, unspecified whether lower urinary tract symptoms present    7. BMI 36.0-36.9,adult    8. Severe obesity (BMI 35.0-35.9 with comorbidity)        Plan:       SIMON treated with BiPAP    Aspirin long-term use    Hypertension, essential  -     CT Cardiac Scoring; Future; Expected date: 04/01/2024    Prediabetes  -     CT Cardiac Scoring; Future; Expected date: 04/01/2024    Statin-induced myositis  -     CT Cardiac Scoring; Future; Expected date: 04/01/2024    Benign prostatic hyperplasia, unspecified whether lower urinary tract symptoms present    BMI 36.0-36.9,adult    Severe obesity (BMI 35.0-35.9 with comorbidity)    Recommend we get a coronary calcium score to see how aggressive we need to be regarding the borderline cholesterol given his intolerance of statins.  Resume his Flomax 0.4 HS he has some of this.  Diet and weight reduction.

## 2024-04-03 ENCOUNTER — HOSPITAL ENCOUNTER (OUTPATIENT)
Dept: RADIOLOGY | Facility: HOSPITAL | Age: 74
Discharge: HOME | End: 2024-04-03
Attending: FAMILY MEDICINE
Payer: MEDICARE

## 2024-04-03 DIAGNOSIS — M60.9 STATIN-INDUCED MYOSITIS: ICD-10-CM

## 2024-04-03 DIAGNOSIS — T46.6X5A STATIN-INDUCED MYOSITIS: ICD-10-CM

## 2024-04-03 DIAGNOSIS — I10 HYPERTENSION, ESSENTIAL: ICD-10-CM

## 2024-04-03 DIAGNOSIS — R73.03 PREDIABETES: ICD-10-CM

## 2024-04-03 PROCEDURE — 75571 CT HRT W/O DYE W/CA TEST: CPT | Mod: TC

## 2024-04-11 DIAGNOSIS — I25.10 ARTERIOSCLEROTIC CARDIOVASCULAR DISEASE: Primary | ICD-10-CM

## 2024-04-15 ENCOUNTER — TELEPHONE (OUTPATIENT)
Dept: FAMILY MEDICINE | Facility: CLINIC | Age: 74
End: 2024-04-15
Payer: MEDICARE

## 2024-04-15 NOTE — TELEPHONE ENCOUNTER
----- Message from Mica Scruggs sent at 4/15/2024 10:11 AM CDT -----  Type:  Patient Returning Call    Who Called:  pt  Who Left Message for Patient:  Lo  Does the patient know what this is regarding?:   yes  Best Call Back Number:  [phone] (home)     Additional Information:  please call and advise--thank you

## 2024-04-16 NOTE — TELEPHONE ENCOUNTER
Spoke with wife and let her know referral was put in for Cardiology and for patient to take 81 mg of aspirin daily

## 2024-06-19 ENCOUNTER — OFFICE VISIT (OUTPATIENT)
Dept: CARDIOLOGY | Facility: CLINIC | Age: 74
End: 2024-06-19
Payer: MEDICARE

## 2024-06-19 VITALS
WEIGHT: 267.88 LBS | DIASTOLIC BLOOD PRESSURE: 75 MMHG | OXYGEN SATURATION: 97 % | HEIGHT: 72 IN | BODY MASS INDEX: 36.28 KG/M2 | SYSTOLIC BLOOD PRESSURE: 140 MMHG | HEART RATE: 58 BPM

## 2024-06-19 DIAGNOSIS — R94.31 NONSPECIFIC ABNORMAL ELECTROCARDIOGRAM (ECG) (EKG): ICD-10-CM

## 2024-06-19 DIAGNOSIS — Z91.89 AT HIGH RISK FOR CARDIOVASCULAR DISEASE: ICD-10-CM

## 2024-06-19 DIAGNOSIS — R93.1 ELEVATED CORONARY ARTERY CALCIUM SCORE: Primary | ICD-10-CM

## 2024-06-19 DIAGNOSIS — R60.0 PEDAL EDEMA: ICD-10-CM

## 2024-06-19 PROCEDURE — 99204 OFFICE O/P NEW MOD 45 MIN: CPT | Mod: S$PBB,,, | Performed by: INTERNAL MEDICINE

## 2024-06-19 PROCEDURE — 99999 PR PBB SHADOW E&M-EST. PATIENT-LVL III: CPT | Mod: PBBFAC,,, | Performed by: INTERNAL MEDICINE

## 2024-06-19 PROCEDURE — 99213 OFFICE O/P EST LOW 20 MIN: CPT | Mod: PBBFAC,PN | Performed by: INTERNAL MEDICINE

## 2024-06-19 RX ORDER — ROSUVASTATIN CALCIUM 5 MG/1
5 TABLET, COATED ORAL DAILY
Qty: 90 TABLET | Refills: 3 | Status: SHIPPED | OUTPATIENT
Start: 2024-06-19 | End: 2025-06-19

## 2024-06-19 NOTE — PROGRESS NOTES
[facility]    Subjective:     Patient ID:  Aaron Ross is a 74 y.o. male patient here for evaluation Hyperlipidemia (Patient getting off pravastatin , discussed with dr Lemon . )      HPI:  74-year-old male here for evaluation of hyperlipidemia.  ASCVD risk score is around 15-20%.  Reports being active without exertional chest pain or shortness of breath.  Does report getting in out of body experience sometimes when he has walking she's not exercise limiting.  He had a CT cardiac calcium scoring done.  Reports he possibly had an angiogram done with physicians here.  No details found in our records or under legacy documents.    Review of Systems   All other systems reviewed and are negative.       Past Medical History:   Diagnosis Date    Arthritis     BMI 36.0-36.9,adult 06/25/2018    Hypertension     Sleep apnea     USES CPAP    Thyroid disease     Varicose veins of both lower extremities     Wears glasses        Past Surgical History:   Procedure Laterality Date    BACK SURGERY      PAPAYA JUICE INJECTION/Chemopapain 40yrs ago.    CYSTOSCOPY N/A 07/25/2023    Procedure: CYSTOSCOPY;  Surgeon: Robe Reyes MD;  Location: [facility] OR;  Service: Urology;  Laterality: N/A;    CYSTOSCOPY WITH INSERTION OF MINIMALLY INVASIVE IMPLANT TO ENLARGE PROSTATIC URETHRA N/A 9/14/2023    Procedure: CYSTOSCOPY, WITH INSERTION OF UROLIFT IMPLANT;  Surgeon: Robe Reyes MD;  Location: [facility] OR;  Service: Urology;  Laterality: N/A;    FINGER MASS EXCISION Right 01/19/2023    Procedure: Right small finger mucoid cyst excision;  Surgeon: Anthony Edwards MD;  Location: [facility] OR;  Service: Orthopedics;  Laterality: Right;    JOINT REPLACEMENT Right 10/01/2012    JOINT REPLACEMENT Left     KNEE    KNEE SURGERY  2009    THYROID SURGERY  2013    PARTIAL    TRANSRECTAL ULTRASOUND EXAMINATION N/A 07/25/2023    Procedure: ULTRASOUND, RECTAL APPROACH;  Surgeon:  Robe Reyes MD;  Location: [facility] OR;  Service: Urology;  Laterality: N/A;    UVULA  2000    REMOVED FOR SLEEP APNEA       No family history on file.    Social History     Socioeconomic History    Marital status:    Occupational History    Occupation: RETIRED   Tobacco Use    Smoking status: Never    Smokeless tobacco: Never   Substance and Sexual Activity    Alcohol use: Yes     Comment: RARELY    Drug use: No    Sexual activity: Yes     Partners: Female     Social Determinants of Health     Financial Resource Strain: Low Risk  (6/19/2024)    Overall Financial Resource Strain (CARDIA)     Difficulty of Paying Living Expenses: Not hard at all   Food Insecurity: No Food Insecurity (6/19/2024)    Hunger Vital Sign     Worried About Running Out of Food in the Last Year: Never true     Ran Out of Food in the Last Year: Never true   Physical Activity: Insufficiently Active (6/19/2024)    Exercise Vital Sign     Days of Exercise per Week: 1 day     Minutes of Exercise per Session: 30 min   Stress: No Stress Concern Present (6/19/2024)    [employer] - Occupational Stress Questionnaire     Feeling of Stress : Only a little   Housing Stability: Unknown (6/19/2024)    Housing Stability Vital Sign     Unable to Pay for Housing in the Last Year: No       Current Outpatient Medications   Medication Sig Dispense Refill    aspirin (ECOTRIN) 81 MG EC tablet Take 81 mg by mouth once daily.      donepeziL (ARICEPT) 10 MG tablet Take 1 tablet (10 mg total) by mouth once daily. 90 tablet 0    hydrocortisone 2.5 % cream SMARTSIG:Sparingly Rectally Twice Daily      ketoconazole (NIZORAL) 2 % shampoo Apply 1 Applicatorful topically every other day. 480 mL 3    AREXVY, PF, 120 mcg/0.5 mL SusR vaccine       potassium chloride SA (K-DUR,KLOR-CON M) 10 MEQ tablet Take 1 tablet (10 mEq total) by mouth once daily. 90 tablet 1    rosuvastatin (CRESTOR) 5 MG tablet Take 1 tablet (5 mg total) by mouth  once daily. 90 tablet 3     No current facility-administered medications for this visit.       Review of patient's allergies indicates:   Allergen Reactions    Adhesive tape-silicones Rash     Allergy to adhesive on gauze - paper & regular tape ok         Objective:        Vitals:    06/19/24 1321   BP: (!) 140/75   Pulse: (!) 58       Physical Exam  Vitals reviewed.   Constitutional:       Appearance: Normal appearance.   HENT:      Mouth/Throat:      Mouth: Mucous membranes are moist.   Eyes:      Extraocular Movements: Extraocular movements intact.      Pupils: Pupils are equal, round, and reactive to light.   Cardiovascular:      Rate and Rhythm: Normal rate and regular rhythm.      Pulses: Normal pulses.      Heart sounds: Normal heart sounds. No murmur heard.     No gallop.   Pulmonary:      Effort: Pulmonary effort is normal.      Breath sounds: Normal breath sounds.   Abdominal:      General: Bowel sounds are normal.      Palpations: Abdomen is soft.   Musculoskeletal:         General: Normal range of motion.      Cervical back: Normal range of motion.   Skin:     General: Skin is warm and dry.   Neurological:      General: No focal deficit present.      Mental Status: He is alert and oriented to person, place, and time.   Psychiatric:         Mood and Affect: Mood normal.         LIPIDS - LAST 2   Lab Results   Component Value Date    CHOL 147 10/11/2023    CHOL 105 (L) 06/12/2023    HDL 37 (L) 10/11/2023    HDL 34 (L) 06/12/2023    LDLCALC 86.8 10/11/2023    LDLCALC 54.8 (L) 06/12/2023    TRIG 116 10/11/2023    TRIG 81 06/12/2023    CHOLHDL 25.2 10/11/2023    CHOLHDL 32.4 06/12/2023       CBC - LAST 2  Lab Results   Component Value Date    WBC 7.28 12/08/2023    WBC 6.67 10/11/2023    RBC 4.51 (L) 12/08/2023    RBC 4.50 (L) 10/11/2023    HGB 14.2 12/08/2023    HGB 14.1 10/11/2023    HCT 43.2 12/08/2023    HCT 41.5 10/11/2023    MCV 96 12/08/2023    MCV 92 10/11/2023    MCH 31.5 (H) 12/08/2023    MCH 31.3  "(H) 10/11/2023    MCHC 32.9 12/08/2023    MCHC 34.0 10/11/2023    RDW 12.9 12/08/2023    RDW 13.3 10/11/2023     12/08/2023     10/11/2023    MPV 10.0 12/08/2023    MPV 9.7 10/11/2023    GRAN 3.9 12/08/2023    GRAN 52.9 12/08/2023    LYMPH 2.3 12/08/2023    LYMPH 31.2 12/08/2023    MONO 1.0 12/08/2023    MONO 13.3 12/08/2023    BASO 0.03 12/08/2023    BASO 0.04 10/11/2023    NRBC 0 12/08/2023    NRBC 0 10/11/2023       CHEMISTRY & LIVER FUNCTION - LAST 2  Lab Results   Component Value Date     12/08/2023     10/11/2023    K 4.4 12/08/2023    K 4.2 10/11/2023     12/08/2023     10/11/2023    CO2 30 (H) 12/08/2023    CO2 28 10/11/2023    ANIONGAP 3 (L) 12/08/2023    ANIONGAP 5 (L) 10/11/2023    BUN 17 12/08/2023    BUN 12 10/11/2023    CREATININE 1.1 12/08/2023    CREATININE 1.1 10/11/2023    GLU 72 12/08/2023     10/11/2023    CALCIUM 9.8 12/08/2023    CALCIUM 9.5 10/11/2023    ALBUMIN 4.2 12/08/2023    ALBUMIN 4.2 10/11/2023    PROT 6.9 12/08/2023    PROT 6.9 10/11/2023    ALKPHOS 96 12/08/2023    ALKPHOS 93 10/11/2023    ALT 14 12/08/2023    ALT 16 10/11/2023    AST 17 12/08/2023    AST 18 10/11/2023    BILITOT 0.5 12/08/2023    BILITOT 0.7 10/11/2023        CARDIAC PROFILE - LAST 2  Lab Results   Component Value Date    BNP 16 06/11/2020    BNP 8.00 11/12/2018     04/24/2018    TROPONINI <0.030 04/24/2018        COAGULATION - LAST 2  No results found for: "LABPT", "INR", "APTT"    ENDOCRINE & PSA - LAST 2  Lab Results   Component Value Date    HGBA1C 5.6 10/11/2023    TSH 2.541 12/29/2023    TSH 3.901 10/11/2023    PSA 1.2 06/12/2023        ECHOCARDIOGRAM RESULTS  Results for orders placed in visit on 07/31/20    Stress Echo Which stress agent will be used? Treadmill Exercise; Color Flow Doppler? No    Interpretation Summary  · There were no arrhythmias during stress.  · The test was stopped because the patient experienced fatigue.  · Mild concentric left " ventricular hypertrophy.  · Normal left ventricular systolic function. The estimated ejection fraction is 65%.  · Mild right ventricular enlargement.  · Normal right ventricular systolic function.  · Mild tricuspid regurgitation.  · The stress echo portion of this study is negative for myocardial ischemia. some flattening of septum noted after exercise suggestive of increased RV pressure.  · The patient's exercise capacity was mildly impaired.  · The ECG portion of this study is negative for myocardial ischemia.      CURRENT/PREVIOUS VISIT EKG  Results for orders placed or performed during the hospital encounter of 09/01/23   EKG 12-lead    Collection Time: 09/01/23  9:55 AM    Narrative    Test Reason : Z01.818,    Vent. Rate : 064 BPM     Atrial Rate : 064 BPM     P-R Int : 156 ms          QRS Dur : 092 ms      QT Int : 424 ms       P-R-T Axes : 076 027 063 degrees     QTc Int : 437 ms    Normal sinus rhythm  Normal ECG  When compared with ECG of 27-JUN-2017 11:57,  No significant change was found  Confirmed by Carlos Manuel NOLAN, Alexis MORRISON ([id number]) on 9/16/2023 5:28:33 PM    Referred By:  AILYN           Confirmed By:Alexis Horowitz MD     No valid procedures specified.   No results found for this or any previous visit.    No valid procedures specified.        Assessment:       1. Elevated coronary artery calcium score    2. At high risk for cardiovascular disease    3. Pedal edema    4. Nonspecific abnormal electrocardiogram (ECG) (EKG)           Plan:       Elevated coronary artery calcium score  -     Ambulatory referral/consult to Cardiology  -     IN OFFICE EKG 12-LEAD (to [facility])  -     Cancel: Nuclear Stress - Cardiology Interpreted; Future  -     Nuclear Stress - Cardiology Interpreted; Future  -     Echo; Future    At high risk for cardiovascular disease  -     rosuvastatin (CRESTOR) 5 MG tablet; Take 1 tablet (5 mg total) by mouth once daily.  Dispense: 90 tablet; Refill: 3  -     Cancel: Nuclear Stress -  Cardiology Interpreted; Future  -     Nuclear Stress - Cardiology Interpreted; Future    Pedal edema  Comments:  likely venostasis, recommend compression stockings    Nonspecific abnormal electrocardiogram (ECG) (EKG)  -     Nuclear Stress - Cardiology Interpreted; Future  -     Echo; Future    Patient instructed to maintain a blood pressure log and to bring it at next visit as systolic was mildly high at 140 today.  Rest as above.  Advised him moderate physical exertion 5 days a week and Mediterranean diet.  Primary care's note mentioned statin induced myositis but patient reports tolerating the statin fine.  No lab evidence of elevated CK found in the past.  Will try statin, patient instructed to stop it if he has myalgias or cramps.    Follow up in about 6 weeks (around 7/31/2024) for f/u stress test, echo.          MD [facility] [place]  [place]

## 2024-06-22 LAB
OHS QRS DURATION: 102 MS
OHS QTC CALCULATION: 424 MS

## 2024-06-23 ENCOUNTER — PATIENT MESSAGE (OUTPATIENT)
Dept: FAMILY MEDICINE | Facility: CLINIC | Age: 74
End: 2024-06-23
Payer: MEDICARE

## 2024-06-24 RX ORDER — DONEPEZIL HYDROCHLORIDE 10 MG/1
10 TABLET, FILM COATED ORAL DAILY
Qty: 90 TABLET | Refills: 0 | Status: SHIPPED | OUTPATIENT
Start: 2024-06-24

## 2024-06-27 ENCOUNTER — PATIENT MESSAGE (OUTPATIENT)
Dept: ADMINISTRATIVE | Facility: HOSPITAL | Age: 74
End: 2024-06-27
Payer: MEDICARE

## 2024-06-27 ENCOUNTER — PATIENT OUTREACH (OUTPATIENT)
Dept: ADMINISTRATIVE | Facility: HOSPITAL | Age: 74
End: 2024-06-27
Payer: MEDICARE

## 2024-06-27 NOTE — PROGRESS NOTES
Uncontrolled BP REPORT  BP Readings from Last 3 Encounters:   06/19/24 (!) 140/75   04/01/24 128/72   12/29/23 132/62       Non-compliant report chart audits for HYPERTENSION MANAGEMENT   NEED REMOTE HOME BP READING DOCUMENTED   OR  BP FOLLOW UP WITH NURSE VISIT OR CARE TEAM MEMBER

## 2024-07-09 VITALS — SYSTOLIC BLOOD PRESSURE: 126 MMHG | DIASTOLIC BLOOD PRESSURE: 89 MMHG

## 2024-07-09 NOTE — TELEPHONE ENCOUNTER
Aaron Ross 74 y.o. male     Review of patient's allergies indicates:   Allergen Reactions    Adhesive tape-silicones Rash     Allergy to adhesive on gauze - paper & regular tape ok             Patient was instructed to you sit upright with both feet flat on the floor for five minutes without any distractions/interruptions (no television, no talking, etc.). After five minutes of sitting, measure their blood pressure. If their blood pressure was over 140/90.  They were asked to wait 15 minutes and retake in the other arm.       REMOTE HOME BLOOD PRESSURE READING DOCUMENTED WITH TWO MOST RECENT IN CLINIC BLOOD PRESSURE READINGS.      Pulse55    BP Readings from Last 3 Encounters:   07/09/24 126/89   06/19/24 (!) 140/75   04/01/24 128/72

## 2024-07-10 ENCOUNTER — TELEPHONE (OUTPATIENT)
Dept: CARDIOLOGY | Facility: HOSPITAL | Age: 74
End: 2024-07-10

## 2024-07-10 NOTE — TELEPHONE ENCOUNTER
Patient advised, [facility]   Will need to register on the first floor at the main entrance.   Patient advised that arrival time is 6:20am.  Patient advised that he may be here about 3.5-4 hours, and may want to bring something to occupy their time, as there will be periods of waiting.    Patient advised, may take his medications prior to testing if you need to.  Advised if he needs to eat to take his medications, please keep it light, like toast and juice.    Patient advised to avoid all caffeine 12 hours prior to testing.  This includes decaf tea and coffee.    Will provide peanut butter crackers for a snack after stress test.  If patient would prefer something else, please bring a snack from home.    Wear comfortable clothing.   No lotions, oils, or powders to the upper chest area. May wear deodorant.    No metal jewelry, buttons, or zippers to the upper body.  Patient verbalizes understanding of instructions.

## 2024-07-11 ENCOUNTER — HOSPITAL ENCOUNTER (OUTPATIENT)
Dept: CARDIOLOGY | Facility: HOSPITAL | Age: 74
Discharge: HOME | End: 2024-07-11
Attending: INTERNAL MEDICINE
Payer: MEDICARE

## 2024-07-11 ENCOUNTER — HOSPITAL ENCOUNTER (OUTPATIENT)
Dept: RADIOLOGY | Facility: HOSPITAL | Age: 74
Discharge: HOME | End: 2024-07-11
Attending: INTERNAL MEDICINE
Payer: MEDICARE

## 2024-07-11 VITALS — WEIGHT: 267 LBS | HEIGHT: 72 IN | BODY MASS INDEX: 36.16 KG/M2

## 2024-07-11 DIAGNOSIS — R93.1 ELEVATED CORONARY ARTERY CALCIUM SCORE: ICD-10-CM

## 2024-07-11 DIAGNOSIS — R94.31 NONSPECIFIC ABNORMAL ELECTROCARDIOGRAM (ECG) (EKG): ICD-10-CM

## 2024-07-11 DIAGNOSIS — Z91.89 AT HIGH RISK FOR CARDIOVASCULAR DISEASE: ICD-10-CM

## 2024-07-11 LAB
AORTIC ROOT ANNULUS: 3.7 CM
AORTIC VALVE CUSP SEPERATION: 1.4 CM
AV INDEX (PROSTH): 0.74
AV MEAN GRADIENT: 4 MMHG
AV PEAK GRADIENT: 8 MMHG
AV VALVE AREA BY VELOCITY RATIO: 2.36 CM²
AV VALVE AREA: 2.31 CM²
AV VELOCITY RATIO: 0.75
BSA FOR ECHO PROCEDURE: 2.48 M2
CV ECHO LV RWT: 0.4 CM
CV STRESS BASE HR: 51 BPM
DIASTOLIC BLOOD PRESSURE: 86 MMHG
DOP CALC AO PEAK VEL: 1.4 M/S
DOP CALC AO VTI: 34.6 CM
DOP CALC LVOT AREA: 3.1 CM2
DOP CALC LVOT DIAMETER: 2 CM
DOP CALC LVOT PEAK VEL: 1.05 M/S
DOP CALC LVOT STROKE VOLUME: 80.07 CM3
DOP CALCLVOT PEAK VEL VTI: 25.5 CM
E WAVE DECELERATION TIME: 227 MSEC
E/A RATIO: 1.17
E/E' RATIO: 7.5 M/S
ECHO LV POSTERIOR WALL: 1.04 CM (ref 0.6–1.1)
EJECTION FRACTION- HIGH: 65 %
END DIASTOLIC INDEX-HIGH: 153 ML/M2
END DIASTOLIC INDEX-LOW: 93 ML/M2
END SYSTOLIC INDEX-HIGH: 71 ML/M2
END SYSTOLIC INDEX-LOW: 31 ML/M2
FRACTIONAL SHORTENING: 34 % (ref 28–44)
INTERVENTRICULAR SEPTUM: 0.83 CM (ref 0.6–1.1)
IVRT: 95 MSEC
LEFT ATRIUM SIZE: 3.8 CM
LEFT INTERNAL DIMENSION IN SYSTOLE: 3.42 CM (ref 2.1–4)
LEFT VENTRICLE DIASTOLIC VOLUME INDEX: 53.11 ML/M2
LEFT VENTRICLE DIASTOLIC VOLUME: 128 ML
LEFT VENTRICLE MASS INDEX: 73 G/M2
LEFT VENTRICLE SYSTOLIC VOLUME INDEX: 20 ML/M2
LEFT VENTRICLE SYSTOLIC VOLUME: 48.1 ML
LEFT VENTRICULAR INTERNAL DIMENSION IN DIASTOLE: 5.18 CM (ref 3.5–6)
LEFT VENTRICULAR MASS: 176.49 G
LV LATERAL E/E' RATIO: 5.63 M/S
LV SEPTAL E/E' RATIO: 11.25 M/S
LVED V (TEICH): 128 ML
LVES V (TEICH): 48.1 ML
LVOT MG: 2 MMHG
LVOT MV: 0.65 CM/S
MV PEAK A VEL: 0.77 M/S
MV PEAK E VEL: 0.9 M/S
MV STENOSIS PRESSURE HALF TIME: 69 MS
MV VALVE AREA P 1/2 METHOD: 3.19 CM2
NUC REST DIASTOLIC VOLUME INDEX: 90
NUC REST EJECTION FRACTION: 67
NUC REST SYSTOLIC VOLUME INDEX: 30
NUC STRESS DIASTOLIC VOLUME INDEX: 105
NUC STRESS EJECTION FRACTION: 66 %
NUC STRESS SYSTOLIC VOLUME INDEX: 36
OHS CV CPX 1 MINUTE RECOVERY HEART RATE: 125 BPM
OHS CV CPX 85 PERCENT MAX PREDICTED HEART RATE MALE: 124
OHS CV CPX ESTIMATED METS: 7
OHS CV CPX MAX PREDICTED HEART RATE: 146
OHS CV CPX PATIENT IS FEMALE: 0
OHS CV CPX PATIENT IS MALE: 1
OHS CV CPX PEAK DIASTOLIC BLOOD PRESSURE: 84 MMHG
OHS CV CPX PEAK HEAR RATE: 137 BPM
OHS CV CPX PEAK RATE PRESSURE PRODUCT: NORMAL
OHS CV CPX PEAK SYSTOLIC BLOOD PRESSURE: 194 MMHG
OHS CV CPX PERCENT MAX PREDICTED HEART RATE ACHIEVED: 94
OHS CV CPX RATE PRESSURE PRODUCT PRESENTING: 6630
OHS CV RV/LV RATIO: 0.53 CM
PISA TR MAX VEL: 2.12 M/S
RA PRESSURE ESTIMATED: 3 MMHG
RETIRED EF AND QEF - SEE NOTES: 53 %
RIGHT VENTRICULAR END-DIASTOLIC DIMENSION: 2.73 CM
RV TB RVSP: 5 MMHG
STRESS ECHO POST EXERCISE DUR MIN: 5 MINUTES
STRESS ECHO POST EXERCISE DUR SEC: 21 SECONDS
STRESS ST DEPRESSION: 1 MM
SYSTOLIC BLOOD PRESSURE: 130 MMHG
TDI LATERAL: 0.16 M/S
TDI SEPTAL: 0.08 M/S
TDI: 0.12 M/S
TR MAX PG: 18 MMHG
TV REST PULMONARY ARTERY PRESSURE: 21 MMHG
Z-SCORE OF LEFT VENTRICULAR DIMENSION IN END DIASTOLE: -7.7
Z-SCORE OF LEFT VENTRICULAR DIMENSION IN END SYSTOLE: -5.3

## 2024-07-11 PROCEDURE — A9502 TC99M TETROFOSMIN: HCPCS | Performed by: INTERNAL MEDICINE

## 2024-07-11 PROCEDURE — 93306 TTE W/DOPPLER COMPLETE: CPT

## 2024-07-11 PROCEDURE — 78452 HT MUSCLE IMAGE SPECT MULT: CPT | Mod: 26,,, | Performed by: INTERNAL MEDICINE

## 2024-07-11 PROCEDURE — 93016 CV STRESS TEST SUPVJ ONLY: CPT | Mod: ,,, | Performed by: NURSE PRACTITIONER

## 2024-07-11 PROCEDURE — 93018 CV STRESS TEST I&R ONLY: CPT | Mod: ,,, | Performed by: INTERNAL MEDICINE

## 2024-07-11 PROCEDURE — 93017 CV STRESS TEST TRACING ONLY: CPT

## 2024-07-11 PROCEDURE — 93306 TTE W/DOPPLER COMPLETE: CPT | Mod: 26,,, | Performed by: INTERNAL MEDICINE

## 2024-07-11 RX ADMIN — TETROFOSMIN 12.2 MILLICURIE: 1.38 INJECTION, POWDER, LYOPHILIZED, FOR SOLUTION INTRAVENOUS at 06:07

## 2024-07-11 RX ADMIN — TETROFOSMIN 27.1 MILLICURIE: 1.38 INJECTION, POWDER, LYOPHILIZED, FOR SOLUTION INTRAVENOUS at 08:07

## 2024-07-23 ENCOUNTER — OFFICE VISIT (OUTPATIENT)
Dept: CARDIOLOGY | Facility: CLINIC | Age: 74
End: 2024-07-23
Payer: MEDICARE

## 2024-07-23 VITALS
WEIGHT: 268.06 LBS | HEIGHT: 72 IN | OXYGEN SATURATION: 96 % | HEART RATE: 64 BPM | DIASTOLIC BLOOD PRESSURE: 78 MMHG | SYSTOLIC BLOOD PRESSURE: 152 MMHG | BODY MASS INDEX: 36.31 KG/M2

## 2024-07-23 DIAGNOSIS — E78.5 DYSLIPIDEMIA: Primary | ICD-10-CM

## 2024-07-23 DIAGNOSIS — R03.0 ELEVATED BLOOD PRESSURE READING: ICD-10-CM

## 2024-07-23 DIAGNOSIS — R60.0 PEDAL EDEMA: ICD-10-CM

## 2024-07-23 PROCEDURE — 99213 OFFICE O/P EST LOW 20 MIN: CPT | Mod: PBBFAC,PN | Performed by: INTERNAL MEDICINE

## 2024-07-23 PROCEDURE — 99999 PR PBB SHADOW E&M-EST. PATIENT-LVL III: CPT | Mod: PBBFAC,,, | Performed by: INTERNAL MEDICINE

## 2024-07-23 PROCEDURE — 99214 OFFICE O/P EST MOD 30 MIN: CPT | Mod: S$PBB,,, | Performed by: INTERNAL MEDICINE

## 2024-07-23 NOTE — PROGRESS NOTES
[facility]    Subjective:     Patient ID:  Aaron Ross is a 74 y.o. male patient here for evaluation Results (Stress and Echo)      HPI: 74 year old male follow-up.  Normal stress test and normal echo.  Reports occasionally he suddenly feels a heart/and checks his blood pressure and this elevated and 160s, has a EKG monitoring device at home which shows normal rhythm according to him, heart rate unknown.  Otherwise continues to be active without exertional chest pain or shortness of breath.  Home blood pressure recordings were done and scanned under media.  Blood pressure mostly in 120 systolics and diastolic in 60s and 70s.    Review of Systems   All other systems reviewed and are negative.       Past Medical History:   Diagnosis Date    Arthritis     BMI 36.0-36.9,adult 06/25/2018    Hypertension     Sleep apnea     USES CPAP    Thyroid disease     Varicose veins of both lower extremities     Wears glasses        Past Surgical History:   Procedure Laterality Date    BACK SURGERY      PAPAYA JUICE INJECTION/Chemopapain 40yrs ago.    CYSTOSCOPY N/A 07/25/2023    Procedure: CYSTOSCOPY;  Surgeon: Robe Reyes MD;  Location: [facility] OR;  Service: Urology;  Laterality: N/A;    CYSTOSCOPY WITH INSERTION OF MINIMALLY INVASIVE IMPLANT TO ENLARGE PROSTATIC URETHRA N/A 9/14/2023    Procedure: CYSTOSCOPY, WITH INSERTION OF UROLIFT IMPLANT;  Surgeon: Robe Reyes MD;  Location: [facility] OR;  Service: Urology;  Laterality: N/A;    FINGER MASS EXCISION Right 01/19/2023    Procedure: Right small finger mucoid cyst excision;  Surgeon: Anthony Edwards MD;  Location: [facility] OR;  Service: Orthopedics;  Laterality: Right;    JOINT REPLACEMENT Right 10/01/2012    JOINT REPLACEMENT Left     KNEE    KNEE SURGERY  2009    THYROID SURGERY  2013    PARTIAL    TRANSRECTAL ULTRASOUND EXAMINATION N/A 07/25/2023    Procedure: ULTRASOUND, RECTAL APPROACH;   Surgeon: Robe Reyes MD;  Location: [facility] OR;  Service: Urology;  Laterality: N/A;    UVULA  2000    REMOVED FOR SLEEP APNEA       No family history on file.    Social History     Socioeconomic History    Marital status:    Occupational History    Occupation: RETIRED   Tobacco Use    Smoking status: Never    Smokeless tobacco: Never   Substance and Sexual Activity    Alcohol use: Yes     Comment: RARELY    Drug use: No    Sexual activity: Yes     Partners: Female     Social Determinants of Health     Financial Resource Strain: Low Risk  (6/19/2024)    Overall Financial Resource Strain (CARDIA)     Difficulty of Paying Living Expenses: Not hard at all   Food Insecurity: No Food Insecurity (6/19/2024)    Hunger Vital Sign     Worried About Running Out of Food in the Last Year: Never true     Ran Out of Food in the Last Year: Never true   Physical Activity: Insufficiently Active (6/19/2024)    Exercise Vital Sign     Days of Exercise per Week: 1 day     Minutes of Exercise per Session: 30 min   Stress: No Stress Concern Present (6/19/2024)    [employer] - Occupational Stress Questionnaire     Feeling of Stress : Only a little   Housing Stability: Unknown (6/19/2024)    Housing Stability Vital Sign     Unable to Pay for Housing in the Last Year: No       Current Outpatient Medications   Medication Sig Dispense Refill    aspirin (ECOTRIN) 81 MG EC tablet Take 81 mg by mouth once daily.      donepeziL (ARICEPT) 10 MG tablet Take 1 tablet (10 mg total) by mouth once daily. 90 tablet 0    hydrocortisone 2.5 % cream SMARTSIG:Sparingly Rectally Twice Daily      ketoconazole (NIZORAL) 2 % shampoo Apply 1 Applicatorful topically every other day. 480 mL 3    rosuvastatin (CRESTOR) 5 MG tablet Take 1 tablet (5 mg total) by mouth once daily. 90 tablet 3     No current facility-administered medications for this visit.       Review of patient's allergies indicates:   Allergen Reactions     Adhesive tape-silicones Rash     Allergy to adhesive on gauze - paper & regular tape ok         Objective:        Vitals:    07/23/24 1313   BP: (!) 152/78   Pulse: 64       Physical Exam  Vitals reviewed.   Constitutional:       Appearance: Normal appearance.   HENT:      Mouth/Throat:      Mouth: Mucous membranes are moist.   Eyes:      Pupils: Pupils are equal, round, and reactive to light.   Cardiovascular:      Rate and Rhythm: Normal rate and regular rhythm.      Pulses: Normal pulses.      Heart sounds: Normal heart sounds. No murmur heard.     No gallop.   Pulmonary:      Effort: Pulmonary effort is normal.      Breath sounds: Normal breath sounds.   Abdominal:      General: Bowel sounds are normal.      Palpations: Abdomen is soft.   Skin:     General: Skin is warm and dry.   Neurological:      General: No focal deficit present.      Mental Status: He is alert and oriented to person, place, and time.         LIPIDS - LAST 2   Lab Results   Component Value Date    CHOL 147 10/11/2023    CHOL 105 (L) 06/12/2023    HDL 37 (L) 10/11/2023    HDL 34 (L) 06/12/2023    LDLCALC 86.8 10/11/2023    LDLCALC 54.8 (L) 06/12/2023    TRIG 116 10/11/2023    TRIG 81 06/12/2023    CHOLHDL 25.2 10/11/2023    CHOLHDL 32.4 06/12/2023       CBC - LAST 2  Lab Results   Component Value Date    WBC 7.28 12/08/2023    WBC 6.67 10/11/2023    RBC 4.51 (L) 12/08/2023    RBC 4.50 (L) 10/11/2023    HGB 14.2 12/08/2023    HGB 14.1 10/11/2023    HCT 43.2 12/08/2023    HCT 41.5 10/11/2023    MCV 96 12/08/2023    MCV 92 10/11/2023    MCH 31.5 (H) 12/08/2023    MCH 31.3 (H) 10/11/2023    MCHC 32.9 12/08/2023    MCHC 34.0 10/11/2023    RDW 12.9 12/08/2023    RDW 13.3 10/11/2023     12/08/2023     10/11/2023    MPV 10.0 12/08/2023    MPV 9.7 10/11/2023    GRAN 3.9 12/08/2023    GRAN 52.9 12/08/2023    LYMPH 2.3 12/08/2023    LYMPH 31.2 12/08/2023    MONO 1.0 12/08/2023    MONO 13.3 12/08/2023    BASO 0.03 12/08/2023    BASO 0.04  "10/11/2023    NRBC 0 12/08/2023    NRBC 0 10/11/2023       CHEMISTRY & LIVER FUNCTION - LAST 2  Lab Results   Component Value Date     12/08/2023     10/11/2023    K 4.4 12/08/2023    K 4.2 10/11/2023     12/08/2023     10/11/2023    CO2 30 (H) 12/08/2023    CO2 28 10/11/2023    ANIONGAP 3 (L) 12/08/2023    ANIONGAP 5 (L) 10/11/2023    BUN 17 12/08/2023    BUN 12 10/11/2023    CREATININE 1.1 12/08/2023    CREATININE 1.1 10/11/2023    GLU 72 12/08/2023     10/11/2023    CALCIUM 9.8 12/08/2023    CALCIUM 9.5 10/11/2023    ALBUMIN 4.2 12/08/2023    ALBUMIN 4.2 10/11/2023    PROT 6.9 12/08/2023    PROT 6.9 10/11/2023    ALKPHOS 96 12/08/2023    ALKPHOS 93 10/11/2023    ALT 14 12/08/2023    ALT 16 10/11/2023    AST 17 12/08/2023    AST 18 10/11/2023    BILITOT 0.5 12/08/2023    BILITOT 0.7 10/11/2023        CARDIAC PROFILE - LAST 2  Lab Results   Component Value Date    BNP 16 06/11/2020    BNP 8.00 11/12/2018     04/24/2018    TROPONINI <0.030 04/24/2018        COAGULATION - LAST 2  No results found for: "LABPT", "INR", "APTT"    ENDOCRINE & PSA - LAST 2  Lab Results   Component Value Date    HGBA1C 5.6 10/11/2023    TSH 2.541 12/29/2023    TSH 3.901 10/11/2023    PSA 1.2 06/12/2023        ECHOCARDIOGRAM RESULTS  Results for orders placed during the hospital encounter of 07/11/24    Echo    Interpretation Summary    Left Ventricle: The left ventricle is normal in size. Normal wall thickness. There is normal systolic function with a visually estimated ejection fraction of 55 - 60%.    Right Ventricle: Right ventricle was not well visualized due to poor acoustic window. Systolic function is normal.    Aortic Valve: The aortic valve is a trileaflet valve. There is mild aortic valve sclerosis.    Mitral Valve: There is no stenosis.    Tricuspid Valve: There is mild regurgitation.    IVC/SVC: Normal venous pressure at 3 mmHg.      CURRENT/PREVIOUS VISIT EKG  Results for orders placed or " performed in visit on 06/19/24   IN OFFICE EKG 12-LEAD (to [employer])    Collection Time: 06/19/24  1:19 PM   Result Value Ref Range    QRS Duration 102 ms    [facility] QTC Calculation 424 ms    Narrative    Test Reason : I25.10,    Vent. Rate : 056 BPM     Atrial Rate : 056 BPM     P-R Int : 150 ms          QRS Dur : 102 ms      QT Int : 440 ms       P-R-T Axes : 057 -31 028 degrees     QTc Int : 424 ms    Sinus bradycardia  Left axis deviation  Moderate voltage criteria for LVH, may be normal variant  Abnormal ECG  When compared with ECG of 01-SEP-2023 09:55,  No significant change was found    Referred By: NATALIE LOPEZ           Confirmed By:      No valid procedures specified.   Results for orders placed during the hospital encounter of 07/11/24    Nuclear Stress - Cardiology Interpreted    Interpretation Summary    Normal myocardial perfusion scan. There is no evidence of myocardial ischemia or infarction.    The gated perfusion images showed an ejection fraction of 67% at rest. The gated perfusion images showed an ejection fraction of 66% post stress. Normal ejection fraction is greater than 53%.    The ECG portion of the study is abnormal but not diagnostic.    The patient reported no chest pain during the stress test.    The patient exercised for 5 minutes 21 seconds on a Joselito protocol, corresponding to a functional capacity of 7METS, achieving a peak heart rate of 137 bpm, which is 94% of the age predicted maximum heart rate.    No valid procedures specified.        Assessment:       1. Dyslipidemia    2. Pedal edema    3. Elevated blood pressure reading           Plan:       Dyslipidemia  Comments:  c/w crestor 5 mg, target LDL 70    Pedal edema  Comments:  bnp normal in past, possible venostasis  Orders:  -     Ambulatory referral/consult to Vascular Surgery; Future; Expected date: 07/30/2024    Elevated blood pressure reading  Comments:  Home BP readings are better. discussed with patient, will defer rx for 6  months with regular recordings, if still elevated, recomment rx it.      Follow up in about 1 year (around 7/23/2025) for Dyslipidemia, HTN.          MD [facility] [place]  [place]

## 2024-09-18 DIAGNOSIS — M79.89 LEG SWELLING: Primary | ICD-10-CM

## 2024-09-18 DIAGNOSIS — I87.2 VENOUS INSUFFICIENCY (CHRONIC) (PERIPHERAL): ICD-10-CM

## 2024-10-08 RX ORDER — DONEPEZIL HYDROCHLORIDE 10 MG/1
10 TABLET, FILM COATED ORAL DAILY
Qty: 90 TABLET | Refills: 0 | Status: SHIPPED | OUTPATIENT
Start: 2024-10-08

## 2024-10-08 NOTE — TELEPHONE ENCOUNTER
Refill Routing Note   Medication(s) are not appropriate for processing by [facility] for the following reason(s):        Outside of protocol    [facility] action(s):  Route               Appointments  past 12m or future 3m with PCP    Date Provider   Last Visit   4/1/2024 Wilfrido Lemon III, MD   Next Visit   Visit date not found Wilfrido Lemon III, MD   ED visits in past 90 days: 0        Note composed:9:18 PM 10/07/2024

## 2024-10-15 ENCOUNTER — TELEPHONE (OUTPATIENT)
Dept: RADIOLOGY | Facility: HOSPITAL | Age: 74
End: 2024-10-15

## 2024-10-16 ENCOUNTER — HOSPITAL ENCOUNTER (OUTPATIENT)
Dept: RADIOLOGY | Facility: HOSPITAL | Age: 74
Discharge: HOME | End: 2024-10-16
Attending: STUDENT IN AN ORGANIZED HEALTH CARE EDUCATION/TRAINING PROGRAM
Payer: MEDICARE

## 2024-10-16 DIAGNOSIS — R73.03 PREDIABETES: ICD-10-CM

## 2024-10-16 DIAGNOSIS — M79.89 LEG SWELLING: ICD-10-CM

## 2024-10-16 DIAGNOSIS — I87.2 VENOUS INSUFFICIENCY (CHRONIC) (PERIPHERAL): ICD-10-CM

## 2024-10-16 PROCEDURE — 93970 EXTREMITY STUDY: CPT | Mod: TC

## 2024-10-16 PROCEDURE — 93970 EXTREMITY STUDY: CPT | Mod: 26,,, | Performed by: RADIOLOGY

## 2024-10-21 ENCOUNTER — PATIENT MESSAGE (OUTPATIENT)
Dept: ADMINISTRATIVE | Facility: HOSPITAL | Age: 74
End: 2024-10-21
Payer: MEDICARE

## 2024-10-29 ENCOUNTER — INITIAL CONSULT (OUTPATIENT)
Dept: VASCULAR SURGERY | Facility: CLINIC | Age: 74
End: 2024-10-29
Payer: MEDICARE

## 2024-10-29 VITALS
WEIGHT: 268.06 LBS | HEIGHT: 72 IN | DIASTOLIC BLOOD PRESSURE: 77 MMHG | HEART RATE: 54 BPM | BODY MASS INDEX: 36.31 KG/M2 | SYSTOLIC BLOOD PRESSURE: 140 MMHG

## 2024-10-29 DIAGNOSIS — R60.0 PEDAL EDEMA: ICD-10-CM

## 2024-10-29 PROCEDURE — 99204 OFFICE O/P NEW MOD 45 MIN: CPT | Mod: S$PBB,,, | Performed by: STUDENT IN AN ORGANIZED HEALTH CARE EDUCATION/TRAINING PROGRAM

## 2024-10-29 PROCEDURE — 99999 PR PBB SHADOW E&M-EST. PATIENT-LVL III: CPT | Mod: PBBFAC,,, | Performed by: STUDENT IN AN ORGANIZED HEALTH CARE EDUCATION/TRAINING PROGRAM

## 2024-10-29 PROCEDURE — 99213 OFFICE O/P EST LOW 20 MIN: CPT | Mod: PBBFAC,PN | Performed by: STUDENT IN AN ORGANIZED HEALTH CARE EDUCATION/TRAINING PROGRAM

## 2024-10-29 NOTE — PROGRESS NOTES
[facility] - Cardiovascular Surg  [facility]  History & Physical    SUBJECTIVE:     Chief Complaint:   Chief Complaint   Patient presents with    Leg Swelling         History of Present Illness:  Aaron Ross is a 74 y.o. male presents with a history of HTN, sleep apnea and varicose veins in bilateral lower extremities who presents with a history of bilateral lower extremity swelling.  He reports that he has been having swelling now for the past several months.  He denies any pain associated with the swelling.  He denies any history of DVTs        Review of patient's allergies indicates:   Allergen Reactions    Adhesive tape-silicones Rash     Allergy to adhesive on gauze - paper & regular tape ok       Past Medical History:   Diagnosis Date    Arthritis     BMI 36.0-36.9,adult 06/25/2018    Hypertension     Sleep apnea     USES CPAP    Thyroid disease     Varicose veins of both lower extremities     Wears glasses      Past Surgical History:   Procedure Laterality Date    BACK SURGERY      PAPAYA JUICE INJECTION/Chemopapain 40yrs ago.    CYSTOSCOPY N/A 07/25/2023    Procedure: CYSTOSCOPY;  Surgeon: Robe Reyes MD;  Location: [facility] OR;  Service: Urology;  Laterality: N/A;    CYSTOSCOPY WITH INSERTION OF MINIMALLY INVASIVE IMPLANT TO ENLARGE PROSTATIC URETHRA N/A 9/14/2023    Procedure: CYSTOSCOPY, WITH INSERTION OF UROLIFT IMPLANT;  Surgeon: Robe Reyes MD;  Location: [facility] OR;  Service: Urology;  Laterality: N/A;    FINGER MASS EXCISION Right 01/19/2023    Procedure: Right small finger mucoid cyst excision;  Surgeon: Anthony Edwards MD;  Location: [facility] OR;  Service: Orthopedics;  Laterality: Right;    JOINT REPLACEMENT Right 10/01/2012    JOINT REPLACEMENT Left     KNEE    KNEE SURGERY  2009    THYROID SURGERY  2013    PARTIAL    TRANSRECTAL ULTRASOUND EXAMINATION N/A 07/25/2023    Procedure: ULTRASOUND, RECTAL APPROACH;  Surgeon: Robe Reyes MD;  Location: [facility]  OR;  Service: Urology;  Laterality: N/A;    UVULA  2000    REMOVED FOR SLEEP APNEA     No family history on file.  Social History     Tobacco Use    Smoking status: Never    Smokeless tobacco: Never   Substance Use Topics    Alcohol use: Yes     Comment: RARELY    Drug use: No        Review of Systems:  Review of Systems   All other systems reviewed and are negative.      OBJECTIVE:     Vital Signs (Most Recent):  Pulse: (!) 54 (10/29/24 1141)  BP: (!) 140/77 (10/29/24 1141)    Physical Exam:  Physical Exam   Constitutional: He is oriented to person, place, and time.  Non-toxic appearance. No distress.   HENT:   Head: Normocephalic and atraumatic.   Cardiovascular: Normal rate and normal pulses. Pulmonary:      Effort: Pulmonary effort is normal. No respiratory distress.      Breath sounds: No wheezing.     Abdominal: Soft.   Musculoskeletal:      Right lower leg: Edema present.      Left lower leg: Edema present.   Neurological: He is alert and oriented to person, place, and time.   Skin: Skin is warm and dry. Capillary refill takes less than 2 seconds.   Psychiatric: His behavior is normal. Mood normal.   Vitals reviewed.      Laboratory:  Lab Results   Component Value Date    WBC 7.28 12/08/2023    HGB 14.2 12/08/2023    HCT 43.2 12/08/2023     12/08/2023    CHOL 147 10/11/2023    TRIG 116 10/11/2023    HDL 37 (L) 10/11/2023    ALT 14 12/08/2023    AST 17 12/08/2023     12/08/2023    K 4.4 12/08/2023     12/08/2023    CREATININE 1.1 12/08/2023    BUN 17 12/08/2023    CO2 30 (H) 12/08/2023    TSH 2.541 12/29/2023    PSA 1.2 06/12/2023    HGBA1C 5.6 10/11/2023         Diagnostic Results:  EXAMINATION:  US LOWER EXTREMITY VEINS BILATERAL INSUFFICIENCY     CLINICAL HISTORY:  Other specified soft tissue disorders     TECHNIQUE:  Bilateral lower extremity venous Doppler exam including Valsalva or standing maneuvers for evaluation of venous insufficiency of the superficial systems bilaterally.  Reflux  times greater than 500 ms were considered indicative of superficial system reflux.     COMPARISON:  None     FINDINGS:  On both sides valvular insufficiency and venous reflux is not demonstrated in the greater saphenous or small saphenous veins.     Impression:     No Doppler ultrasound evidence of valvular insufficiency and venous reflux.         ASSESSMENT/PLAN:     Based on the patient's presentation, history, and exam findings I do have a strong suspicion for venous insufficiency (CEAP 3 classification).  I was able to explain the etiology of venous insufficiency as well as a management the patient extensive detail.  We discussed that the 1st line of management for venous insufficiency is a conservative trial.  This includes moderate compression stockings graded at 20-30 mm Hg, weight loss, exercise, periodic elevation of the legs during the day, and over-the-counter anti-inflammatory medication for pain as needed.  We will start this conservative trial today and will be ongoing for three-month under my personal supervision    Despite my suspicion for venous insufficiency it does appear that his venous reflux ultrasound is negative for reflux in his bilateral superficial venous system.  With a negative insufficiency ultrasound study I can not make the diagnosis of venous insufficiency.  Regardless he denies any pain associated with the swelling.  My recommendation at this time is to just wear compression stockings    Plan:  knee-high moderate compression stockings graded at 20-30 mm per mercury  Follow up as needed      Pedal edema  Comments:  bnp normal in past, possible venostasis  Orders:  -     Ambulatory referral/consult to Vascular Surgery                Chela Cazares M.D.   [facility]

## 2024-11-04 ENCOUNTER — OFFICE VISIT (OUTPATIENT)
Dept: FAMILY MEDICINE | Facility: CLINIC | Age: 74
End: 2024-11-04
Payer: MEDICARE

## 2024-11-04 ENCOUNTER — HOSPITAL ENCOUNTER (OUTPATIENT)
Dept: RADIOLOGY | Facility: HOSPITAL | Age: 74
Discharge: HOME | End: 2024-11-04
Payer: MEDICARE

## 2024-11-04 VITALS
OXYGEN SATURATION: 95 % | RESPIRATION RATE: 18 BRPM | HEIGHT: 72 IN | DIASTOLIC BLOOD PRESSURE: 78 MMHG | SYSTOLIC BLOOD PRESSURE: 132 MMHG | TEMPERATURE: 99 F | WEIGHT: 271.06 LBS | HEART RATE: 57 BPM | BODY MASS INDEX: 36.71 KG/M2

## 2024-11-04 DIAGNOSIS — M25.532 LEFT WRIST PAIN: ICD-10-CM

## 2024-11-04 DIAGNOSIS — Z23 FLU VACCINE NEED: Primary | ICD-10-CM

## 2024-11-04 PROCEDURE — 99214 OFFICE O/P EST MOD 30 MIN: CPT | Mod: PBBFAC,PN

## 2024-11-04 PROCEDURE — 90653 IIV ADJUVANT VACCINE IM: CPT | Mod: PBBFAC,PN

## 2024-11-04 PROCEDURE — G0008 ADMIN INFLUENZA VIRUS VAC: HCPCS | Mod: PBBFAC,PN

## 2024-11-04 PROCEDURE — 99999PBSHW PR PBB SHADOW TECHNICAL ONLY FILED TO HB: Mod: PBBFAC,,,

## 2024-11-04 PROCEDURE — 99999 PR PBB SHADOW E&M-EST. PATIENT-LVL IV: CPT | Mod: PBBFAC,,,

## 2024-11-04 PROCEDURE — 73110 X-RAY EXAM OF WRIST: CPT | Mod: TC,LT

## 2024-11-04 PROCEDURE — 73110 X-RAY EXAM OF WRIST: CPT | Mod: 26,LT,, | Performed by: RADIOLOGY

## 2024-11-04 RX ADMIN — INFLUENZA A VIRUS A/VICTORIA/4897/2022 IVR-238 (H1N1) ANTIGEN (FORMALDEHYDE INACTIVATED), INFLUENZA A VIRUS A/THAILAND/8/2022 IVR-237 (H3N2) ANTIGEN (FORMALDEHYDE INACTIVATED), INFLUENZA B VIRUS B/AUSTRIA/1359417/2021 BVR-26 ANTIGEN (FORMALDEHYDE INACTIVATED) 0.5 ML: 15; 15; 15 INJECTION, SUSPENSION INTRAMUSCULAR at 03:11

## 2024-11-04 NOTE — PROGRESS NOTES
Subjective:       Patient ID: Aaron Ross is a 74 y.o. male.    Chief Complaint: Wrist Injury    Francisco Ross is a 74 y.o. male patient that presents to clinic with complaints of a left wrist injury that happened a week ago.  He was walking on loose rocks and they moved fromunder him and he fell onto his left wrist. He has pain in his left wrist when he moves it. Some weakness if he  something heavy.   No numbness in his fingers.  Not taking any medication.  Pain 1/10 at rest and 4-5/10 when he moves it around.  He is also wanting to discuss weight loss.  He is struggling to lose weight and is interested in medication to help him.  His BMI is 36.76.  He also wants his flu shot today.       Review of patient's allergies indicates:   Allergen Reactions    Adhesive tape-silicones Rash     Allergy to adhesive on gauze - paper & regular tape ok     Social Drivers of Health     Tobacco Use: Low Risk  (11/4/2024)    Patient History     Smoking Tobacco Use: Never     Smokeless Tobacco Use: Never     Passive Exposure: Not on file   Alcohol Use: Not At Risk (6/19/2024)    AUDIT-C     Frequency of Alcohol Consumption: Monthly or less     Average Number of Drinks: Patient does not drink     Frequency of Binge Drinking: Never   Financial Resource Strain: Low Risk  (6/19/2024)    Overall Financial Resource Strain (CARDIA)     Difficulty of Paying Living Expenses: Not hard at all   Food Insecurity: No Food Insecurity (6/19/2024)    Hunger Vital Sign     Worried About Running Out of Food in the Last Year: Never true     Ran Out of Food in the Last Year: Never true   Transportation Needs: Not on file   Physical Activity: Insufficiently Active (6/19/2024)    Exercise Vital Sign     Days of Exercise per Week: 1 day     Minutes of Exercise per Session: 30 min   Stress: No Stress Concern Present (6/19/2024)    [employer] [place] [employer] - Occupational Stress Questionnaire     Feeling of Stress : Only a  little   Housing Stability: Unknown (6/19/2024)    Housing Stability Vital Sign     Unable to Pay for Housing in the Last Year: No     Homeless in the Last Year: Not on file   Depression: Low Risk  (11/4/2024)    Depression     Last PHQ-4: Flowsheet Data: 0   Utilities: Not At Risk (6/19/2024)    [facility] Utilities     Threatened with loss of utilities: No   Health Literacy: Adequate Health Literacy (6/19/2024)     Health Literacy     Frequency of need for help with medical instructions: Never   Social Isolation: Not on file      Past Medical History:   Diagnosis Date    Arthritis     BMI 36.0-36.9,adult 06/25/2018    Hypertension     Sleep apnea     USES CPAP    Thyroid disease     Varicose veins of both lower extremities     Wears glasses       Past Surgical History:   Procedure Laterality Date    BACK SURGERY      PAPAYA JUICE INJECTION/Chemopapain 40yrs ago.    CYSTOSCOPY N/A 07/25/2023    Procedure: CYSTOSCOPY;  Surgeon: Robe Reyes MD;  Location: [facility] OR;  Service: Urology;  Laterality: N/A;    CYSTOSCOPY WITH INSERTION OF MINIMALLY INVASIVE IMPLANT TO ENLARGE PROSTATIC URETHRA N/A 9/14/2023    Procedure: CYSTOSCOPY, WITH INSERTION OF UROLIFT IMPLANT;  Surgeon: Robe Reyes MD;  Location: [facility] OR;  Service: Urology;  Laterality: N/A;    FINGER MASS EXCISION Right 01/19/2023    Procedure: Right small finger mucoid cyst excision;  Surgeon: Anthony Edwards MD;  Location: [facility] OR;  Service: Orthopedics;  Laterality: Right;    JOINT REPLACEMENT Right 10/01/2012    JOINT REPLACEMENT Left     KNEE    KNEE SURGERY  2009    THYROID SURGERY  2013    PARTIAL    TRANSRECTAL ULTRASOUND EXAMINATION N/A 07/25/2023    Procedure: ULTRASOUND, RECTAL APPROACH;  Surgeon: Robe Reyes MD;  Location: [facility] OR;  Service: Urology;  Laterality: N/A;    UVULA  2000    REMOVED FOR SLEEP APNEA      Social History     Socioeconomic History    Marital status:          Current Outpatient Medications:     " aspirin (ECOTRIN) 81 MG EC tablet, Take 81 mg by mouth once daily., Disp: , Rfl:     donepeziL (ARICEPT) 10 MG tablet, Take 1 tablet (10 mg total) by mouth once daily., Disp: 90 tablet, Rfl: 0    ketoconazole (NIZORAL) 2 % shampoo, Apply 1 Applicatorful topically every other day., Disp: 480 mL, Rfl: 3    rosuvastatin (CRESTOR) 5 MG tablet, Take 1 tablet (5 mg total) by mouth once daily., Disp: 90 tablet, Rfl: 3    hydrocortisone 2.5 % cream, SMARTSIG:Sparingly Rectally Twice Daily (Patient not taking: Reported on 11/4/2024), Disp: , Rfl:   No current facility-administered medications for this visit.    Lab Results   Component Value Date    WBC 7.28 12/08/2023    HGB 14.2 12/08/2023    HCT 43.2 12/08/2023     12/08/2023    CHOL 147 10/11/2023    TRIG 116 10/11/2023    HDL 37 (L) 10/11/2023    ALT 14 12/08/2023    AST 17 12/08/2023     12/08/2023    K 4.4 12/08/2023     12/08/2023    CREATININE 1.1 12/08/2023    BUN 17 12/08/2023    CO2 30 (H) 12/08/2023    TSH 2.541 12/29/2023    PSA 1.2 06/12/2023    HGBA1C 5.6 10/11/2023       Review of Systems   Constitutional: Negative.    Respiratory: Negative.     Cardiovascular: Negative.    Gastrointestinal: Negative.    Musculoskeletal:  Positive for arthralgias.        Left wrist pain   Neurological:  Negative for numbness.       Objective:      Physical Exam  Vitals reviewed.   Constitutional:       Appearance: Normal appearance. He is obese.   Cardiovascular:      Rate and Rhythm: Normal rate and regular rhythm.      Heart sounds: Normal heart sounds.   Pulmonary:      Effort: Pulmonary effort is normal.      Breath sounds: Normal breath sounds.   Musculoskeletal:      Left wrist: Swelling and tenderness present. Normal range of motion.   Neurological:      Mental Status: He is alert.         Assessment:       1. Flu vaccine need    2. Left wrist pain        Plan:       Aaron Song" was seen today for wrist injury.    Diagnoses and all orders for " this visit:    Flu vaccine need  -     influenza (adjuvanted) (Fluad) 45 mcg/0.5 mL IM vaccine (> or = 64 yo) 0.5 mL    Left wrist pain  -     X-Ray Wrist Complete Left; Future    Have xray of left wrist today.      Discussed his weight loss options that I could provide.  The cost of the injectable medication is not reasonable for him.  He is interested in oral medications.  He was assisted with scheduling an appointment to see Dr. Lemon for this.  Offered him pain medication for his wrist but he declines.  He can take otc tylenol if needed.  Will make further recommendations once xray results received.

## 2024-11-05 DIAGNOSIS — M25.532 LEFT WRIST PAIN: Primary | ICD-10-CM

## 2024-11-05 NOTE — PROGRESS NOTES
No fracture or displacement noted on xray of your wrist however due to your symptoms and swelling I will order a CT scan to further assess.

## 2024-12-06 ENCOUNTER — OFFICE VISIT (OUTPATIENT)
Dept: FAMILY MEDICINE | Facility: CLINIC | Age: 74
End: 2024-12-06
Payer: MEDICARE

## 2024-12-06 VITALS
WEIGHT: 273.06 LBS | OXYGEN SATURATION: 97 % | HEIGHT: 72 IN | HEART RATE: 60 BPM | BODY MASS INDEX: 36.99 KG/M2 | TEMPERATURE: 97 F

## 2024-12-06 DIAGNOSIS — G47.33 OSA TREATED WITH BIPAP: ICD-10-CM

## 2024-12-06 DIAGNOSIS — R41.89 IMPAIRED COGNITION: ICD-10-CM

## 2024-12-06 DIAGNOSIS — Z79.82 ASPIRIN LONG-TERM USE: ICD-10-CM

## 2024-12-06 DIAGNOSIS — I10 HYPERTENSION, ESSENTIAL: Primary | ICD-10-CM

## 2024-12-06 DIAGNOSIS — Z12.5 PROSTATE CANCER SCREENING: ICD-10-CM

## 2024-12-06 DIAGNOSIS — R73.03 PREDIABETES: ICD-10-CM

## 2024-12-06 DIAGNOSIS — R35.0 URINARY FREQUENCY: ICD-10-CM

## 2024-12-06 DIAGNOSIS — Z91.89 AT HIGH RISK FOR CARDIOVASCULAR DISEASE: ICD-10-CM

## 2024-12-06 DIAGNOSIS — Z90.79 S/P TURP: ICD-10-CM

## 2024-12-06 DIAGNOSIS — E78.2 MIXED HYPERLIPIDEMIA: ICD-10-CM

## 2024-12-06 LAB
BILIRUBIN, UA POC OHS: NEGATIVE
BLOOD, UA POC OHS: ABNORMAL
CLARITY, UA POC OHS: CLEAR
COLOR, UA POC OHS: YELLOW
GLUCOSE, UA POC OHS: NEGATIVE
KETONES, UA POC OHS: NEGATIVE
LEUKOCYTES, UA POC OHS: NEGATIVE
NITRITE, UA POC OHS: NEGATIVE
PH, UA POC OHS: 6.5
PROTEIN, UA POC OHS: NEGATIVE
SPECIFIC GRAVITY, UA POC OHS: 1.01
UROBILINOGEN, UA POC OHS: 1

## 2024-12-06 PROCEDURE — 81003 URINALYSIS AUTO W/O SCOPE: CPT | Mod: PBBFAC,PN | Performed by: FAMILY MEDICINE

## 2024-12-06 PROCEDURE — 99213 OFFICE O/P EST LOW 20 MIN: CPT | Mod: PBBFAC,PN | Performed by: FAMILY MEDICINE

## 2024-12-06 PROCEDURE — 99999 PR PBB SHADOW E&M-EST. PATIENT-LVL III: CPT | Mod: PBBFAC,,, | Performed by: FAMILY MEDICINE

## 2024-12-06 PROCEDURE — G2211 COMPLEX E/M VISIT ADD ON: HCPCS | Mod: S$PBB,,, | Performed by: FAMILY MEDICINE

## 2024-12-06 PROCEDURE — 99999PBSHW POCT URINALYSIS(INSTRUMENT): Mod: PBBFAC,,,

## 2024-12-06 PROCEDURE — 87086 URINE CULTURE/COLONY COUNT: CPT | Performed by: FAMILY MEDICINE

## 2024-12-06 PROCEDURE — 99214 OFFICE O/P EST MOD 30 MIN: CPT | Mod: S$PBB,,, | Performed by: FAMILY MEDICINE

## 2024-12-06 RX ORDER — TOPIRAMATE 25 MG/1
TABLET ORAL
Qty: 60 TABLET | Refills: 1 | Status: SHIPPED | OUTPATIENT
Start: 2024-12-06

## 2024-12-06 RX ORDER — TOPIRAMATE 25 MG/1
25 TABLET ORAL 2 TIMES DAILY
COMMUNITY
End: 2024-12-06 | Stop reason: SDUPTHER

## 2024-12-06 RX ORDER — ROSUVASTATIN CALCIUM 5 MG/1
5 TABLET, COATED ORAL DAILY
Qty: 90 TABLET | Refills: 1 | Status: SHIPPED | OUTPATIENT
Start: 2024-12-06

## 2024-12-06 RX ORDER — DONEPEZIL HYDROCHLORIDE 10 MG/1
10 TABLET, FILM COATED ORAL DAILY
Qty: 90 TABLET | Refills: 1 | Status: SHIPPED | OUTPATIENT
Start: 2024-12-06

## 2024-12-06 NOTE — PROGRESS NOTES
SCRIBE #1 NOTE: I, Raul Gray, am scribing for, and in the presence of, Wilfrido Lemon III, MD. I have scribed the entire note.     Subjective:       Patient ID: Aaron Ross is a 74 y.o. male.    Chief Complaint: Weight Loss and Follow-up    Aaron Ross is a 74 y.o. male who presents for weight loss options. Mr. Ross was last seen 4/1/24 for a follow-up. S/P total left knee arthroplasty. S/P total right knee arthroplasty. S/P TURP.  Does not manage a diet. The patient is currently complaining that he has difficulty ambulating and getting up in the morning. At night, especially if he has consumed caffeine in the day, he has a hard time with constant sleep. Mr. Ross also notes that a couple of weeks ago his nose began to swell. Mild cognitive impairment. Hypertension is controlled. BP is 136/72. Cardiovascular good. No chest pain. No palpitations. Daily Aspirin 81 mg use. HLD. On Crestor 5 mg. BMI is 37.03. TSH is fine. Obstructive sleep apnea. Using BiPAP regularly, compliant, benefiting from this. RSV and Shingles vaccinations due. Influenza vaccination up to date.  Prediabetic screening due. Last PSA was 6/2023. Nocturia. Gets up 2x a night, sometimes 3. No dysuria, hematuria, or urgency. All other care gaps discussed.           Review of Systems   Constitutional:  Negative for chills and fever.   HENT:  Negative for congestion and sore throat.         Nasal swelling    Eyes:  Negative for pain and visual disturbance.   Respiratory:  Negative for chest tightness and shortness of breath.    Cardiovascular:  Negative for chest pain and palpitations.   Gastrointestinal:  Negative for nausea.   Endocrine: Negative for polydipsia and polyuria.   Genitourinary:  Positive for frequency. Negative for dysuria, flank pain, hematuria and urgency.   Musculoskeletal:  Positive for arthralgias. Negative for back pain, neck pain and neck stiffness.   Skin:  Negative for rash.   Allergic/Immunologic:  Negative for immunocompromised state.   Neurological:  Negative for dizziness and weakness.   Hematological:  Does not bruise/bleed easily.   Psychiatric/Behavioral:  Positive for sleep disturbance. Negative for agitation and behavioral problems.    All other systems reviewed and are negative.      Objective:      Physical examination: Vital signs noted. No acute distress. No abnormality but the bridge of his nose is compressed secondary to the wear of glasses. No carotid bruit. Regular heart rate and rhythm. Lungs clear to auscultation bilaterally. Abdomen bowel sounds are positive soft and nontender. Extremities without edema. 2+ pedal pulses.      Assessment:       1. Hypertension, essential    2. At high risk for cardiovascular disease    3. Mixed hyperlipidemia    4. Aspirin long-term use    5. Prediabetes    6. SIMON treated with BiPAP    7. Impaired cognition    8. Prostate cancer screening    9. S/P TURP    10. BMI 37.0-37.9, adult    11. Urinary frequency        Plan:       Hypertension, essential  -     Comprehensive Metabolic Panel; Future; Expected date: 12/06/2024    At high risk for cardiovascular disease    Mixed hyperlipidemia  -     Lipid Panel; Future; Expected date: 12/06/2024    Aspirin long-term use  -     CBC Auto Differential; Future; Expected date: 12/06/2024    Prediabetes  -     Hemoglobin A1C; Future; Expected date: 12/06/2024    SIMON treated with BiPAP    Impaired cognition    Prostate cancer screening  -     PSA, Screening; Future; Expected date: 12/06/2024    S/P TURP    BMI 37.0-37.9, adult    Urinary frequency  -     POCT Urinalysis(Instrument)  -     Urine culture    Other orders  -     donepeziL (ARICEPT) 10 MG tablet; Take 1 tablet (10 mg total) by mouth once daily.  Dispense: 90 tablet; Refill: 1  -     rosuvastatin (CRESTOR) 5 MG tablet; Take 1 tablet (5 mg total) by mouth once daily.  Dispense: 90 tablet; Refill: 1  -     topiramate (TOPAMAX) 25 MG tablet; Take one tablets po  nightly. Then take one tablet po bid  Dispense: 60 tablet; Refill: 1    A1c CMP CBC lipids PSA ordered.  Urinalysis checked normal.  Urine culture and sensitivity sent.  Refill Aricept and Cialis.  RSV vaccine discussed and recommended.  Discussed weight loss issues.  Discussed trying Topamax for this.  Topamax 25 HS for 7 days then 25 b.i.d. 60 with a refill.  Follow-up in one-month.    I, Dr Wilfrido Lemon, personally performed the services described in this documentation. All medical record entries made by the scribe were at my direction and in my presence. I have reviewed the chart and agree that the record reflects my personal performance and is accurate and complete.

## 2024-12-08 PROBLEM — Z90.79 S/P TURP: Status: ACTIVE | Noted: 2024-12-08

## 2024-12-08 LAB — BACTERIA UR CULT: NORMAL

## 2024-12-13 ENCOUNTER — OFFICE VISIT (OUTPATIENT)
Dept: URGENT CARE | Facility: CLINIC | Age: 74
End: 2024-12-13
Payer: MEDICARE

## 2024-12-13 ENCOUNTER — PATIENT MESSAGE (OUTPATIENT)
Dept: FAMILY MEDICINE | Facility: CLINIC | Age: 74
End: 2024-12-13
Payer: MEDICARE

## 2024-12-13 VITALS
RESPIRATION RATE: 16 BRPM | HEIGHT: 73 IN | DIASTOLIC BLOOD PRESSURE: 76 MMHG | WEIGHT: 270 LBS | HEART RATE: 63 BPM | SYSTOLIC BLOOD PRESSURE: 137 MMHG | BODY MASS INDEX: 35.78 KG/M2 | OXYGEN SATURATION: 96 % | TEMPERATURE: 99 F

## 2024-12-13 DIAGNOSIS — J01.40 ACUTE NON-RECURRENT PANSINUSITIS: ICD-10-CM

## 2024-12-13 DIAGNOSIS — R05.9 COUGH, UNSPECIFIED TYPE: Primary | ICD-10-CM

## 2024-12-13 LAB
CTP QC/QA: YES
CTP QC/QA: YES
FLUAV AG NPH QL: NEGATIVE
FLUBV AG NPH QL: NEGATIVE
SARS-COV-2 AG RESP QL IA.RAPID: NEGATIVE

## 2024-12-13 RX ORDER — AMOXICILLIN AND CLAVULANATE POTASSIUM 875; 125 MG/1; MG/1
1 TABLET, FILM COATED ORAL 2 TIMES DAILY
Qty: 20 TABLET | Refills: 0 | Status: SHIPPED | OUTPATIENT
Start: 2024-12-13 | End: 2024-12-23

## 2024-12-13 RX ORDER — CHLOPHEDIANOL HCL AND PYRILAMINE MALEATE 12.5; 12.5 MG/5ML; MG/5ML
5 SOLUTION ORAL
Qty: 118 ML | Refills: 0 | Status: SHIPPED | OUTPATIENT
Start: 2024-12-13

## 2024-12-13 RX ORDER — OLOPATADINE HYDROCHLORIDE 1 MG/ML
1 SOLUTION/ DROPS OPHTHALMIC 2 TIMES DAILY
Qty: 5 ML | Refills: 0 | Status: SHIPPED | OUTPATIENT
Start: 2024-12-13 | End: 2025-12-13

## 2024-12-13 RX ORDER — AZELASTINE 1 MG/ML
2 SPRAY, METERED NASAL 2 TIMES DAILY
Qty: 30 ML | Refills: 0 | Status: SHIPPED | OUTPATIENT
Start: 2024-12-13 | End: 2025-12-13

## 2024-12-13 NOTE — PROGRESS NOTES
"Subjective:      Patient ID: Aaron Ross is a 74 y.o. male.    Vitals:  height is 6' 1" (1.854 m) and weight is 122.5 kg (270 lb). His temperature is 98.5 °F (36.9 °C). His blood pressure is 137/76 and his pulse is 63. His respiration is 16 and oxygen saturation is 96%.     Chief Complaint: Sinus Problem    Patient is a 74-year-old male with a past medical history of cognitive impairment, hypertension, hyperlipidemia, BPH, status post TURP, anemia, thyroid disease, and obstructive sleep apnea who presents to clinic for evaluation of sinus related issues.  Patient states also feels somewhat like flu-like symptoms.  Patient reports symptoms for at least 1 week now.  Patient reports taking over-the-counter medications with mild relief to symptoms.  Patient reports he is vaccinated.  Patient reports no recent or known sick exposures.    Sinus Problem  This is a new problem. The current episode started 1 to 4 weeks ago. The problem is unchanged. There has been no fever. Associated symptoms include congestion, coughing, headaches, sinus pressure and sneezing. Pertinent negatives include no chills, diaphoresis, ear pain, shortness of breath or sore throat. (Watery eyes )       Constitution: Positive for fatigue. Negative for chills, sweating and fever.   HENT:  Positive for congestion, postnasal drip and sinus pressure. Negative for ear pain and sore throat.    Neck: neck negative.   Cardiovascular: Negative.  Negative for chest pain and palpitations.   Eyes:  Positive for eye discharge (Watery) and eye itching. Negative for eye pain and eye redness.   Respiratory:  Positive for cough and sputum production (Reports mostly dry but states intermittent mucus production). Negative for chest tightness and shortness of breath.    Gastrointestinal: Negative.  Negative for abdominal pain, nausea, vomiting and diarrhea.   Endocrine: negative.   Genitourinary: Negative.    Musculoskeletal:  Positive for muscle ache. "   Skin: Negative.  Negative for color change, pale, rash and erythema.   Allergic/Immunologic: Negative.  Positive for sneezing.   Neurological:  Positive for headaches. Negative for dizziness, light-headedness, passing out, disorientation and altered mental status.   Hematologic/Lymphatic: Negative.    Psychiatric/Behavioral: Negative.  Negative for altered mental status, disorientation and confusion.       Objective:     Physical Exam   Constitutional: He is oriented to person, place, and time. He appears well-developed. He is cooperative.  Non-toxic appearance. He does not appear ill. No distress. obesity  HENT:   Head: Normocephalic and atraumatic.   Ears:   Right Ear: Hearing, tympanic membrane, external ear and ear canal normal.   Left Ear: Hearing, tympanic membrane, external ear and ear canal normal.   Nose: Mucosal edema and congestion present. No rhinorrhea or nasal deformity. No epistaxis. Right sinus exhibits maxillary sinus tenderness and frontal sinus tenderness. Left sinus exhibits maxillary sinus tenderness and frontal sinus tenderness.   Mouth/Throat: Uvula is midline, oropharynx is clear and moist and mucous membranes are normal. Mucous membranes are moist. No trismus in the jaw. Normal dentition. No uvula swelling. No oropharyngeal exudate or posterior oropharyngeal erythema (Postnasal drainage to oropharynx). Oropharynx is clear.   Eyes: Conjunctivae and lids are normal. Pupils are equal, round, and reactive to light. Right eye exhibits discharge. Left eye exhibits discharge. No scleral icterus.   Neck: Trachea normal and phonation normal. Neck supple. No neck rigidity present.   Cardiovascular: Normal rate, regular rhythm and normal pulses.   Pulmonary/Chest: Effort normal and breath sounds normal. No respiratory distress (Unlabored.  Equal rise and fall of chest.  Able speak in full complete sentences.  No adventitious breath sounds noted.). He has no wheezes. He has no rhonchi. He has no  rales.   Abdominal: Normal appearance and bowel sounds are normal. He exhibits no distension. Soft. There is no abdominal tenderness.   Musculoskeletal: Normal range of motion.         General: Normal range of motion.      Cervical back: He exhibits no tenderness.   Lymphadenopathy:     He has no cervical adenopathy.   Neurological: He is alert and oriented to person, place, and time. He exhibits normal muscle tone.   Skin: Skin is warm, dry, intact, not diaphoretic, not pale and no rash. Capillary refill takes 2 to 3 seconds. No erythema   Psychiatric: His speech is normal and behavior is normal. Judgment and thought content normal.   Nursing note and vitals reviewed.      Assessment:     1. Cough, unspecified type    2. Acute non-recurrent pansinusitis        Plan:       Cough, unspecified type  -     SARS Coronavirus 2 Antigen, POCT Manual Read  -     POCT Influenza A/B Rapid Antigen    Acute non-recurrent pansinusitis    Other orders  -     amoxicillin-clavulanate 875-125mg (AUGMENTIN) 875-125 mg per tablet; Take 1 tablet by mouth 2 (two) times daily. for 10 days  Dispense: 20 tablet; Refill: 0  -     azelastine (ASTELIN) 137 mcg (0.1 %) nasal spray; 2 sprays (274 mcg total) by Nasal route 2 (two) times daily.  Dispense: 30 mL; Refill: 0  -     pyrilamine-chlophedianoL (NINJACOF) 12.5-12.5 mg/5 mL Liqd; Take 5 mLs by mouth every 6 to 8 hours as needed (Cough).  Dispense: 118 mL; Refill: 0  -     olopatadine (PATANOL) 0.1 % ophthalmic solution; Place 1 drop into both eyes 2 (two) times daily.  Dispense: 5 mL; Refill: 0                Labs:  Influenza a and B negative.  COVID negative.  Take medications as prescribed.  Tylenol/Motrin per package instructions for any pain or fever.  Nasal saline flushes or irrigation as directed.  Assure adequate hydration and rest.  Follow-up with PCP in 1-2 days.  Return to clinic as needed.  To ED for any new or acutely worsening symptoms.  Patient in agreement with plan of  care.    DISCLAIMER: Please note that my documentation in this Electronic Healthcare Record was produced using speech recognition software and therefore may contain errors related to that software system.These could include grammar, punctuation and spelling errors or the inclusion/exclusion of phrases that were not intended. Garbled syntax, mangled pronouns, and other bizarre constructions may be attributed to that software system.

## 2024-12-27 ENCOUNTER — PATIENT MESSAGE (OUTPATIENT)
Dept: FAMILY MEDICINE | Facility: CLINIC | Age: 74
End: 2024-12-27
Payer: MEDICARE

## 2025-01-10 ENCOUNTER — LAB VISIT (OUTPATIENT)
Dept: LAB | Facility: HOSPITAL | Age: 75
End: 2025-01-10
Attending: FAMILY MEDICINE
Payer: MEDICARE

## 2025-01-10 DIAGNOSIS — I10 HYPERTENSION, ESSENTIAL: ICD-10-CM

## 2025-01-10 DIAGNOSIS — Z79.82 ASPIRIN LONG-TERM USE: ICD-10-CM

## 2025-01-10 DIAGNOSIS — R73.03 PREDIABETES: ICD-10-CM

## 2025-01-10 DIAGNOSIS — Z12.5 PROSTATE CANCER SCREENING: ICD-10-CM

## 2025-01-10 DIAGNOSIS — E78.2 MIXED HYPERLIPIDEMIA: ICD-10-CM

## 2025-01-10 LAB
ALBUMIN SERPL BCP-MCNC: 3.9 G/DL (ref 3.5–5.2)
ALP SERPL-CCNC: 142 U/L (ref 55–135)
ALT SERPL W/O P-5'-P-CCNC: 40 U/L (ref 10–44)
ANION GAP SERPL CALC-SCNC: 5 MMOL/L (ref 8–16)
AST SERPL-CCNC: 26 U/L (ref 10–40)
BASOPHILS # BLD AUTO: 0.03 K/UL (ref 0–0.2)
BASOPHILS NFR BLD: 0.5 % (ref 0–1.9)
BILIRUB SERPL-MCNC: 0.8 MG/DL (ref 0.1–1)
BUN SERPL-MCNC: 16 MG/DL (ref 8–23)
CALCIUM SERPL-MCNC: 9 MG/DL (ref 8.7–10.5)
CHLORIDE SERPL-SCNC: 107 MMOL/L (ref 95–110)
CHOLEST SERPL-MCNC: 123 MG/DL (ref 120–199)
CHOLEST/HDLC SERPL: 3.1 {RATIO} (ref 2–5)
CO2 SERPL-SCNC: 29 MMOL/L (ref 23–29)
COMPLEXED PSA SERPL-MCNC: 1.76 NG/ML (ref 0–4)
CREAT SERPL-MCNC: 1.1 MG/DL (ref 0.5–1.4)
DIFFERENTIAL METHOD BLD: ABNORMAL
EOSINOPHIL # BLD AUTO: 0.2 K/UL (ref 0–0.5)
EOSINOPHIL NFR BLD: 3.5 % (ref 0–8)
ERYTHROCYTE [DISTWIDTH] IN BLOOD BY AUTOMATED COUNT: 13.2 % (ref 11.5–14.5)
EST. GFR  (NO RACE VARIABLE): >60 ML/MIN/1.73 M^2
ESTIMATED AVG GLUCOSE: 126 MG/DL (ref 68–131)
GLUCOSE SERPL-MCNC: 111 MG/DL (ref 70–110)
HBA1C MFR BLD: 6 % (ref 4.5–6.2)
HCT VFR BLD AUTO: 43 % (ref 40–54)
HDLC SERPL-MCNC: 40 MG/DL (ref 40–75)
HDLC SERPL: 32.5 % (ref 20–50)
HGB BLD-MCNC: 14.1 G/DL (ref 14–18)
IMM GRANULOCYTES # BLD AUTO: 0.02 K/UL (ref 0–0.04)
IMM GRANULOCYTES NFR BLD AUTO: 0.3 % (ref 0–0.5)
LDLC SERPL CALC-MCNC: 65.8 MG/DL (ref 63–159)
LYMPHOCYTES # BLD AUTO: 1.7 K/UL (ref 1–4.8)
LYMPHOCYTES NFR BLD: 29.3 % (ref 18–48)
MCH RBC QN AUTO: 31.2 PG (ref 27–31)
MCHC RBC AUTO-ENTMCNC: 32.8 G/DL (ref 32–36)
MCV RBC AUTO: 95 FL (ref 82–98)
MONOCYTES # BLD AUTO: 0.9 K/UL (ref 0.3–1)
MONOCYTES NFR BLD: 14.7 % (ref 4–15)
NEUTROPHILS # BLD AUTO: 3 K/UL (ref 1.8–7.7)
NEUTROPHILS NFR BLD: 51.7 % (ref 38–73)
NONHDLC SERPL-MCNC: 83 MG/DL
NRBC BLD-RTO: 0 /100 WBC
PLATELET # BLD AUTO: 166 K/UL (ref 150–450)
PMV BLD AUTO: 10.2 FL (ref 9.2–12.9)
POTASSIUM SERPL-SCNC: 4.1 MMOL/L (ref 3.5–5.1)
PROT SERPL-MCNC: 6.8 G/DL (ref 6–8.4)
RBC # BLD AUTO: 4.52 M/UL (ref 4.6–6.2)
SODIUM SERPL-SCNC: 141 MMOL/L (ref 136–145)
TRIGL SERPL-MCNC: 86 MG/DL (ref 30–150)
WBC # BLD AUTO: 5.77 K/UL (ref 3.9–12.7)

## 2025-01-10 PROCEDURE — 83036 HEMOGLOBIN GLYCOSYLATED A1C: CPT | Performed by: FAMILY MEDICINE

## 2025-01-10 PROCEDURE — 80053 COMPREHEN METABOLIC PANEL: CPT | Performed by: FAMILY MEDICINE

## 2025-01-10 PROCEDURE — 36415 COLL VENOUS BLD VENIPUNCTURE: CPT | Performed by: FAMILY MEDICINE

## 2025-01-10 PROCEDURE — 80061 LIPID PANEL: CPT | Performed by: FAMILY MEDICINE

## 2025-01-10 PROCEDURE — 85025 COMPLETE CBC W/AUTO DIFF WBC: CPT | Performed by: FAMILY MEDICINE

## 2025-01-10 PROCEDURE — 84153 ASSAY OF PSA TOTAL: CPT | Performed by: FAMILY MEDICINE

## 2025-01-23 ENCOUNTER — OFFICE VISIT (OUTPATIENT)
Dept: FAMILY MEDICINE | Facility: CLINIC | Age: 75
End: 2025-01-23
Payer: MEDICARE

## 2025-01-23 ENCOUNTER — HOSPITAL ENCOUNTER (OUTPATIENT)
Dept: RADIOLOGY | Facility: HOSPITAL | Age: 75
Discharge: HOME | End: 2025-01-23
Attending: FAMILY MEDICINE
Payer: MEDICARE

## 2025-01-23 VITALS
BODY MASS INDEX: 35.44 KG/M2 | DIASTOLIC BLOOD PRESSURE: 80 MMHG | HEART RATE: 60 BPM | OXYGEN SATURATION: 95 % | TEMPERATURE: 99 F | SYSTOLIC BLOOD PRESSURE: 134 MMHG | WEIGHT: 267.44 LBS | HEIGHT: 73 IN

## 2025-01-23 DIAGNOSIS — L03.116 CELLULITIS OF LEFT FOOT: ICD-10-CM

## 2025-01-23 DIAGNOSIS — I10 HYPERTENSION, ESSENTIAL: Primary | ICD-10-CM

## 2025-01-23 DIAGNOSIS — J32.9 SINUSITIS, UNSPECIFIED CHRONICITY, UNSPECIFIED LOCATION: ICD-10-CM

## 2025-01-23 DIAGNOSIS — E66.01 SEVERE OBESITY (BMI 35.0-35.9 WITH COMORBIDITY): ICD-10-CM

## 2025-01-23 DIAGNOSIS — M79.672 LEFT FOOT PAIN: ICD-10-CM

## 2025-01-23 DIAGNOSIS — E78.5 HYPERLIPIDEMIA, UNSPECIFIED HYPERLIPIDEMIA TYPE: ICD-10-CM

## 2025-01-23 DIAGNOSIS — R73.03 PREDIABETES: ICD-10-CM

## 2025-01-23 PROCEDURE — 73630 X-RAY EXAM OF FOOT: CPT | Mod: 26,LT,, | Performed by: RADIOLOGY

## 2025-01-23 PROCEDURE — 99214 OFFICE O/P EST MOD 30 MIN: CPT | Mod: S$PBB,,, | Performed by: FAMILY MEDICINE

## 2025-01-23 PROCEDURE — G2211 COMPLEX E/M VISIT ADD ON: HCPCS | Mod: S$PBB,,, | Performed by: FAMILY MEDICINE

## 2025-01-23 PROCEDURE — 73630 X-RAY EXAM OF FOOT: CPT | Mod: TC,LT

## 2025-01-23 PROCEDURE — 99213 OFFICE O/P EST LOW 20 MIN: CPT | Mod: PBBFAC,PN | Performed by: FAMILY MEDICINE

## 2025-01-23 PROCEDURE — 99999 PR PBB SHADOW E&M-EST. PATIENT-LVL III: CPT | Mod: PBBFAC,,, | Performed by: FAMILY MEDICINE

## 2025-01-23 RX ORDER — DOXYCYCLINE 100 MG/1
100 CAPSULE ORAL 2 TIMES DAILY
Qty: 20 CAPSULE | Refills: 0 | Status: SHIPPED | OUTPATIENT
Start: 2025-01-23

## 2025-01-23 NOTE — PROGRESS NOTES
SCRIBE #1 NOTE: I, Sundar Ramirez, am scribing for, and in the presence of,  Wilfrido Lemon III, MD. I have scribed the entire note.     Subjective:       Patient ID: Aaron Ross is a 74 y.o. male.    Chief Complaint: Follow-up (Has swollen foot)    Mr. Ross is here for a follow up after his last appointment on December 6, 2024. Sinusitis. He is having some nasal congestion, runny nose, and he was having some choking today. This started up again yesterday. He took Amoxicillin a while back for 10 days, but he is doing better overall. No fever and not coughing much. He is sneezing. Cellulitis of left foot. Reports on December 26, 2024, he hit the curb at [employer] with cooper feet and fell down. His right foot is okay. States his left foot has been swollen for 1 month and has a burning sensation. The pain is across the top of his foot along with some redness. It has been red for years. There is pain when walking on it. States the skin itself can be warm to touch. There is no lesion or open wound on the skin. BMI of 35.3. He did not try Topamax. Cardiovascular good. No chest pain. No palpitations. Blood pressure is 134/80. Hypertension controlled. Hyperlipidemia. On Crestor. Cholesterol is 123. HDL is 40. LDL is 66. Prediabetes. A1C is 6.0, was 5.6. Blood glucose is 111. Using aspirin. Alkaline phosphatase is 142. CBC is okay. PSA is 1.76.     Review of Systems   Constitutional:  Negative for chills and fever.   HENT:  Positive for congestion. Negative for sore throat.    Eyes:  Negative for pain and visual disturbance.   Respiratory:  Positive for cough (slight) and choking (today). Negative for chest tightness and shortness of breath.    Cardiovascular:  Negative for chest pain.   Gastrointestinal:  Negative for nausea.   Endocrine: Negative for polydipsia and polyuria.   Genitourinary:  Negative for dysuria and flank pain.   Musculoskeletal:  Positive for myalgias. Negative for back pain, neck pain  and neck stiffness.        Swelling on top foot   Skin:  Positive for color change. Negative for rash.   Allergic/Immunologic: Negative for immunocompromised state.   Neurological:  Negative for dizziness and weakness.   Hematological:  Does not bruise/bleed easily.   Psychiatric/Behavioral:  Negative for agitation and behavioral problems.    All other systems reviewed and are negative.      Objective:      Physical examination: Vital signs noted. No acute distress. No carotid bruit. Regular heart rate and rhythm. Lungs clear to auscultation bilaterally. Abdomen bowel sounds positive soft and nontender. Extremities without edema. 2+ pedal pulses. Left distal dorsum of foot is swollen, red, and warm.       Assessment:       1. Hypertension, essential    2. Sinusitis, unspecified chronicity, unspecified location    3. Cellulitis of left foot    4. BMI 35.0-35.9,adult    5. Left foot pain    6. Prediabetes    7. Hyperlipidemia, unspecified hyperlipidemia type    8. Severe obesity (BMI 35.0-35.9 with comorbidity)        Plan:       Hypertension, essential    Sinusitis, unspecified chronicity, unspecified location    Cellulitis of left foot  -     X-Ray Foot Complete Left; Future; Expected date: 01/23/2025    BMI 35.0-35.9,adult    Left foot pain  -     X-Ray Foot Complete Left; Future; Expected date: 01/23/2025    Prediabetes    Hyperlipidemia, unspecified hyperlipidemia type    Severe obesity (BMI 35.0-35.9 with comorbidity)    Other orders  -     doxycycline (VIBRAMYCIN) 100 MG Cap; Take 1 capsule (100 mg total) by mouth 2 (two) times a day.  Dispense: 20 capsule; Refill: 0    The foot is warm and reddened like a cellulitis.  But trauma makes me suspicious for occult fracture.  Will get an x-ray.  Vibramycin also.  Long discussion of low-fat diet.  Aid him in his diet weight reduction.  Misconceptions corrected.  He does not wish to try the Topamax.  Sinuses seem to have resolved.  All care gaps addressed or  discussed.     I, Wilfrido Lemon III, MD, personally performed the services described in this documentation. All medical record entries made by the scribe were at my direction and in my presence. I have reviewed the chart and agree that the record reflects my personal performance and is accurate and complete.

## 2025-01-27 ENCOUNTER — TELEPHONE (OUTPATIENT)
Dept: FAMILY MEDICINE | Facility: CLINIC | Age: 75
End: 2025-01-27
Payer: MEDICARE

## 2025-01-27 DIAGNOSIS — M79.673 PAIN OF FOOT, UNSPECIFIED LATERALITY: Primary | ICD-10-CM

## 2025-01-30 ENCOUNTER — TELEPHONE (OUTPATIENT)
Dept: FAMILY MEDICINE | Facility: CLINIC | Age: 75
End: 2025-01-30
Payer: MEDICARE

## 2025-01-30 NOTE — TELEPHONE ENCOUNTER
Re faxed orders to [facility] lab in [facility]. fax [phone].

## 2025-02-22 DIAGNOSIS — Z00.00 ENCOUNTER FOR MEDICARE ANNUAL WELLNESS EXAM: ICD-10-CM

## 2025-04-03 RX ORDER — DONEPEZIL HYDROCHLORIDE 10 MG/1
10 TABLET, FILM COATED ORAL
Qty: 90 TABLET | Refills: 1 | Status: SHIPPED | OUTPATIENT
Start: 2025-04-03

## 2025-04-17 ENCOUNTER — PATIENT MESSAGE (OUTPATIENT)
Dept: FAMILY MEDICINE | Facility: CLINIC | Age: 75
End: 2025-04-17
Payer: MEDICARE

## 2025-04-21 RX ORDER — ROSUVASTATIN CALCIUM 5 MG/1
5 TABLET, COATED ORAL DAILY
Qty: 90 TABLET | Refills: 1 | Status: SHIPPED | OUTPATIENT
Start: 2025-04-21

## 2025-04-21 NOTE — TELEPHONE ENCOUNTER
No care due was identified.  [facility] Embedded Care Due Messages. Reference number: [id number].   4/21/2025 10:52:46 AM CDT

## 2025-07-02 ENCOUNTER — OFFICE VISIT (OUTPATIENT)
Dept: CARDIOLOGY | Facility: CLINIC | Age: 75
End: 2025-07-02
Payer: MEDICARE

## 2025-07-02 ENCOUNTER — HOSPITAL ENCOUNTER (OUTPATIENT)
Dept: CARDIOLOGY | Facility: CLINIC | Age: 75
Discharge: HOME | End: 2025-07-02
Attending: INTERNAL MEDICINE
Payer: MEDICARE

## 2025-07-02 VITALS — HEART RATE: 56 BPM | BODY MASS INDEX: 34.97 KG/M2 | WEIGHT: 263.88 LBS | HEIGHT: 73 IN | OXYGEN SATURATION: 98 %

## 2025-07-02 DIAGNOSIS — R42 LIGHTHEADED: ICD-10-CM

## 2025-07-02 DIAGNOSIS — R00.1 BRADYCARDIA: Primary | ICD-10-CM

## 2025-07-02 DIAGNOSIS — E78.5 DYSLIPIDEMIA: ICD-10-CM

## 2025-07-02 DIAGNOSIS — R00.1 BRADYCARDIA: ICD-10-CM

## 2025-07-02 PROCEDURE — 99213 OFFICE O/P EST LOW 20 MIN: CPT | Mod: PBBFAC,PN | Performed by: INTERNAL MEDICINE

## 2025-07-02 PROCEDURE — 99214 OFFICE O/P EST MOD 30 MIN: CPT | Mod: S$PBB,,, | Performed by: INTERNAL MEDICINE

## 2025-07-02 PROCEDURE — 99999 PR PBB SHADOW E&M-EST. PATIENT-LVL III: CPT | Mod: PBBFAC,,, | Performed by: INTERNAL MEDICINE

## 2025-07-02 NOTE — PROGRESS NOTES
"[facility] [place] [facility] [place]    Subjective:     Patient ID:  Aaron Ross is a 75 y.o. male patient here for evaluation Hypertension (Annual )      History of Present Illness    CHIEF COMPLAINT:  Patient presents today for follow up.    DIZZINESS AND BALANCE:  He reports several types of dizziness: room spinning half a turn when lying down at night, mild dizziness upon standing quickly, and intermittent lightheadedness described as an "out of body experience" primarily when exposed to strong outdoor light while driving or walking. He denies morning dizziness or persistent frequency. Symptoms do not significantly impair his daily activities.    HYPERHIDROSIS:  He reports excessive sweating that occurs rapidly upon environmental exposure, particularly when going outside. The sweating continues for 5 minutes after returning indoors. He notes the sweating intensity has increased compared to previous experiences, especially during light physical activity.    WEIGHT LOSS:  He reports significant recent weight loss which he attributes to turmeric and cinnamon supplements. He expresses concern about the magnitude of weight loss and desires evaluation for potential underlying medical conditions.    EXERCISE TOLERANCE:  He maintains a gym routine with heart rate reaching 118 BPM during activity. He denies exertional chest pain, dyspnea, or significant dizziness during exercise.    CHRONIC LEG SWELLING:  He reports chronic leg swelling present for 20 years, describing it as mild but visible. Previously evaluated by vascular surgeon who recommended compression stockings. He declined surgical intervention and manages the condition with compression stockings.    MEDICAL HISTORY:  He has history of normal stress test and echo. He has intermittently elevated BP, though home readings have improved with better control noted at previous visit, requiring no treatment " 9/8. CHW Renato made an attempt to meet with pt bedside to introduce CCM services. Pt sleeping. This CHW will come back at a different time.    initiation.    CURRENT MEDICATIONS:  He takes Crestor 10mg for cholesterol management and Aricept for memory support. LDL cholesterol is 65, below target of 70.      ROS:  ROS is negative unless otherwise indicated in HPI.           ROS     Past Medical History:   Diagnosis Date    Arthritis     BMI 36.0-36.9,adult 06/25/2018    Hypertension     Sleep apnea     USES CPAP    Thyroid disease     Varicose veins of both lower extremities     Wears glasses        Past Surgical History:   Procedure Laterality Date    BACK SURGERY      PAPAYA JUICE INJECTION/Chemopapain 40yrs ago.    CYSTOSCOPY N/A 07/25/2023    Procedure: CYSTOSCOPY;  Surgeon: Robe Reyes MD;  Location: [facility] OR;  Service: Urology;  Laterality: N/A;    CYSTOSCOPY WITH INSERTION OF MINIMALLY INVASIVE IMPLANT TO ENLARGE PROSTATIC URETHRA N/A 9/14/2023    Procedure: CYSTOSCOPY, WITH INSERTION OF UROLIFT IMPLANT;  Surgeon: Robe Reyes MD;  Location: [facility] OR;  Service: Urology;  Laterality: N/A;    FINGER MASS EXCISION Right 01/19/2023    Procedure: Right small finger mucoid cyst excision;  Surgeon: Anthony Edwards MD;  Location: [facility] OR;  Service: Orthopedics;  Laterality: Right;    JOINT REPLACEMENT Right 10/01/2012    JOINT REPLACEMENT Left     KNEE    KNEE SURGERY  2009    THYROID SURGERY  2013    PARTIAL    TRANSRECTAL ULTRASOUND EXAMINATION N/A 07/25/2023    Procedure: ULTRASOUND, RECTAL APPROACH;  Surgeon: Robe Reyes MD;  Location: [facility] OR;  Service: Urology;  Laterality: N/A;    UVULA  2000    REMOVED FOR SLEEP APNEA       No family history on file.    Social History     Socioeconomic History    Marital status:    Occupational History    Occupation: RETIRED   Tobacco Use    Smoking status: Never    Smokeless tobacco: Never   Substance and Sexual Activity    Alcohol use: Yes     Comment: RARELY    Drug use: No    Sexual activity: Yes     Partners: Female     Social Drivers of Health     Financial Resource Strain:  Low Risk  (6/30/2025)    Overall Financial Resource Strain (CARDIA)     Difficulty of Paying Living Expenses: Not hard at all   Food Insecurity: No Food Insecurity (6/30/2025)    Hunger Vital Sign     Worried About Running Out of Food in the Last Year: Never true     Ran Out of Food in the Last Year: Never true   Transportation Needs: No Transportation Needs (6/30/2025)    PRAPARE - Transportation     Lack of Transportation (Medical): No     Lack of Transportation (Non-Medical): No   Physical Activity: Unknown (6/30/2025)    Exercise Vital Sign     Days of Exercise per Week: 0 days   Stress: No Stress Concern Present (6/30/2025)    [employer] [place] [employer] - Occupational Stress Questionnaire     Feeling of Stress : Only a little   Housing Stability: Low Risk  (6/30/2025)    Housing Stability Vital Sign     Unable to Pay for Housing in the Last Year: No     Number of Times Moved in the Last Year: 0     Homeless in the Last Year: No       Current Medications[1]    Review of patient's allergies indicates:   Allergen Reactions    Adhesive tape-silicones Rash     Allergy to adhesive on gauze - paper & regular tape ok         Objective:        Vitals:    07/02/25 1440   Pulse: (!) 56       Physical Exam  Vitals reviewed.   Constitutional:       Appearance: Normal appearance.   Cardiovascular:      Rate and Rhythm: Regular rhythm. Bradycardia present.      Pulses: Normal pulses.      Heart sounds: Normal heart sounds. No murmur heard.     No gallop.   Pulmonary:      Effort: Pulmonary effort is normal.   Skin:     General: Skin is warm.   Neurological:      General: No focal deficit present.      Mental Status: He is alert and oriented to person, place, and time.   Psychiatric:         Mood and Affect: Mood normal.         LIPIDS - LAST 2   Lab Results   Component Value Date    CHOL 123 01/10/2025    CHOL 147 10/11/2023    HDL 40 01/10/2025    HDL 37 (L) 10/11/2023    LDLCALC 65.8 01/10/2025    LDLCALC 86.8  "10/11/2023    TRIG 86 01/10/2025    TRIG 116 10/11/2023    CHOLHDL 32.5 01/10/2025    CHOLHDL 25.2 10/11/2023       CBC - LAST 2  Lab Results   Component Value Date    WBC 5.77 01/10/2025    WBC 7.28 12/08/2023    RBC 4.52 (L) 01/10/2025    RBC 4.51 (L) 12/08/2023    HGB 14.1 01/10/2025    HGB 14.2 12/08/2023    HCT 43.0 01/10/2025    HCT 43.2 12/08/2023    MCV 95 01/10/2025    MCV 96 12/08/2023    MCH 31.2 (H) 01/10/2025    MCH 31.5 (H) 12/08/2023    MCHC 32.8 01/10/2025    MCHC 32.9 12/08/2023    RDW 13.2 01/10/2025    RDW 12.9 12/08/2023     01/10/2025     12/08/2023    MPV 10.2 01/10/2025    MPV 10.0 12/08/2023    GRAN 3.0 01/10/2025    GRAN 51.7 01/10/2025    LYMPH 1.7 01/10/2025    LYMPH 29.3 01/10/2025    MONO 0.9 01/10/2025    MONO 14.7 01/10/2025    BASO 0.03 01/10/2025    BASO 0.03 12/08/2023    NRBC 0 01/10/2025    NRBC 0 12/08/2023       CHEMISTRY & LIVER FUNCTION - LAST 2  Lab Results   Component Value Date     01/10/2025     12/08/2023    K 4.1 01/10/2025    K 4.4 12/08/2023     01/10/2025     12/08/2023    CO2 29 01/10/2025    CO2 30 (H) 12/08/2023    ANIONGAP 5 (L) 01/10/2025    ANIONGAP 3 (L) 12/08/2023    BUN 16 01/10/2025    BUN 17 12/08/2023    CREATININE 1.1 01/10/2025    CREATININE 1.1 12/08/2023     (H) 01/10/2025    GLU 72 12/08/2023    CALCIUM 9.0 01/10/2025    CALCIUM 9.8 12/08/2023    ALBUMIN 3.9 01/10/2025    ALBUMIN 4.2 12/08/2023    PROT 6.8 01/10/2025    PROT 6.9 12/08/2023    ALKPHOS 142 (H) 01/10/2025    ALKPHOS 96 12/08/2023    ALT 40 01/10/2025    ALT 14 12/08/2023    AST 26 01/10/2025    AST 17 12/08/2023    BILITOT 0.8 01/10/2025    BILITOT 0.5 12/08/2023        CARDIAC PROFILE - LAST 2  Lab Results   Component Value Date    BNP 16 06/11/2020    BNP 8.00 11/12/2018     04/24/2018    TROPONINI <0.030 04/24/2018        COAGULATION - LAST 2  No results found for: "LABPT", "INR", "APTT"    ENDOCRINE & PSA - LAST 2  Lab Results "   Component Value Date    HGBA1C 6.0 01/10/2025    HGBA1C 5.6 10/11/2023    TSH 2.541 12/29/2023    TSH 3.901 10/11/2023    PSA 1.76 01/10/2025    PSA 1.2 06/12/2023        ECHOCARDIOGRAM RESULTS  Results for orders placed during the hospital encounter of 07/11/24    Echo    Interpretation Summary    Left Ventricle: The left ventricle is normal in size. Normal wall thickness. There is normal systolic function with a visually estimated ejection fraction of 55 - 60%.    Right Ventricle: Right ventricle was not well visualized due to poor acoustic window. Systolic function is normal.    Aortic Valve: The aortic valve is a trileaflet valve. There is mild aortic valve sclerosis.    Mitral Valve: There is no stenosis.    Tricuspid Valve: There is mild regurgitation.    IVC/SVC: Normal venous pressure at 3 mmHg.      CURRENT/PREVIOUS VISIT EKG  Results for orders placed or performed in visit on 06/19/24   IN OFFICE EKG 12-LEAD (to [place])    Collection Time: 06/19/24  1:19 PM   Result Value Ref Range    QRS Duration 102 ms    OHS QTC Calculation 424 ms    Narrative    Test Reason : I25.10,    Vent. Rate : 056 BPM     Atrial Rate : 056 BPM     P-R Int : 150 ms          QRS Dur : 102 ms      QT Int : 440 ms       P-R-T Axes : 057 -31 028 degrees     QTc Int : 424 ms    Sinus bradycardia  Left axis deviation  Moderate voltage criteria for LVH, may be normal variant  Abnormal ECG  When compared with ECG of 01-SEP-2023 09:55,  No significant change was found  Confirmed by Lito Son MD ([id number]) on 8/6/2024 12:37:50 PM    Referred By: NATALIE LOPEZ           Confirmed By:Lito Son MD     No valid procedures specified.   Results for orders placed during the hospital encounter of 07/11/24    Nuclear Stress - Cardiology Interpreted    Interpretation Summary    Normal myocardial perfusion scan. There is no evidence of myocardial ischemia or infarction.    The gated perfusion images showed an ejection fraction of 67% at rest. The  gated perfusion images showed an ejection fraction of 66% post stress. Normal ejection fraction is greater than 53%.    The ECG portion of the study is abnormal but not diagnostic.    The patient reported no chest pain during the stress test.    The patient exercised for 5 minutes 21 seconds on a Joselito protocol, corresponding to a functional capacity of 7METS, achieving a peak heart rate of 137 bpm, which is 94% of the age predicted maximum heart rate.    No valid procedures specified.        Assessment:        Assessment & Plan      75-year-old male here for follow-up.  History of pedal edema, does not appear to be heart failure in the past.  Recommended compression stockings by vascular.  No clear symptoms this visit, continues to be active with heart rate going up to 118 beats per minute in the gym additional chest pain or shortness of breath.  Does report that he does get lightheaded and has what he describes his out of body experiences once in a while.  He is bradycardic, he does take memantine, informed him that can cause bradycardia.  Unsure if his symptoms of lightheadedness and associated symptoms are associated with bradycardia not, will obtain a monitor for the same.    PLAN SUMMARY:  - Order heart monitor (patch) to assess heart rate patterns  - Continue Rosuvastatin (Crestor) for cholesterol management  - Contact office if chest pain, chest tightness, shortness of breath, or severe dizziness occur    BRADYCARDIA:  - Heart rate 53 bpm on EKG.  - Slow heart rate possibly causing dizziness, particularly at night.  - Aricept potentially impacting heart rate.  - Ordered heart monitor (patch) to be worn for a few days to assess heart rate patterns, particularly during dizzy episodes at night.    HYPERLIPIDEMIA:  - LDL cholesterol at 65 mg/dL, within target range (below 70 mg/dL).  - Continued Rosuvastatin (Crestor) for cholesterol.    CARDIAC FUNCTION:  - Cardiac function adequate based on ability to exercise  without chest pain or shortness of breath, and previous normal stress test results.  - Explained that heart disease can be unpredictable and that normal test results do not guarantee absence of future cardiac events.  - Clarified that light sensitivity is not typically associated with heart-related lightheadedness.  - Contact the office if chest pain, chest tightness, shortness of breath, or severe dizziness occur.    GENERAL HEALTH SCREENING:  - Discussed limitations of routine labs in detecting cancer, emphasizing need for targeted screening based on specific symptoms or risk factors.        1. Bradycardia    2. Lightheaded    3. Dyslipidemia           Plan:       Bradycardia  -     IN OFFICE EKG 12-LEAD (to [facility])  -     Cardiac Monitor - 3-15 Day Adult ([facility] Only); Future    Lightheaded  -     Cardiac Monitor - 3-15 Day Adult ([facility] Only); Future    Dyslipidemia      Follow up in about 1 year (around 7/2/2026) for NP for bradycardia.        All pertinent data including labs, imaging, EKGs, and studies listed above were reviewed personally.  Patient's most recent EKG tracing was personally interpreted by this provider.    This note was generated with the assistance of ambient listening technology. Verbal consent was obtained by the patient and accompanying visitor(s) for the recording of patient appointment to facilitate this note. I attest to having reviewed and edited the generated note for accuracy, though some syntax or spelling errors may persist. Please contact the author of this note for any clarification.      MD [facility] [place] [facility] [place]         [1]   Current Outpatient Medications   Medication Sig Dispense Refill    aspirin (ECOTRIN) 81 MG EC tablet Take 81 mg by mouth once daily.      azelastine (ASTELIN) 137 mcg (0.1 %) nasal spray 2 sprays (274 mcg total) by Nasal route 2 (two) times daily. 30 mL 0    cinnamon bark (CINNAMON ORAL) Take by  mouth Daily.      donepeziL (ARICEPT) 10 MG tablet TAKE 1 TABLET(10 MG) BY MOUTH DAILY 90 tablet 1    ketoconazole (NIZORAL) 2 % shampoo Apply 1 Applicatorful topically every other day. 480 mL 3    rosuvastatin (CRESTOR) 5 MG tablet Take 1 tablet (5 mg total) by mouth once daily. 90 tablet 1    TURMERIC ORAL Take by mouth Daily.      doxycycline (VIBRAMYCIN) 100 MG Cap Take 1 capsule (100 mg total) by mouth 2 (two) times a day. (Patient not taking: Reported on 7/2/2025) 20 capsule 0     No current facility-administered medications for this visit.

## 2025-07-07 ENCOUNTER — HOSPITAL ENCOUNTER (OUTPATIENT)
Dept: CARDIOLOGY | Facility: CLINIC | Age: 75
Discharge: HOME | End: 2025-07-07
Attending: INTERNAL MEDICINE
Payer: MEDICARE

## 2025-07-30 ENCOUNTER — PATIENT MESSAGE (OUTPATIENT)
Dept: FAMILY MEDICINE | Facility: CLINIC | Age: 75
End: 2025-07-30
Payer: MEDICARE

## 2025-08-03 ENCOUNTER — OFFICE VISIT (OUTPATIENT)
Dept: URGENT CARE | Facility: CLINIC | Age: 75
End: 2025-08-03
Payer: MEDICARE

## 2025-08-03 VITALS
SYSTOLIC BLOOD PRESSURE: 128 MMHG | DIASTOLIC BLOOD PRESSURE: 75 MMHG | HEIGHT: 73 IN | BODY MASS INDEX: 34.85 KG/M2 | WEIGHT: 263 LBS | RESPIRATION RATE: 16 BRPM | HEART RATE: 61 BPM | TEMPERATURE: 98 F | OXYGEN SATURATION: 96 %

## 2025-08-03 DIAGNOSIS — L02.214 ABSCESS OF GROIN, RIGHT: Primary | ICD-10-CM

## 2025-08-03 PROCEDURE — 99214 OFFICE O/P EST MOD 30 MIN: CPT | Mod: S$GLB,,, | Performed by: PHYSICIAN ASSISTANT

## 2025-08-03 RX ORDER — DOXYCYCLINE HYCLATE 100 MG
100 TABLET ORAL 2 TIMES DAILY
Qty: 14 TABLET | Refills: 0 | Status: SHIPPED | OUTPATIENT
Start: 2025-08-03 | End: 2025-08-10

## 2025-08-03 RX ORDER — MUPIROCIN 20 MG/G
OINTMENT TOPICAL 3 TIMES DAILY
Qty: 22 G | Refills: 0 | Status: SHIPPED | OUTPATIENT
Start: 2025-08-03

## 2025-08-03 NOTE — PROGRESS NOTES
"Subjective:      Patient ID: Aaron Ross is a 75 y.o. male.    Vitals:  height is 6' 1" (1.854 m) and weight is 119.3 kg (263 lb). His temperature is 98.3 °F (36.8 °C). His blood pressure is 128/75 and his pulse is 61. His respiration is 16 and oxygen saturation is 96%.     Chief Complaint: Abscess    Patient is a 75-year-old male who presents with abscess to the groin.  Past medical history significant for hypertension, hyperlipidemia and prediabetes.  He reports started about 4 days ago with associated pain and swelling.  Denies drainage.  Denies fevers.                 Constitution: Negative for chills and fever.   Cardiovascular:  Negative for chest pain.   Respiratory:  Negative for cough.    Gastrointestinal:  Negative for abdominal pain and vomiting.   Musculoskeletal:  Negative for pain and trauma.   Skin:  Positive for erythema and abscess.   Neurological:  Negative for headaches.      Objective:     Physical Exam   Constitutional: He is oriented to person, place, and time. He appears well-developed.   HENT:   Head: Normocephalic and atraumatic. Head is without abrasion, without contusion and without laceration.   Ears:   Right Ear: External ear normal.   Left Ear: External ear normal.   Nose: Nose normal.   Mouth/Throat: Oropharynx is clear and moist and mucous membranes are normal.   Eyes: Conjunctivae, EOM and lids are normal. Pupils are equal, round, and reactive to light.   Neck: Trachea normal and phonation normal. Neck supple.   Pulmonary/Chest: Effort normal.   Musculoskeletal: Normal range of motion.         General: Normal range of motion.   Neurological: He is alert and oriented to person, place, and time.   Skin: Skin is warm, dry, intact, no rash and abscessed (2x3 cm abscess to right groin with mild surrounding erythema. associated tenderenss and induration). Capillary refill takes less than 2 seconds. erythema No abrasion, No burn, No bruising and No ecchymosis   Psychiatric: His " speech is normal and behavior is normal. Judgment and thought content normal.   Nursing note and vitals reviewed.      Assessment:     1. Abscess of groin, right        Plan:       Abscess of groin, right  -     doxycycline (VIBRA-TABS) 100 MG tablet; Take 1 tablet (100 mg total) by mouth 2 (two) times daily. for 7 days  Dispense: 14 tablet; Refill: 0  -     mupirocin (BACTROBAN) 2 % ointment; Apply topically 3 (three) times daily.  Dispense: 22 g; Refill: 0  -     Incision & Drainage          Medical Decision Making:   History:   Old Medical Records: I decided to obtain old medical records.  Urgent Care Management:  The patient's abscess has been incised and drained.  The patient will be placed on antibiotics.  The patient has no signs of systemic symptoms or significant cellulitis to warrant admission at this time.  The patient has been instructed to follow up with their regular doctor.  I've given the patient specific return precautions.

## 2025-08-03 NOTE — PROCEDURES
"Incision & Drainage    Date/Time: 8/3/2025 12:50 PM    Performed by: Shaunna Xiong PA-C  Authorized by: Shaunna Xiong PA-C    Time out: Immediately prior to procedure a "time out" was called to verify the correct patient, procedure, equipment, support staff and site/side marked as required.      Type:  Abscess  Body area:  Lower extremity (right groin)  Anesthesia:  Local infiltration  Local anesthetic: Lidocaine 1% without epinephrine  Anesthetic total (ml):  4  Scalpel size:  11  Incision type:  Single straight  Complexity:  Complex  Drainage:  Pus  Drainage amount:  Copious  Wound treatment:  Incision, drainage and expression of material    "

## 2025-08-03 NOTE — PATIENT INSTRUCTIONS
Keep the wound clean and dry.  You can apply topical antibiotics.  Take oral antibiotics as prescribed.  If symptoms worsen you need to be re-evaluated.      If you were prescribed a narcotic or controlled medication, do not drive or operate heavy equipment or machinery while taking these medications.  If you were prescribed antibiotics, please take them to completion.  You must understand that you've received an Urgent Care treatment only and that you may be released before all your medical problems are known or treated. You, the patient, will arrange for follow up care as instructed.  Follow up with your PCP or specialty clinic as directed in the next 1-2 weeks if not improved or as needed.  You can call [phone] to schedule an appointment with the appropriate provider.  If your condition worsens we recommend that you receive another evaluation at the emergency room immediately or contact your primary medical clinics after hours call service to discuss your concerns.  Please return here or go to the Emergency Department for any concerns or worsening of condition.        WE CANNOT RULE OUT ALL POSSIBLE CAUSES OF YOUR SYMPTOMS IN THE URGENT CARE SETTING PLEASE GO TO THE ER IF YOU FEELS YOUR CONDITION IS WORSENING OR YOU WOULD LIKE EMERGENT EVALUATION.

## (undated) DEVICE — PAD CAST SPECIALIST STRL 3

## (undated) DEVICE — SEE MEDLINE ITEM 107746

## (undated) DEVICE — DRAPE THREE-QTR REINF 53X77IN

## (undated) DEVICE — DRAPE CYSTOSCOPY LARGE

## (undated) DEVICE — BLADE SAG 13.0 X1.27X90

## (undated) DEVICE — BAG LINGEMAN DRAIN UROLOGY

## (undated) DEVICE — DRAPE HAND STERILE

## (undated) DEVICE — DRAPE UINDERBUT GRAD PCH

## (undated) DEVICE — PAD CAST SPECIALIST STRL 6

## (undated) DEVICE — PACK LOWER EXTREMITY

## (undated) DEVICE — TUBING SUC UNIV W/CONN 12FT

## (undated) DEVICE — SUT ETHILON 4-0 PS2 18 BLK

## (undated) DEVICE — SOL .9NACL PF 100 ML

## (undated) DEVICE — SCRUB DYNA-HEX LIQ 4% CHG 4OZ

## (undated) DEVICE — LINER SUCTION 3000CC

## (undated) DEVICE — SUT 2-0 VICRYL / CT-1

## (undated) DEVICE — STRAP OR TABLE 5IN X 72IN

## (undated) DEVICE — SOL STERILE WATER INJ 1000ML

## (undated) DEVICE — SEE MEDLINE ITEM 152530

## (undated) DEVICE — BANDAGE ESMARK 6X12

## (undated) DEVICE — SYR 10CC LUER LOCK

## (undated) DEVICE — SOL IRR NACL .9% 3000ML

## (undated) DEVICE — SOL 9P NACL IRR PIC IL

## (undated) DEVICE — CORD BIPOLAR 12 FOOT

## (undated) DEVICE — SPONGE BULKEE II ABSRB 6X6.75

## (undated) DEVICE — BOWL STERILE LARGE 32OZ

## (undated) DEVICE — SOL NACL IRR 3000ML

## (undated) DEVICE — TOURNIQUET SB QC DP 34X4IN

## (undated) DEVICE — DRAPE HALF SURGICAL 40X58IN

## (undated) DEVICE — SEE MEDLINE ITEM 157131

## (undated) DEVICE — BLADE SAW SGTL 21.2X85.5X1.2

## (undated) DEVICE — UNDERGLOVES BIOGEL PI SIZE 8.5

## (undated) DEVICE — SOL NACL IRR 1000ML BTL

## (undated) DEVICE — SOL IRR WATER STRL 3000 ML

## (undated) DEVICE — SET CYSTO IRR DRP CHMBR 84IN

## (undated) DEVICE — SEE MEDLINE ITEM 152622

## (undated) DEVICE — SYR LUER LOCK STERILE 10ML

## (undated) DEVICE — SUT FIBERWIRE 2 38 IN TAPER

## (undated) DEVICE — DRAPE STERI INSTRUMENT 1018

## (undated) DEVICE — SEE L#120831

## (undated) DEVICE — CLOSURE SKIN STERI STRIP 1/2X4

## (undated) DEVICE — SUT MONOCRYL 3-0 PS-1

## (undated) DEVICE — SYR 50CC LL

## (undated) DEVICE — SLEEVE SCD EXPRESS KNEE MEDIUM

## (undated) DEVICE — GLOVE SURG ULTRA TOUCH 7

## (undated) DEVICE — SUT STRATAFIX PDS 1 CTX 18IN

## (undated) DEVICE — SEE MEDLINE ITEM 146409

## (undated) DEVICE — GAUZE SPONGE 4X4 12PLY

## (undated) DEVICE — LEGGING CLEAR POLY 2/PACK

## (undated) DEVICE — PACK CUSTOM UNIV BASIN SLI

## (undated) DEVICE — COOLER ICEMAN COLD THERAPY

## (undated) DEVICE — BANDAGE ESMARK LATEX FREE 4INX

## (undated) DEVICE — BLADE SURG CARBON STEEL #10

## (undated) DEVICE — TAPE SILK 3IN

## (undated) DEVICE — APPLICATOR CHLORAPREP ORN 26ML

## (undated) DEVICE — NDL SPINAL 18GX3.5 SPINOCAN

## (undated) DEVICE — Device

## (undated) DEVICE — SHEET DRAPE MEDIUM

## (undated) DEVICE — SPONGE DERMACEA 4X4IN 12PLY

## (undated) DEVICE — DRAPE INCISE IOBAN 2 23X17IN

## (undated) DEVICE — KIT UROLIFT 2 CARTRIDGE HANDLE

## (undated) DEVICE — DRAIN SINGLE ROUND 3/16 15F

## (undated) DEVICE — SET IRR URLGY 2LINE UNIV SPIKE

## (undated) DEVICE — ALCOHOL 70% ISOP RUBBING 4OZ

## (undated) DEVICE — SPACESUIT TOGA T5 ZIPPER PEEL

## (undated) DEVICE — PACK BASIC

## (undated) DEVICE — SET DECANTER MEDICHOICE

## (undated) DEVICE — SYR 50ML CATH TIP

## (undated) DEVICE — SEE MEDLINE ITEM 157166

## (undated) DEVICE — SEE MEDLINE ITEM 153151

## (undated) DEVICE — SLEEVE SCD EXPRESS CALF MEDIUM

## (undated) DEVICE — GOWN POLY REINF BRTH SLV LG

## (undated) DEVICE — SEE MEDLINE ITEM 146271

## (undated) DEVICE — GLOVE PROTEXIS LTX MICRO  7.5

## (undated) DEVICE — GLOVE SURG ULTRA TOUCH 8.5

## (undated) DEVICE — INTERPULSE SET

## (undated) DEVICE — ELECTRODE REM PLYHSV RETURN 9

## (undated) DEVICE — DRESSING XEROFORM FOIL PK 1X8

## (undated) DEVICE — PENCIL ROCKER SWITCH 10FT CORD

## (undated) DEVICE — DRAPE U SPLIT SHEET 54X76IN

## (undated) DEVICE — GUIDE BIOPSY BIPLANAR 18G

## (undated) DEVICE — DRAPE STERI U-SHAPED 47X51IN

## (undated) DEVICE — FORCEP STRAIGHT DISP

## (undated) DEVICE — DRAPE STERI-DRAPE 1000 17X11IN

## (undated) DEVICE — SCRUB 10% POVIDONE IODINE 4OZ

## (undated) DEVICE — COVER TRANSDUCER LATEX N/STERI

## (undated) DEVICE — SEE MEDLINE ITEM 146231

## (undated) DEVICE — BAG DRAIN ANTI REFLUX 2000ML

## (undated) DEVICE — LUBRICANT SURGILUBE 2 OZ

## (undated) DEVICE — TOURNIQUET SB QC DP 18X4IN

## (undated) DEVICE — PADDING CAST 4IN SPECIALIST

## (undated) DEVICE — GOWN POLY REINF BRTH SLV XL

## (undated) DEVICE — JELLY SURGILUBE LUBE TUBE 2OZ

## (undated) DEVICE — COVER TABLE 44X90 STERILE

## (undated) DEVICE — DEVICE ANC SW STAT FOLEY 6-24

## (undated) DEVICE — GLOVE PROTEXIS LTX MICRO 8

## (undated) DEVICE — KIT AUTO TRANSF 800ML RESVR

## (undated) DEVICE — NDL HYPO 27G X 1 1/2

## (undated) DEVICE — ADHESIVE MASTISOL VIAL 48/BX

## (undated) DEVICE — GAUZE SPONGE BULKEE 6X6.75IN

## (undated) DEVICE — SPONGE GAUZE 16PLY 4X4

## (undated) DEVICE — BANDAGE ELAS SOFTWRAP ST 3X5YD